# Patient Record
Sex: MALE | Race: WHITE | NOT HISPANIC OR LATINO | Employment: OTHER | URBAN - METROPOLITAN AREA
[De-identification: names, ages, dates, MRNs, and addresses within clinical notes are randomized per-mention and may not be internally consistent; named-entity substitution may affect disease eponyms.]

---

## 2017-02-13 ENCOUNTER — OFFICE VISIT - HEALTHEAST (OUTPATIENT)
Dept: ENDOCRINOLOGY | Facility: CLINIC | Age: 72
End: 2017-02-13

## 2017-02-13 ENCOUNTER — COMMUNICATION - HEALTHEAST (OUTPATIENT)
Dept: TELEHEALTH | Facility: CLINIC | Age: 72
End: 2017-02-13

## 2017-02-13 DIAGNOSIS — E29.1 HYPOGONADISM IN MALE: ICD-10-CM

## 2017-02-13 DIAGNOSIS — E66.9 OBESITY: ICD-10-CM

## 2017-02-13 DIAGNOSIS — E29.1 TESTICULAR HYPOFUNCTION: ICD-10-CM

## 2017-02-13 DIAGNOSIS — I10 ESSENTIAL HYPERTENSION WITH GOAL BLOOD PRESSURE LESS THAN 140/90: ICD-10-CM

## 2017-02-13 DIAGNOSIS — F52.8 PSYCHOSEXUAL DYSFUNCTION WITH INHIBITED SEXUAL EXCITEMENT: ICD-10-CM

## 2017-02-13 LAB
ALT SERPL W P-5'-P-CCNC: 64 U/L (ref 0–45)
CHOLEST SERPL-MCNC: 171 MG/DL
CREAT SERPL-MCNC: 1.27 MG/DL (ref 0.7–1.3)
FASTING STATUS PATIENT QL REPORTED: ABNORMAL
GFR SERPL CREATININE-BSD FRML MDRD: 56 ML/MIN/1.73M2
HDLC SERPL-MCNC: 28 MG/DL
LDLC SERPL CALC-MCNC: 70 MG/DL
TRIGL SERPL-MCNC: 363 MG/DL

## 2017-02-15 ENCOUNTER — COMMUNICATION - HEALTHEAST (OUTPATIENT)
Dept: ENDOCRINOLOGY | Facility: CLINIC | Age: 72
End: 2017-02-15

## 2017-02-15 DIAGNOSIS — E78.1 HYPERTRIGLYCERIDEMIA: ICD-10-CM

## 2017-02-16 ENCOUNTER — COMMUNICATION - HEALTHEAST (OUTPATIENT)
Dept: ENDOCRINOLOGY | Facility: CLINIC | Age: 72
End: 2017-02-16

## 2017-02-27 ENCOUNTER — COMMUNICATION - HEALTHEAST (OUTPATIENT)
Dept: ENDOCRINOLOGY | Facility: CLINIC | Age: 72
End: 2017-02-27

## 2017-02-27 DIAGNOSIS — E78.00 HYPERCHOLESTEROLEMIA: ICD-10-CM

## 2017-03-14 ENCOUNTER — OFFICE VISIT - HEALTHEAST (OUTPATIENT)
Dept: FAMILY MEDICINE | Facility: CLINIC | Age: 72
End: 2017-03-14

## 2017-03-14 ENCOUNTER — RECORDS - HEALTHEAST (OUTPATIENT)
Dept: GENERAL RADIOLOGY | Facility: CLINIC | Age: 72
End: 2017-03-14

## 2017-03-14 DIAGNOSIS — Z00.01 ENCOUNTER FOR GENERAL ADULT MEDICAL EXAMINATION WITH ABNORMAL FINDINGS: ICD-10-CM

## 2017-03-14 DIAGNOSIS — R14.0 ABDOMINAL DISTENSION (GASEOUS): ICD-10-CM

## 2017-03-14 DIAGNOSIS — J45.20 MILD INTERMITTENT ASTHMA WITHOUT COMPLICATION: ICD-10-CM

## 2017-03-14 DIAGNOSIS — F52.8 PSYCHOSEXUAL DYSFUNCTION WITH INHIBITED SEXUAL EXCITEMENT: ICD-10-CM

## 2017-03-14 DIAGNOSIS — D75.1 ERYTHROCYTOSIS: ICD-10-CM

## 2017-03-14 DIAGNOSIS — G47.00 INSOMNIA: ICD-10-CM

## 2017-03-14 DIAGNOSIS — E29.1 TESTICULAR HYPOFUNCTION: ICD-10-CM

## 2017-03-14 DIAGNOSIS — Z12.11 COLON CANCER SCREENING: ICD-10-CM

## 2017-03-14 DIAGNOSIS — L21.9 SEBORRHEIC DERMATITIS: ICD-10-CM

## 2017-03-14 DIAGNOSIS — R14.0 ABDOMINAL DISTENSION: ICD-10-CM

## 2017-03-14 DIAGNOSIS — G47.33 OBSTRUCTIVE SLEEP APNEA (ADULT) (PEDIATRIC): ICD-10-CM

## 2017-03-14 DIAGNOSIS — I10 ESSENTIAL HYPERTENSION WITH GOAL BLOOD PRESSURE LESS THAN 140/90: ICD-10-CM

## 2017-03-14 ASSESSMENT — MIFFLIN-ST. JEOR: SCORE: 1651.19

## 2017-04-26 ENCOUNTER — COMMUNICATION - HEALTHEAST (OUTPATIENT)
Dept: FAMILY MEDICINE | Facility: CLINIC | Age: 72
End: 2017-04-26

## 2017-04-26 DIAGNOSIS — F43.10 POSTTRAUMATIC STRESS DISORDER: ICD-10-CM

## 2017-04-29 ENCOUNTER — COMMUNICATION - HEALTHEAST (OUTPATIENT)
Dept: FAMILY MEDICINE | Facility: CLINIC | Age: 72
End: 2017-04-29

## 2017-04-29 DIAGNOSIS — I10 HTN (HYPERTENSION): ICD-10-CM

## 2017-05-12 ENCOUNTER — RECORDS - HEALTHEAST (OUTPATIENT)
Dept: ADMINISTRATIVE | Facility: OTHER | Age: 72
End: 2017-05-12

## 2017-06-26 ENCOUNTER — OFFICE VISIT - HEALTHEAST (OUTPATIENT)
Dept: FAMILY MEDICINE | Facility: CLINIC | Age: 72
End: 2017-06-26

## 2017-06-26 ENCOUNTER — AMBULATORY - HEALTHEAST (OUTPATIENT)
Dept: FAMILY MEDICINE | Facility: CLINIC | Age: 72
End: 2017-06-26

## 2017-06-26 DIAGNOSIS — E78.1 HYPERTRIGLYCERIDEMIA: ICD-10-CM

## 2017-06-26 DIAGNOSIS — G47.30 SLEEP APNEA: ICD-10-CM

## 2017-06-26 DIAGNOSIS — R63.5 WEIGHT GAIN: ICD-10-CM

## 2017-06-26 DIAGNOSIS — F43.10 PTSD (POST-TRAUMATIC STRESS DISORDER): ICD-10-CM

## 2017-06-26 DIAGNOSIS — E78.6 LOW HDL (UNDER 40): ICD-10-CM

## 2017-08-07 ENCOUNTER — COMMUNICATION - HEALTHEAST (OUTPATIENT)
Dept: ENDOCRINOLOGY | Facility: CLINIC | Age: 72
End: 2017-08-07

## 2017-08-07 DIAGNOSIS — E29.1 HYPOGONADISM IN MALE: ICD-10-CM

## 2017-08-17 ENCOUNTER — COMMUNICATION - HEALTHEAST (OUTPATIENT)
Dept: ENDOCRINOLOGY | Facility: CLINIC | Age: 72
End: 2017-08-17

## 2017-08-17 DIAGNOSIS — E29.1 HYPOGONADISM IN MALE: ICD-10-CM

## 2017-10-16 ENCOUNTER — RECORDS - HEALTHEAST (OUTPATIENT)
Dept: ADMINISTRATIVE | Facility: OTHER | Age: 72
End: 2017-10-16

## 2017-10-26 ENCOUNTER — RECORDS - HEALTHEAST (OUTPATIENT)
Dept: ADMINISTRATIVE | Facility: OTHER | Age: 72
End: 2017-10-26

## 2017-10-30 ENCOUNTER — RECORDS - HEALTHEAST (OUTPATIENT)
Dept: ADMINISTRATIVE | Facility: OTHER | Age: 72
End: 2017-10-30

## 2017-11-01 ENCOUNTER — COMMUNICATION - HEALTHEAST (OUTPATIENT)
Dept: ONCOLOGY | Facility: HOSPITAL | Age: 72
End: 2017-11-01

## 2017-11-02 ENCOUNTER — RECORDS - HEALTHEAST (OUTPATIENT)
Dept: ADMINISTRATIVE | Facility: OTHER | Age: 72
End: 2017-11-02

## 2017-11-15 ENCOUNTER — COMMUNICATION - HEALTHEAST (OUTPATIENT)
Dept: ONCOLOGY | Facility: HOSPITAL | Age: 72
End: 2017-11-15

## 2017-11-17 ENCOUNTER — RECORDS - HEALTHEAST (OUTPATIENT)
Dept: ADMINISTRATIVE | Facility: OTHER | Age: 72
End: 2017-11-17

## 2017-12-14 ENCOUNTER — RECORDS - HEALTHEAST (OUTPATIENT)
Dept: ADMINISTRATIVE | Facility: OTHER | Age: 72
End: 2017-12-14

## 2018-01-30 ENCOUNTER — COMMUNICATION - HEALTHEAST (OUTPATIENT)
Dept: ENDOCRINOLOGY | Facility: CLINIC | Age: 73
End: 2018-01-30

## 2018-01-30 DIAGNOSIS — F52.8 PSYCHOSEXUAL DYSFUNCTION WITH INHIBITED SEXUAL EXCITEMENT: ICD-10-CM

## 2018-01-30 DIAGNOSIS — N40.0 BPH (BENIGN PROSTATIC HYPERPLASIA): ICD-10-CM

## 2018-02-07 ENCOUNTER — AMBULATORY - HEALTHEAST (OUTPATIENT)
Dept: LAB | Facility: CLINIC | Age: 73
End: 2018-02-07

## 2018-02-07 DIAGNOSIS — F52.8 PSYCHOSEXUAL DYSFUNCTION WITH INHIBITED SEXUAL EXCITEMENT: ICD-10-CM

## 2018-02-07 DIAGNOSIS — N40.0 BPH (BENIGN PROSTATIC HYPERPLASIA): ICD-10-CM

## 2018-02-07 LAB
CREAT SERPL-MCNC: 1.34 MG/DL (ref 0.7–1.3)
GFR SERPL CREATININE-BSD FRML MDRD: 53 ML/MIN/1.73M2
POTASSIUM BLD-SCNC: 4.2 MMOL/L (ref 3.5–5)

## 2018-02-09 ENCOUNTER — OFFICE VISIT - HEALTHEAST (OUTPATIENT)
Dept: FAMILY MEDICINE | Facility: CLINIC | Age: 73
End: 2018-02-09

## 2018-02-09 DIAGNOSIS — R63.5 WEIGHT GAIN: ICD-10-CM

## 2018-02-09 DIAGNOSIS — G47.33 OBSTRUCTIVE SLEEP APNEA: ICD-10-CM

## 2018-02-09 DIAGNOSIS — E29.1 HYPOGONADISM IN MALE: ICD-10-CM

## 2018-02-09 LAB
PSA FREE MFR SERPL: 33 %
PSA FREE SERPL-MCNC: 0.2 NG/ML
PSA SERPL IA-MCNC: 0.6 NG/ML (ref 0–4)
TESTOST SERPL-MCNC: 417 NG/DL (ref 221–716)

## 2018-02-12 ENCOUNTER — OFFICE VISIT - HEALTHEAST (OUTPATIENT)
Dept: ENDOCRINOLOGY | Facility: CLINIC | Age: 73
End: 2018-02-12

## 2018-02-12 DIAGNOSIS — E29.0 TESTICULAR HYPERFUNCTION: ICD-10-CM

## 2018-02-12 ASSESSMENT — MIFFLIN-ST. JEOR: SCORE: 1682.94

## 2018-03-30 ENCOUNTER — COMMUNICATION - HEALTHEAST (OUTPATIENT)
Dept: ENDOCRINOLOGY | Facility: CLINIC | Age: 73
End: 2018-03-30

## 2018-03-30 DIAGNOSIS — E29.1 HYPOGONADISM IN MALE: ICD-10-CM

## 2018-04-27 ENCOUNTER — OFFICE VISIT - HEALTHEAST (OUTPATIENT)
Dept: FAMILY MEDICINE | Facility: CLINIC | Age: 73
End: 2018-04-27

## 2018-04-27 ENCOUNTER — AMBULATORY - HEALTHEAST (OUTPATIENT)
Dept: FAMILY MEDICINE | Facility: CLINIC | Age: 73
End: 2018-04-27

## 2018-04-27 DIAGNOSIS — G47.33 OBSTRUCTIVE SLEEP APNEA: ICD-10-CM

## 2018-04-27 DIAGNOSIS — D75.1 ERYTHROCYTOSIS: ICD-10-CM

## 2018-04-27 DIAGNOSIS — R63.5 WEIGHT GAIN: ICD-10-CM

## 2018-04-27 DIAGNOSIS — I10 HTN (HYPERTENSION): ICD-10-CM

## 2018-04-27 DIAGNOSIS — E78.00 HYPERCHOLESTEROLEMIA: ICD-10-CM

## 2018-04-27 DIAGNOSIS — R53.83 FATIGUE: ICD-10-CM

## 2018-04-27 DIAGNOSIS — Z12.11 SCREEN FOR COLON CANCER: ICD-10-CM

## 2018-04-27 DIAGNOSIS — R14.0 BLOATING: ICD-10-CM

## 2018-04-27 LAB
ALBUMIN SERPL-MCNC: 3.7 G/DL (ref 3.5–5)
ALP SERPL-CCNC: 117 U/L (ref 45–120)
ALT SERPL W P-5'-P-CCNC: 62 U/L (ref 0–45)
ANION GAP SERPL CALCULATED.3IONS-SCNC: 14 MMOL/L (ref 5–18)
AST SERPL W P-5'-P-CCNC: 27 U/L (ref 0–40)
BASOPHILS # BLD AUTO: 0.1 THOU/UL (ref 0–0.2)
BASOPHILS NFR BLD AUTO: 2 % (ref 0–2)
BILIRUB SERPL-MCNC: 1.2 MG/DL (ref 0–1)
BUN SERPL-MCNC: 23 MG/DL (ref 8–28)
CALCIUM SERPL-MCNC: 9.5 MG/DL (ref 8.5–10.5)
CHLORIDE BLD-SCNC: 106 MMOL/L (ref 98–107)
CO2 SERPL-SCNC: 20 MMOL/L (ref 22–31)
CREAT SERPL-MCNC: 1.06 MG/DL (ref 0.7–1.3)
EOSINOPHIL # BLD AUTO: 0.1 THOU/UL (ref 0–0.4)
EOSINOPHIL NFR BLD AUTO: 3 % (ref 0–6)
ERYTHROCYTE [DISTWIDTH] IN BLOOD BY AUTOMATED COUNT: 11.7 % (ref 11–14.5)
GFR SERPL CREATININE-BSD FRML MDRD: >60 ML/MIN/1.73M2
GLUCOSE BLD-MCNC: 109 MG/DL (ref 70–125)
HBA1C MFR BLD: 8.6 % (ref 3.5–6)
HCT VFR BLD AUTO: 58.4 % (ref 40–54)
HGB BLD-MCNC: 19.8 G/DL (ref 14–18)
LYMPHOCYTES # BLD AUTO: 1.7 THOU/UL (ref 0.8–4.4)
LYMPHOCYTES NFR BLD AUTO: 34 % (ref 20–40)
MCH RBC QN AUTO: 34.1 PG (ref 27–34)
MCHC RBC AUTO-ENTMCNC: 33.9 G/DL (ref 32–36)
MCV RBC AUTO: 101 FL (ref 80–100)
MONOCYTES # BLD AUTO: 0.8 THOU/UL (ref 0–0.9)
MONOCYTES NFR BLD AUTO: 15 % (ref 2–10)
NEUTROPHILS # BLD AUTO: 2.4 THOU/UL (ref 2–7.7)
NEUTROPHILS NFR BLD AUTO: 47 % (ref 50–70)
PLATELET # BLD AUTO: 159 THOU/UL (ref 140–440)
PMV BLD AUTO: 8.5 FL (ref 7–10)
POTASSIUM BLD-SCNC: 4.5 MMOL/L (ref 3.5–5)
PROT SERPL-MCNC: 7.2 G/DL (ref 6–8)
RBC # BLD AUTO: 5.8 MILL/UL (ref 4.4–6.2)
SODIUM SERPL-SCNC: 140 MMOL/L (ref 136–145)
TSH SERPL DL<=0.005 MIU/L-ACNC: 1.62 UIU/ML (ref 0.3–5)
VIT B12 SERPL-MCNC: 1441 PG/ML (ref 213–816)
WBC: 5.1 THOU/UL (ref 4–11)

## 2018-04-30 LAB
25(OH)D3 SERPL-MCNC: 20.3 NG/ML (ref 30–80)
CHOLEST SERPL-MCNC: 195 MG/DL
GLIADIN IGA SER-ACNC: 2.2 U/ML
GLIADIN IGG SER-ACNC: 1.2 U/ML
HDLC SERPL-MCNC: 25 MG/DL
IGA SERPL-MCNC: 232 MG/DL (ref 65–400)
LDLC SERPL CALC-MCNC: 100 MG/DL
LDLC SERPL CALC-MCNC: ABNORMAL MG/DL
TRIGL SERPL-MCNC: 593 MG/DL
TTG IGA SER-ACNC: 0.5 U/ML
TTG IGG SER-ACNC: <0.6 U/ML

## 2018-05-01 ENCOUNTER — OFFICE VISIT - HEALTHEAST (OUTPATIENT)
Dept: PODIATRY | Facility: CLINIC | Age: 73
End: 2018-05-01

## 2018-05-01 ENCOUNTER — RECORDS - HEALTHEAST (OUTPATIENT)
Dept: ADMINISTRATIVE | Facility: OTHER | Age: 73
End: 2018-05-01

## 2018-05-01 DIAGNOSIS — M79.672 LEFT FOOT PAIN: ICD-10-CM

## 2018-05-06 ENCOUNTER — COMMUNICATION - HEALTHEAST (OUTPATIENT)
Dept: FAMILY MEDICINE | Facility: CLINIC | Age: 73
End: 2018-05-06

## 2018-05-07 ENCOUNTER — COMMUNICATION - HEALTHEAST (OUTPATIENT)
Dept: ADMINISTRATIVE | Facility: CLINIC | Age: 73
End: 2018-05-07

## 2018-05-15 ENCOUNTER — COMMUNICATION - HEALTHEAST (OUTPATIENT)
Dept: FAMILY MEDICINE | Facility: CLINIC | Age: 73
End: 2018-05-15

## 2018-05-15 DIAGNOSIS — I10 HTN (HYPERTENSION): ICD-10-CM

## 2018-05-21 ENCOUNTER — RECORDS - HEALTHEAST (OUTPATIENT)
Dept: ADMINISTRATIVE | Facility: OTHER | Age: 73
End: 2018-05-21

## 2018-05-22 ENCOUNTER — OFFICE VISIT - HEALTHEAST (OUTPATIENT)
Dept: FAMILY MEDICINE | Facility: CLINIC | Age: 73
End: 2018-05-22

## 2018-05-22 DIAGNOSIS — Z01.818 PREOPERATIVE EXAMINATION: ICD-10-CM

## 2018-05-22 DIAGNOSIS — I10 HTN (HYPERTENSION): ICD-10-CM

## 2018-05-22 DIAGNOSIS — E29.1 HYPOGONADISM IN MALE: ICD-10-CM

## 2018-05-22 DIAGNOSIS — E11.9 TYPE 2 DIABETES MELLITUS (H): ICD-10-CM

## 2018-05-22 DIAGNOSIS — M20.5X9 HALLUX LIMITUS: ICD-10-CM

## 2018-05-22 DIAGNOSIS — J45.20 MILD INTERMITTENT ASTHMA WITHOUT COMPLICATION: ICD-10-CM

## 2018-05-22 DIAGNOSIS — D75.1 POLYCYTHEMIA, SECONDARY: ICD-10-CM

## 2018-05-22 DIAGNOSIS — G47.33 OBSTRUCTIVE SLEEP APNEA: ICD-10-CM

## 2018-05-22 LAB
ANION GAP SERPL CALCULATED.3IONS-SCNC: 12 MMOL/L (ref 5–18)
ATRIAL RATE - MUSE: 90 BPM
BUN SERPL-MCNC: 20 MG/DL (ref 8–28)
CALCIUM SERPL-MCNC: 9.4 MG/DL (ref 8.5–10.5)
CHLORIDE BLD-SCNC: 103 MMOL/L (ref 98–107)
CO2 SERPL-SCNC: 25 MMOL/L (ref 22–31)
CREAT SERPL-MCNC: 1.22 MG/DL (ref 0.7–1.3)
DIASTOLIC BLOOD PRESSURE - MUSE: NORMAL MMHG
GFR SERPL CREATININE-BSD FRML MDRD: 59 ML/MIN/1.73M2
GLUCOSE BLD-MCNC: 343 MG/DL (ref 70–125)
HGB BLD-MCNC: 18.7 G/DL (ref 14–18)
INTERPRETATION ECG - MUSE: NORMAL
P AXIS - MUSE: 57 DEGREES
POTASSIUM BLD-SCNC: 4.1 MMOL/L (ref 3.5–5)
PR INTERVAL - MUSE: 162 MS
QRS DURATION - MUSE: 102 MS
QT - MUSE: 366 MS
QTC - MUSE: 447 MS
R AXIS - MUSE: -20 DEGREES
SODIUM SERPL-SCNC: 140 MMOL/L (ref 136–145)
SYSTOLIC BLOOD PRESSURE - MUSE: NORMAL MMHG
T AXIS - MUSE: 34 DEGREES
VENTRICULAR RATE- MUSE: 90 BPM

## 2018-05-22 ASSESSMENT — MIFFLIN-ST. JEOR: SCORE: 1673.87

## 2018-05-24 ENCOUNTER — COMMUNICATION - HEALTHEAST (OUTPATIENT)
Dept: FAMILY MEDICINE | Facility: CLINIC | Age: 73
End: 2018-05-24

## 2018-05-31 ENCOUNTER — COMMUNICATION - HEALTHEAST (OUTPATIENT)
Dept: ADMINISTRATIVE | Facility: CLINIC | Age: 73
End: 2018-05-31

## 2018-06-19 ENCOUNTER — AMBULATORY - HEALTHEAST (OUTPATIENT)
Dept: EDUCATION SERVICES | Facility: CLINIC | Age: 73
End: 2018-06-19

## 2018-06-19 DIAGNOSIS — E11.65 UNCONTROLLED TYPE 2 DIABETES MELLITUS WITH HYPERGLYCEMIA, WITHOUT LONG-TERM CURRENT USE OF INSULIN (H): ICD-10-CM

## 2018-06-27 ENCOUNTER — COMMUNICATION - HEALTHEAST (OUTPATIENT)
Dept: LAB | Facility: CLINIC | Age: 73
End: 2018-06-27

## 2018-06-28 ENCOUNTER — AMBULATORY - HEALTHEAST (OUTPATIENT)
Dept: ENDOCRINOLOGY | Facility: CLINIC | Age: 73
End: 2018-06-28

## 2018-06-28 DIAGNOSIS — N40.0 BPH (BENIGN PROSTATIC HYPERPLASIA): ICD-10-CM

## 2018-06-28 DIAGNOSIS — E29.1 HYPOGONADISM, MALE: ICD-10-CM

## 2018-06-28 DIAGNOSIS — F52.8 PSYCHOSEXUAL DYSFUNCTION WITH INHIBITED SEXUAL EXCITEMENT: ICD-10-CM

## 2018-06-28 DIAGNOSIS — E29.0 TESTICULAR HYPERFUNCTION: ICD-10-CM

## 2018-07-13 ENCOUNTER — AMBULATORY - HEALTHEAST (OUTPATIENT)
Dept: LAB | Facility: CLINIC | Age: 73
End: 2018-07-13

## 2018-07-13 DIAGNOSIS — E29.1 HYPOGONADISM, MALE: ICD-10-CM

## 2018-07-13 DIAGNOSIS — F52.8 PSYCHOSEXUAL DYSFUNCTION WITH INHIBITED SEXUAL EXCITEMENT: ICD-10-CM

## 2018-07-13 DIAGNOSIS — N40.0 BPH (BENIGN PROSTATIC HYPERPLASIA): ICD-10-CM

## 2018-07-13 DIAGNOSIS — E29.0 TESTICULAR HYPERFUNCTION: ICD-10-CM

## 2018-07-13 LAB
CREAT SERPL-MCNC: 1.25 MG/DL (ref 0.7–1.3)
GFR SERPL CREATININE-BSD FRML MDRD: 57 ML/MIN/1.73M2
POTASSIUM BLD-SCNC: 4.2 MMOL/L (ref 3.5–5)

## 2018-07-15 LAB
PSA FREE MFR SERPL: 17 %
PSA FREE SERPL-MCNC: 0.1 NG/ML
PSA SERPL IA-MCNC: 0.6 NG/ML (ref 0–4)

## 2018-07-16 ENCOUNTER — OFFICE VISIT - HEALTHEAST (OUTPATIENT)
Dept: ENDOCRINOLOGY | Facility: CLINIC | Age: 73
End: 2018-07-16

## 2018-07-16 DIAGNOSIS — E29.1 TESTICULAR HYPOFUNCTION: ICD-10-CM

## 2018-07-16 LAB — TESTOST SERPL-MCNC: 140 NG/DL (ref 221–716)

## 2018-07-16 ASSESSMENT — MIFFLIN-ST. JEOR: SCORE: 1628.51

## 2018-07-20 ENCOUNTER — COMMUNICATION - HEALTHEAST (OUTPATIENT)
Dept: ENDOCRINOLOGY | Facility: CLINIC | Age: 73
End: 2018-07-20

## 2018-07-31 ENCOUNTER — COMMUNICATION - HEALTHEAST (OUTPATIENT)
Dept: ENDOCRINOLOGY | Facility: CLINIC | Age: 73
End: 2018-07-31

## 2018-07-31 DIAGNOSIS — E78.00 HYPERCHOLESTEROLEMIA: ICD-10-CM

## 2018-08-03 ENCOUNTER — RECORDS - HEALTHEAST (OUTPATIENT)
Dept: ADMINISTRATIVE | Facility: OTHER | Age: 73
End: 2018-08-03

## 2018-08-07 ENCOUNTER — COMMUNICATION - HEALTHEAST (OUTPATIENT)
Dept: ENDOCRINOLOGY | Facility: CLINIC | Age: 73
End: 2018-08-07

## 2018-08-07 DIAGNOSIS — E29.1 TESTICULAR HYPOFUNCTION: ICD-10-CM

## 2018-08-07 DIAGNOSIS — E78.00 HYPERCHOLESTEROLEMIA: ICD-10-CM

## 2018-10-04 ENCOUNTER — COMMUNICATION - HEALTHEAST (OUTPATIENT)
Dept: ENDOCRINOLOGY | Facility: CLINIC | Age: 73
End: 2018-10-04

## 2018-10-04 DIAGNOSIS — E78.00 HYPERCHOLESTEROLEMIA: ICD-10-CM

## 2018-10-11 ENCOUNTER — COMMUNICATION - HEALTHEAST (OUTPATIENT)
Dept: PODIATRY | Facility: CLINIC | Age: 73
End: 2018-10-11

## 2018-10-11 ENCOUNTER — COMMUNICATION - HEALTHEAST (OUTPATIENT)
Dept: VASCULAR SURGERY | Facility: CLINIC | Age: 73
End: 2018-10-11

## 2018-10-11 ENCOUNTER — COMMUNICATION - HEALTHEAST (OUTPATIENT)
Dept: ADMINISTRATIVE | Facility: CLINIC | Age: 73
End: 2018-10-11

## 2018-10-13 ENCOUNTER — COMMUNICATION - HEALTHEAST (OUTPATIENT)
Dept: ENDOCRINOLOGY | Facility: CLINIC | Age: 73
End: 2018-10-13

## 2018-10-13 DIAGNOSIS — E29.1 TESTICULAR HYPOFUNCTION: ICD-10-CM

## 2018-12-27 ENCOUNTER — AMBULATORY - HEALTHEAST (OUTPATIENT)
Dept: ENDOCRINOLOGY | Facility: CLINIC | Age: 73
End: 2018-12-27

## 2018-12-27 ENCOUNTER — COMMUNICATION - HEALTHEAST (OUTPATIENT)
Dept: ENDOCRINOLOGY | Facility: CLINIC | Age: 73
End: 2018-12-27

## 2018-12-27 DIAGNOSIS — E29.1 HYPOGONADISM, MALE: ICD-10-CM

## 2018-12-27 DIAGNOSIS — N40.0 BPH (BENIGN PROSTATIC HYPERPLASIA): ICD-10-CM

## 2018-12-27 DIAGNOSIS — R63.5 ABNORMAL WEIGHT GAIN: ICD-10-CM

## 2018-12-27 DIAGNOSIS — R63.5 WEIGHT GAIN: ICD-10-CM

## 2018-12-27 DIAGNOSIS — E66.9 OBESITY: ICD-10-CM

## 2018-12-27 DIAGNOSIS — E29.1 TESTICULAR HYPOFUNCTION: ICD-10-CM

## 2018-12-27 DIAGNOSIS — M54.2 CERVICALGIA: ICD-10-CM

## 2018-12-27 DIAGNOSIS — E11.9 DIABETES MELLITUS, TYPE 2 (H): ICD-10-CM

## 2019-01-03 ENCOUNTER — COMMUNICATION - HEALTHEAST (OUTPATIENT)
Dept: ENDOCRINOLOGY | Facility: CLINIC | Age: 74
End: 2019-01-03

## 2019-01-03 DIAGNOSIS — E29.1 TESTICULAR HYPOFUNCTION: ICD-10-CM

## 2019-01-04 ENCOUNTER — COMMUNICATION - HEALTHEAST (OUTPATIENT)
Dept: ENDOCRINOLOGY | Facility: CLINIC | Age: 74
End: 2019-01-04

## 2019-01-04 DIAGNOSIS — E29.1 TESTICULAR HYPOFUNCTION: ICD-10-CM

## 2019-01-10 ENCOUNTER — AMBULATORY - HEALTHEAST (OUTPATIENT)
Dept: LAB | Facility: CLINIC | Age: 74
End: 2019-01-10

## 2019-01-10 DIAGNOSIS — N40.0 BPH (BENIGN PROSTATIC HYPERPLASIA): ICD-10-CM

## 2019-01-10 DIAGNOSIS — E11.9 DIABETES MELLITUS, TYPE 2 (H): ICD-10-CM

## 2019-01-10 DIAGNOSIS — E29.1 HYPOGONADISM, MALE: ICD-10-CM

## 2019-01-10 DIAGNOSIS — E29.1 TESTICULAR HYPOFUNCTION: ICD-10-CM

## 2019-01-10 LAB
CREAT SERPL-MCNC: 1.63 MG/DL (ref 0.7–1.3)
GFR SERPL CREATININE-BSD FRML MDRD: 42 ML/MIN/1.73M2
HBA1C MFR BLD: 6.1 % (ref 3.5–6)
POTASSIUM BLD-SCNC: 5 MMOL/L (ref 3.5–5)

## 2019-01-11 LAB — TESTOST SERPL-MCNC: 149 NG/DL (ref 221–716)

## 2019-01-12 LAB
PSA FREE MFR SERPL: 17 %
PSA FREE SERPL-MCNC: 0.1 NG/ML
PSA SERPL IA-MCNC: 0.6 NG/ML (ref 0–4)

## 2019-01-18 ENCOUNTER — COMMUNICATION - HEALTHEAST (OUTPATIENT)
Dept: ADMINISTRATIVE | Facility: CLINIC | Age: 74
End: 2019-01-18

## 2019-02-04 ENCOUNTER — COMMUNICATION - HEALTHEAST (OUTPATIENT)
Dept: ENDOCRINOLOGY | Facility: CLINIC | Age: 74
End: 2019-02-04

## 2019-02-04 DIAGNOSIS — E78.00 HYPERCHOLESTEROLEMIA: ICD-10-CM

## 2019-02-06 ENCOUNTER — COMMUNICATION - HEALTHEAST (OUTPATIENT)
Dept: FAMILY MEDICINE | Facility: CLINIC | Age: 74
End: 2019-02-06

## 2019-02-06 DIAGNOSIS — I10 HTN (HYPERTENSION): ICD-10-CM

## 2019-02-10 ENCOUNTER — COMMUNICATION - HEALTHEAST (OUTPATIENT)
Dept: ENDOCRINOLOGY | Facility: CLINIC | Age: 74
End: 2019-02-10

## 2019-02-10 DIAGNOSIS — E29.1 TESTICULAR HYPOFUNCTION: ICD-10-CM

## 2019-04-10 ENCOUNTER — COMMUNICATION - HEALTHEAST (OUTPATIENT)
Dept: ENDOCRINOLOGY | Facility: CLINIC | Age: 74
End: 2019-04-10

## 2019-04-10 DIAGNOSIS — E29.1 TESTICULAR HYPOFUNCTION: ICD-10-CM

## 2019-05-01 ENCOUNTER — COMMUNICATION - HEALTHEAST (OUTPATIENT)
Dept: ENDOCRINOLOGY | Facility: CLINIC | Age: 74
End: 2019-05-01

## 2019-05-01 DIAGNOSIS — E78.00 HYPERCHOLESTEROLEMIA: ICD-10-CM

## 2019-06-10 ENCOUNTER — AMBULATORY - HEALTHEAST (OUTPATIENT)
Dept: FAMILY MEDICINE | Facility: CLINIC | Age: 74
End: 2019-06-10

## 2019-06-11 ENCOUNTER — OFFICE VISIT - HEALTHEAST (OUTPATIENT)
Dept: FAMILY MEDICINE | Facility: CLINIC | Age: 74
End: 2019-06-11

## 2019-06-11 DIAGNOSIS — M25.512 ACUTE PAIN OF LEFT SHOULDER: ICD-10-CM

## 2019-06-11 DIAGNOSIS — N52.9 ERECTILE DYSFUNCTION, UNSPECIFIED ERECTILE DYSFUNCTION TYPE: ICD-10-CM

## 2019-07-11 ENCOUNTER — RECORDS - HEALTHEAST (OUTPATIENT)
Dept: ADMINISTRATIVE | Facility: OTHER | Age: 74
End: 2019-07-11

## 2019-09-04 ENCOUNTER — COMMUNICATION - HEALTHEAST (OUTPATIENT)
Dept: FAMILY MEDICINE | Facility: CLINIC | Age: 74
End: 2019-09-04

## 2019-09-04 DIAGNOSIS — N52.9 ERECTILE DYSFUNCTION, UNSPECIFIED ERECTILE DYSFUNCTION TYPE: ICD-10-CM

## 2019-10-23 ENCOUNTER — AMBULATORY - HEALTHEAST (OUTPATIENT)
Dept: LAB | Facility: CLINIC | Age: 74
End: 2019-10-23

## 2019-10-23 ENCOUNTER — COMMUNICATION - HEALTHEAST (OUTPATIENT)
Dept: LAB | Facility: CLINIC | Age: 74
End: 2019-10-23

## 2019-10-23 DIAGNOSIS — R79.89 LOW TESTOSTERONE: ICD-10-CM

## 2019-10-23 DIAGNOSIS — E11.9 DIABETES MELLITUS (H): ICD-10-CM

## 2019-10-23 DIAGNOSIS — D75.1 POLYCYTHEMIA: ICD-10-CM

## 2019-10-23 LAB
ALT SERPL W P-5'-P-CCNC: 30 U/L (ref 0–45)
ANION GAP SERPL CALCULATED.3IONS-SCNC: 11 MMOL/L (ref 5–18)
BUN SERPL-MCNC: 24 MG/DL (ref 8–28)
CALCIUM SERPL-MCNC: 9.6 MG/DL (ref 8.5–10.5)
CHLORIDE BLD-SCNC: 105 MMOL/L (ref 98–107)
CHOLEST SERPL-MCNC: 170 MG/DL
CO2 SERPL-SCNC: 25 MMOL/L (ref 22–31)
CREAT SERPL-MCNC: 1.23 MG/DL (ref 0.7–1.3)
CREAT UR-MCNC: 99.3 MG/DL
ERYTHROCYTE [DISTWIDTH] IN BLOOD BY AUTOMATED COUNT: 13.8 % (ref 11–14.5)
FASTING STATUS PATIENT QL REPORTED: YES
GFR SERPL CREATININE-BSD FRML MDRD: 58 ML/MIN/1.73M2
GLUCOSE BLD-MCNC: 118 MG/DL (ref 70–125)
HBA1C MFR BLD: 6.7 % (ref 3.5–6)
HCT VFR BLD AUTO: 62 % (ref 40–54)
HDLC SERPL-MCNC: 30 MG/DL
HGB BLD-MCNC: 20.7 G/DL (ref 14–18)
LDLC SERPL CALC-MCNC: 85 MG/DL
MCH RBC QN AUTO: 32 PG (ref 27–34)
MCHC RBC AUTO-ENTMCNC: 33.4 G/DL (ref 32–36)
MCV RBC AUTO: 96 FL (ref 80–100)
MICROALBUMIN UR-MCNC: 3.55 MG/DL (ref 0–1.99)
MICROALBUMIN/CREAT UR: 35.8 MG/G
PLATELET # BLD AUTO: 165 THOU/UL (ref 140–440)
PMV BLD AUTO: 11.9 FL (ref 8.5–12.5)
POTASSIUM BLD-SCNC: 4.9 MMOL/L (ref 3.5–5)
RBC # BLD AUTO: 6.47 MILL/UL (ref 4.4–6.2)
SODIUM SERPL-SCNC: 141 MMOL/L (ref 136–145)
TRIGL SERPL-MCNC: 275 MG/DL
WBC: 4 THOU/UL (ref 4–11)

## 2019-10-28 ENCOUNTER — COMMUNICATION - HEALTHEAST (OUTPATIENT)
Dept: ENDOCRINOLOGY | Facility: CLINIC | Age: 74
End: 2019-10-28

## 2019-10-28 ENCOUNTER — RECORDS - HEALTHEAST (OUTPATIENT)
Dept: ADMINISTRATIVE | Facility: OTHER | Age: 74
End: 2019-10-28

## 2019-10-28 DIAGNOSIS — E78.00 HYPERCHOLESTEROLEMIA: ICD-10-CM

## 2019-10-28 LAB
SHBG SERPL-SCNC: 25 NMOL/L (ref 11–80)
TESTOST FREE SERPL-MCNC: 4.31 NG/DL (ref 4.7–24.4)
TESTOST SERPL-MCNC: 195 NG/DL (ref 240–950)

## 2019-11-04 ENCOUNTER — RECORDS - HEALTHEAST (OUTPATIENT)
Dept: ADMINISTRATIVE | Facility: OTHER | Age: 74
End: 2019-11-04

## 2019-11-25 ENCOUNTER — COMMUNICATION - HEALTHEAST (OUTPATIENT)
Dept: ENDOCRINOLOGY | Facility: CLINIC | Age: 74
End: 2019-11-25

## 2019-11-25 DIAGNOSIS — E78.00 HYPERCHOLESTEROLEMIA: ICD-10-CM

## 2019-12-10 ENCOUNTER — AMBULATORY - HEALTHEAST (OUTPATIENT)
Dept: LAB | Facility: CLINIC | Age: 74
End: 2019-12-10

## 2019-12-11 ENCOUNTER — COMMUNICATION - HEALTHEAST (OUTPATIENT)
Dept: FAMILY MEDICINE | Facility: CLINIC | Age: 74
End: 2019-12-11

## 2019-12-11 ENCOUNTER — AMBULATORY - HEALTHEAST (OUTPATIENT)
Dept: LAB | Facility: CLINIC | Age: 74
End: 2019-12-11

## 2019-12-11 DIAGNOSIS — N52.9 ERECTILE DYSFUNCTION, UNSPECIFIED ERECTILE DYSFUNCTION TYPE: ICD-10-CM

## 2019-12-11 DIAGNOSIS — E29.1 HYPOGONADISM, MALE: ICD-10-CM

## 2020-07-05 ENCOUNTER — COMMUNICATION - HEALTHEAST (OUTPATIENT)
Dept: LAB | Facility: CLINIC | Age: 75
End: 2020-07-05

## 2020-07-05 DIAGNOSIS — N52.9 ERECTILE DYSFUNCTION, UNSPECIFIED ERECTILE DYSFUNCTION TYPE: ICD-10-CM

## 2020-08-06 ENCOUNTER — OFFICE VISIT - HEALTHEAST (OUTPATIENT)
Dept: FAMILY MEDICINE | Facility: CLINIC | Age: 75
End: 2020-08-06

## 2020-08-06 DIAGNOSIS — Z12.11 COLON CANCER SCREENING: ICD-10-CM

## 2020-08-06 DIAGNOSIS — R53.83 FATIGUE, UNSPECIFIED TYPE: ICD-10-CM

## 2020-08-06 DIAGNOSIS — G47.33 OBSTRUCTIVE SLEEP APNEA: ICD-10-CM

## 2020-08-06 DIAGNOSIS — I10 ESSENTIAL HYPERTENSION: ICD-10-CM

## 2020-08-06 DIAGNOSIS — E11.9 TYPE 2 DIABETES MELLITUS WITHOUT COMPLICATION, WITHOUT LONG-TERM CURRENT USE OF INSULIN (H): ICD-10-CM

## 2020-08-06 DIAGNOSIS — D75.1 POLYCYTHEMIA, SECONDARY: ICD-10-CM

## 2020-08-06 LAB
ALBUMIN SERPL-MCNC: 3.9 G/DL (ref 3.5–5)
ALP SERPL-CCNC: 91 U/L (ref 45–120)
ALT SERPL W P-5'-P-CCNC: 37 U/L (ref 0–45)
ANION GAP SERPL CALCULATED.3IONS-SCNC: 16 MMOL/L (ref 5–18)
AST SERPL W P-5'-P-CCNC: 25 U/L (ref 0–40)
BILIRUB SERPL-MCNC: 0.7 MG/DL (ref 0–1)
BUN SERPL-MCNC: 23 MG/DL (ref 8–28)
CALCIUM SERPL-MCNC: 9.1 MG/DL (ref 8.5–10.5)
CHLORIDE BLD-SCNC: 106 MMOL/L (ref 98–107)
CO2 SERPL-SCNC: 21 MMOL/L (ref 22–31)
CREAT SERPL-MCNC: 1.4 MG/DL (ref 0.7–1.3)
ERYTHROCYTE [DISTWIDTH] IN BLOOD BY AUTOMATED COUNT: 12.3 % (ref 11–14.5)
ERYTHROCYTE [SEDIMENTATION RATE] IN BLOOD BY WESTERGREN METHOD: 1 MM/HR (ref 0–15)
GFR SERPL CREATININE-BSD FRML MDRD: 50 ML/MIN/1.73M2
GLUCOSE BLD-MCNC: 225 MG/DL (ref 70–125)
HCT VFR BLD AUTO: 61.2 % (ref 40–54)
HGB BLD-MCNC: 20.2 G/DL (ref 14–18)
MCH RBC QN AUTO: 32.9 PG (ref 27–34)
MCHC RBC AUTO-ENTMCNC: 33.1 G/DL (ref 32–36)
MCV RBC AUTO: 99 FL (ref 80–100)
PLATELET # BLD AUTO: 145 THOU/UL (ref 140–440)
PMV BLD AUTO: 8.5 FL (ref 7–10)
POTASSIUM BLD-SCNC: 4.2 MMOL/L (ref 3.5–5)
PROT SERPL-MCNC: 6.8 G/DL (ref 6–8)
RBC # BLD AUTO: 6.15 MILL/UL (ref 4.4–6.2)
RHEUMATOID FACT SERPL-ACNC: <15 IU/ML (ref 0–30)
SODIUM SERPL-SCNC: 143 MMOL/L (ref 136–145)
TSH SERPL DL<=0.005 MIU/L-ACNC: 1.52 UIU/ML (ref 0.3–5)
VIT B12 SERPL-MCNC: 572 PG/ML (ref 213–816)
WBC: 4.6 THOU/UL (ref 4–11)

## 2020-08-07 ENCOUNTER — COMMUNICATION - HEALTHEAST (OUTPATIENT)
Dept: ADMINISTRATIVE | Facility: HOSPITAL | Age: 75
End: 2020-08-07

## 2020-08-07 LAB
ANA SER QL: 0.2 U
B BURGDOR IGG+IGM SER QL: 0.08 INDEX VALUE
HBA1C MFR BLD: 6.8 %

## 2020-08-09 ENCOUNTER — COMMUNICATION - HEALTHEAST (OUTPATIENT)
Dept: FAMILY MEDICINE | Facility: CLINIC | Age: 75
End: 2020-08-09

## 2020-08-13 ENCOUNTER — COMMUNICATION - HEALTHEAST (OUTPATIENT)
Dept: LAB | Facility: CLINIC | Age: 75
End: 2020-08-13

## 2020-08-13 ENCOUNTER — COMMUNICATION - HEALTHEAST (OUTPATIENT)
Dept: ADMINISTRATIVE | Facility: HOSPITAL | Age: 75
End: 2020-08-13

## 2020-08-13 DIAGNOSIS — N52.9 ERECTILE DYSFUNCTION, UNSPECIFIED ERECTILE DYSFUNCTION TYPE: ICD-10-CM

## 2020-08-26 ENCOUNTER — COMMUNICATION - HEALTHEAST (OUTPATIENT)
Dept: ADMINISTRATIVE | Facility: HOSPITAL | Age: 75
End: 2020-08-26

## 2020-09-30 ENCOUNTER — RECORDS - HEALTHEAST (OUTPATIENT)
Dept: ADMINISTRATIVE | Facility: OTHER | Age: 75
End: 2020-09-30

## 2020-10-02 ENCOUNTER — COMMUNICATION - HEALTHEAST (OUTPATIENT)
Dept: ADMINISTRATIVE | Facility: CLINIC | Age: 75
End: 2020-10-02

## 2020-10-30 ENCOUNTER — COMMUNICATION - HEALTHEAST (OUTPATIENT)
Dept: LAB | Facility: CLINIC | Age: 75
End: 2020-10-30

## 2020-10-30 DIAGNOSIS — N52.9 ERECTILE DYSFUNCTION, UNSPECIFIED ERECTILE DYSFUNCTION TYPE: ICD-10-CM

## 2020-12-03 ENCOUNTER — COMMUNICATION - HEALTHEAST (OUTPATIENT)
Dept: LAB | Facility: CLINIC | Age: 75
End: 2020-12-03

## 2020-12-03 DIAGNOSIS — N52.9 ERECTILE DYSFUNCTION, UNSPECIFIED ERECTILE DYSFUNCTION TYPE: ICD-10-CM

## 2021-01-16 ENCOUNTER — COMMUNICATION - HEALTHEAST (OUTPATIENT)
Dept: LAB | Facility: CLINIC | Age: 76
End: 2021-01-16

## 2021-01-16 DIAGNOSIS — N52.9 ERECTILE DYSFUNCTION, UNSPECIFIED ERECTILE DYSFUNCTION TYPE: ICD-10-CM

## 2021-03-03 ENCOUNTER — AMBULATORY - HEALTHEAST (OUTPATIENT)
Dept: NURSING | Facility: CLINIC | Age: 76
End: 2021-03-03

## 2021-03-13 ENCOUNTER — COMMUNICATION - HEALTHEAST (OUTPATIENT)
Dept: LAB | Facility: CLINIC | Age: 76
End: 2021-03-13

## 2021-03-13 DIAGNOSIS — N52.9 ERECTILE DYSFUNCTION, UNSPECIFIED ERECTILE DYSFUNCTION TYPE: ICD-10-CM

## 2021-03-24 ENCOUNTER — AMBULATORY - HEALTHEAST (OUTPATIENT)
Dept: NURSING | Facility: CLINIC | Age: 76
End: 2021-03-24

## 2021-04-16 ENCOUNTER — COMMUNICATION - HEALTHEAST (OUTPATIENT)
Dept: FAMILY MEDICINE | Facility: CLINIC | Age: 76
End: 2021-04-16

## 2021-04-16 ENCOUNTER — OFFICE VISIT - HEALTHEAST (OUTPATIENT)
Dept: FAMILY MEDICINE | Facility: CLINIC | Age: 76
End: 2021-04-16

## 2021-04-16 DIAGNOSIS — D75.1 POLYCYTHEMIA: ICD-10-CM

## 2021-04-16 DIAGNOSIS — M53.3 SI (SACROILIAC) JOINT DYSFUNCTION: ICD-10-CM

## 2021-04-16 DIAGNOSIS — E11.9 TYPE 2 DIABETES MELLITUS WITHOUT COMPLICATION, WITHOUT LONG-TERM CURRENT USE OF INSULIN (H): ICD-10-CM

## 2021-04-16 LAB
ANION GAP SERPL CALCULATED.3IONS-SCNC: 13 MMOL/L (ref 5–18)
BASOPHILS # BLD AUTO: 0 THOU/UL (ref 0–0.2)
BASOPHILS NFR BLD AUTO: 1 % (ref 0–2)
BUN SERPL-MCNC: 23 MG/DL (ref 8–28)
CALCIUM SERPL-MCNC: 9.2 MG/DL (ref 8.5–10.5)
CHLORIDE BLD-SCNC: 107 MMOL/L (ref 98–107)
CO2 SERPL-SCNC: 21 MMOL/L (ref 22–31)
CREAT SERPL-MCNC: 1.13 MG/DL (ref 0.7–1.3)
EOSINOPHIL # BLD AUTO: 0.1 THOU/UL (ref 0–0.4)
EOSINOPHIL NFR BLD AUTO: 3 % (ref 0–6)
ERYTHROCYTE [DISTWIDTH] IN BLOOD BY AUTOMATED COUNT: 16.7 % (ref 11–14.5)
GFR SERPL CREATININE-BSD FRML MDRD: >60 ML/MIN/1.73M2
GLUCOSE BLD-MCNC: 200 MG/DL (ref 70–125)
HBA1C MFR BLD: 7 %
HCT VFR BLD AUTO: 61.8 % (ref 40–54)
HGB BLD-MCNC: 20.9 G/DL (ref 14–18)
IMM GRANULOCYTES # BLD: 0 THOU/UL
IMM GRANULOCYTES NFR BLD: 1 %
LYMPHOCYTES # BLD AUTO: 1.3 THOU/UL (ref 0.8–4.4)
LYMPHOCYTES NFR BLD AUTO: 28 % (ref 20–40)
MCH RBC QN AUTO: 31.9 PG (ref 27–34)
MCHC RBC AUTO-ENTMCNC: 33.8 G/DL (ref 32–36)
MCV RBC AUTO: 94 FL (ref 80–100)
MONOCYTES # BLD AUTO: 0.7 THOU/UL (ref 0–0.9)
MONOCYTES NFR BLD AUTO: 16 % (ref 2–10)
NEUTROPHILS # BLD AUTO: 2.4 THOU/UL (ref 2–7.7)
NEUTROPHILS NFR BLD AUTO: 52 % (ref 50–70)
PLATELET # BLD AUTO: 152 THOU/UL (ref 140–440)
PMV BLD AUTO: 10.7 FL (ref 7–10)
POTASSIUM BLD-SCNC: 4.8 MMOL/L (ref 3.5–5)
RBC # BLD AUTO: 6.55 MILL/UL (ref 4.4–6.2)
SODIUM SERPL-SCNC: 141 MMOL/L (ref 136–145)
WBC: 4.6 THOU/UL (ref 4–11)

## 2021-04-20 ENCOUNTER — COMMUNICATION - HEALTHEAST (OUTPATIENT)
Dept: FAMILY MEDICINE | Facility: CLINIC | Age: 76
End: 2021-04-20

## 2021-04-20 DIAGNOSIS — M53.3 SI (SACROILIAC) JOINT DYSFUNCTION: ICD-10-CM

## 2021-04-23 ENCOUNTER — HOSPITAL ENCOUNTER (OUTPATIENT)
Dept: PHYSICAL MEDICINE AND REHAB | Facility: CLINIC | Age: 76
Discharge: HOME | End: 2021-04-23
Attending: FAMILY MEDICINE
Payer: COMMERCIAL

## 2021-04-23 DIAGNOSIS — M79.18 MYOFASCIAL PAIN: ICD-10-CM

## 2021-04-23 DIAGNOSIS — M53.3 SACROILIAC JOINT PAIN: ICD-10-CM

## 2021-04-23 ASSESSMENT — MIFFLIN-ST. JEOR: SCORE: 1642.11

## 2021-04-26 ENCOUNTER — HOSPITAL ENCOUNTER (OUTPATIENT)
Dept: RADIOLOGY | Facility: HOSPITAL | Age: 76
Discharge: HOME | End: 2021-04-26
Attending: PHYSICIAN ASSISTANT
Payer: COMMERCIAL

## 2021-04-26 ENCOUNTER — COMMUNICATION - HEALTHEAST (OUTPATIENT)
Dept: PHYSICAL MEDICINE AND REHAB | Facility: CLINIC | Age: 76
End: 2021-04-26

## 2021-04-26 DIAGNOSIS — M79.18 MYOFASCIAL PAIN: ICD-10-CM

## 2021-04-26 DIAGNOSIS — M53.3 SACROILIAC JOINT PAIN: ICD-10-CM

## 2021-04-27 ENCOUNTER — COMMUNICATION - HEALTHEAST (OUTPATIENT)
Dept: PHYSICAL MEDICINE AND REHAB | Facility: CLINIC | Age: 76
End: 2021-04-27

## 2021-04-28 ENCOUNTER — HOSPITAL ENCOUNTER (OUTPATIENT)
Dept: PHYSICAL MEDICINE AND REHAB | Facility: CLINIC | Age: 76
Discharge: HOME | End: 2021-04-28
Attending: PHYSICIAN ASSISTANT

## 2021-04-28 DIAGNOSIS — M54.16 LUMBAR RADICULITIS: ICD-10-CM

## 2021-04-28 DIAGNOSIS — M53.3 SACROILIAC JOINT PAIN: ICD-10-CM

## 2021-04-28 DIAGNOSIS — M79.18 MYOFASCIAL PAIN: ICD-10-CM

## 2021-05-13 ENCOUNTER — RECORDS - HEALTHEAST (OUTPATIENT)
Dept: ADMINISTRATIVE | Facility: OTHER | Age: 76
End: 2021-05-13

## 2021-05-14 ENCOUNTER — COMMUNICATION - HEALTHEAST (OUTPATIENT)
Dept: PHYSICAL MEDICINE AND REHAB | Facility: CLINIC | Age: 76
End: 2021-05-14

## 2021-05-17 ENCOUNTER — COMMUNICATION - HEALTHEAST (OUTPATIENT)
Dept: PHYSICAL MEDICINE AND REHAB | Facility: CLINIC | Age: 76
End: 2021-05-17

## 2021-05-17 DIAGNOSIS — M79.18 MYOFASCIAL PAIN: ICD-10-CM

## 2021-05-17 DIAGNOSIS — M54.16 LUMBAR RADICULITIS: ICD-10-CM

## 2021-05-21 ENCOUNTER — COMMUNICATION - HEALTHEAST (OUTPATIENT)
Dept: PHYSICAL MEDICINE AND REHAB | Facility: CLINIC | Age: 76
End: 2021-05-21

## 2021-05-21 DIAGNOSIS — M79.18 MYOFASCIAL PAIN: ICD-10-CM

## 2021-05-28 ENCOUNTER — RECORDS - HEALTHEAST (OUTPATIENT)
Dept: ADMINISTRATIVE | Facility: CLINIC | Age: 76
End: 2021-05-28

## 2021-05-30 VITALS — WEIGHT: 217 LBS | BODY MASS INDEX: 36.15 KG/M2 | HEIGHT: 65 IN

## 2021-05-30 VITALS — WEIGHT: 221 LBS | BODY MASS INDEX: 36.78 KG/M2

## 2021-05-31 VITALS — BODY MASS INDEX: 36.94 KG/M2 | WEIGHT: 222 LBS

## 2021-06-01 ENCOUNTER — RECORDS - HEALTHEAST (OUTPATIENT)
Dept: ADMINISTRATIVE | Facility: CLINIC | Age: 76
End: 2021-06-01

## 2021-06-01 VITALS — WEIGHT: 224 LBS | BODY MASS INDEX: 37.32 KG/M2 | HEIGHT: 65 IN

## 2021-06-01 VITALS — HEIGHT: 65 IN | WEIGHT: 212 LBS | BODY MASS INDEX: 35.32 KG/M2

## 2021-06-01 VITALS — HEIGHT: 65 IN | WEIGHT: 222 LBS | BODY MASS INDEX: 36.99 KG/M2

## 2021-06-01 VITALS — WEIGHT: 224.5 LBS | BODY MASS INDEX: 37.36 KG/M2

## 2021-06-01 VITALS — BODY MASS INDEX: 36.94 KG/M2 | WEIGHT: 222 LBS

## 2021-06-01 VITALS — WEIGHT: 226 LBS | BODY MASS INDEX: 37.61 KG/M2

## 2021-06-01 NOTE — TELEPHONE ENCOUNTER
Refill Approved    Rx renewed per Medication Renewal Policy. Medication was last renewed on 6/11/19.    Sherri Harry, [facility] Triage/Med Refill 9/4/2019     Requested Prescriptions   Pending Prescriptions Disp Refills     tadalafil (CIALIS) 20 MG tablet [Pharmacy Med Name: TADALAFIL 20 MG TABLET] 5 tablet 5     Sig: TAKE 1 TABLET BY MOUTH EVERY DAY AS NEEDED       Medications for Impotence Refill Protocol Passed - 9/4/2019  2:48 PM        Passed - PCP or prescribing provider visit in last year     Last office visit with prescriber/PCP: 6/11/2019 Kashmir Jackson MD OR same dept: 6/11/2019 Kashmir Jackson MD OR same specialty: 6/11/2019 Kashmir Jackson MD  Last physical: 5/22/2018 Last MTM visit: Visit date not found   Next visit within 3 mo: Visit date not found  Next physical within 3 mo: Visit date not found  Prescriber OR PCP: Kashmir Jackson MD  Last diagnosis associated with med order: 1. Erectile dysfunction, unspecified erectile dysfunction type  - tadalafil (CIALIS) 20 MG tablet [Pharmacy Med Name: TADALAFIL 20 MG TABLET]; TAKE 1 TABLET BY MOUTH EVERY DAY AS NEEDED  Dispense: 5 tablet; Refill: 5    If protocol passes may refill for 12 months if within 3 months of last provider visit (or a total of 15 months).

## 2021-06-02 NOTE — TELEPHONE ENCOUNTER
Pt came in for some lab ordered by his endo doctor. His hgb came back at 20.7 and Amanda was informed. She wanted me to send out a message to you as well to see if you would like anything else done for him. Results were faxed over to his endo Dr. Adonis Lopez at [facility].  Blood was sent over to [facility] [address] [facility] to verify.

## 2021-06-03 VITALS — WEIGHT: 215.9 LBS | BODY MASS INDEX: 35.93 KG/M2

## 2021-06-04 VITALS
DIASTOLIC BLOOD PRESSURE: 86 MMHG | HEART RATE: 86 BPM | BODY MASS INDEX: 37.29 KG/M2 | SYSTOLIC BLOOD PRESSURE: 138 MMHG | WEIGHT: 224.1 LBS | OXYGEN SATURATION: 97 %

## 2021-06-04 NOTE — TELEPHONE ENCOUNTER
RN cannot approve Refill Request    RN can NOT refill this medication PCP to review. Last office visit: 6/11/2019 Kashmir Jackson MD Last Physical: 5/22/2018 Last MTM visit: Visit date not found Last visit same specialty: 6/11/2019 Kashmir Jackson MD.  Next visit within 3 mo: Visit date not found  Next physical within 3 mo: Visit date not found      Aisha Ramos, [facility] Connection Triage/Med Refill 12/13/2019    Requested Prescriptions   Pending Prescriptions Disp Refills     tadalafil (CIALIS) 20 MG tablet [Pharmacy Med Name: TADALAFIL 20 MG TABLET] 8 tablet 7     Sig: TAKE 1 TABLET BY MOUTH EVERY DAY AS NEEDED       Medications for Impotence Refill Protocol Passed - 12/11/2019  2:19 PM        Passed - PCP or prescribing provider visit in last year     Last office visit with prescriber/PCP: 6/11/2019 Kashmir Jackson MD OR same dept: 6/11/2019 Kashmir Jackson MD OR same specialty: 6/11/2019 Kashmir Jackson MD  Last physical: 5/22/2018 Last MTM visit: Visit date not found   Next visit within 3 mo: Visit date not found  Next physical within 3 mo: Visit date not found  Prescriber OR PCP: Kashmir Jackson MD  Last diagnosis associated with med order: 1. Erectile dysfunction, unspecified erectile dysfunction type  - tadalafil (CIALIS) 20 MG tablet [Pharmacy Med Name: TADALAFIL 20 MG TABLET]; TAKE 1 TABLET BY MOUTH EVERY DAY AS NEEDED  Dispense: 8 tablet; Refill: 7    If protocol passes may refill for 12 months if within 3 months of last provider visit (or a total of 15 months).

## 2021-06-05 VITALS — BODY MASS INDEX: 35.82 KG/M2 | HEIGHT: 65 IN | WEIGHT: 215 LBS

## 2021-06-05 VITALS
DIASTOLIC BLOOD PRESSURE: 84 MMHG | SYSTOLIC BLOOD PRESSURE: 144 MMHG | HEART RATE: 77 BPM | OXYGEN SATURATION: 98 % | BODY MASS INDEX: 35.78 KG/M2 | WEIGHT: 215 LBS

## 2021-06-08 NOTE — PROGRESS NOTES
Progress Note    Reason for Visit:  Chief Complaint     Hypogonadism          Progress Note:    HPI:     This pleasant 71-year-old male patient is here for follow-up because of hypogonadism is currently on testosterone injection 50 mg IM every 2 weeks.  Lost testosterone was 682 we will check the blood that test today PSA was normal.    The patient is sad because he has lost his partner due to lung cancer.    His taken Wellbutrin 300 mg Cozaar 100 mg.    Pressure 150/80 pulse is 76 I did advise him to check blood pressure regularly at home I will put him on hydrochlorothiazide 12.5 mg daily.    We discussed diet and weight loss.    Patient will return to clinic in 6 months I did spent 40 minutes with the patient more than 50% was spent on counseling and managing his care.    Component      Latest Ref Rng 10/18/2016   Triglycerides      <=149 mg/dL 394 (H)   Cholesterol      <=199 mg/dL 176   LDL Calculated      <=129 mg/dL 70   HDL Cholesterol      >=40 mg/dL 27 (L)   Patient Fasting > 8hrs?       No   Creatinine      0.70 - 1.30 mg/dL 1.10   GFR MDRD Af Amer      >60 mL/min/1.73m2 >60   GFR MDRD Non Af Amer      >60 mL/min/1.73m2 >60   PSA, Total      <=4.5 ng/mL 0.84   PSA, Free      ng/mL 0.2   Free PSA/PSA Ratio      ratio SEE BELOW   Potassium      3.5 - 5.0 mmol/L 4.4   ALT      0 - 45 U/L 57 (H)   Testosterone      221 - 716 ng/dL 682         Review of Systems:    Nervous System: No headache, dizziness, fainting or memory loss. No tingling sensation of hand or feet.  Ears: No hearing loss or ringing in the ears  Eyes: No blurring of vision, redness, itching or dryness.  Nose: No nosebleed or loss of smell  Mouth: No mouth sores or loss of taste  Throat: No hoarseness or difficulty swallowing  Neck: No enlarged thyroid or lymph nodes.  Heart: No chest pain, palpitation or irregular heartbeat. No swelling of hands or feet  Lungs: No shortness of breath, cough, night sweats, wheezing or  hemoptysis.  Gastrointestinal: No nausea or vomiting, constipation or diarrhea.  No acid reflux, abdominal pain or blood in stools.  Kidney/Bladdr: No polyuria, polydipsia, nocturia or hematuria.  Genital/Sexual: No loss of libido  Skin: No rash, hair loss or hirsutism.  No abnormal striae  Muscles/Joints/Bones: No morning stiffness, muscle aches and pain or loss of height.    Current Medications:  Current Outpatient Prescriptions   Medication Sig     albuterol (PROVENTIL) 2.5 mg /3 mL (0.083 %) nebulizer solution      avanafil (STENDRA) 200 mg Tab Take 200 mg by mouth as needed.     buPROPion (WELLBUTRIN XL) 300 MG 24 hr tablet Take 1 tablet (300 mg total) by mouth every morning.     CIALIS 20 mg tablet TAKE ONE-HALF TABLET BY MOUTH AS NEEDED     fluticasone (FLONASE) 50 mcg/actuation nasal spray 1 spray into each nostril daily.     losartan (COZAAR) 100 MG tablet Take 1 tablet (100 mg total) by mouth daily.     mometasone-formoterol (DULERA) 100-5 mcg/actuation HFAA inhaler Inhale 2 puffs 2 (two) times a day.     oxyCODONE-acetaminophen (PERCOCET) 5-325 mg per tablet Take 1 tablet by mouth every 6 (six) hours as needed for pain.     syringe, disposable, 3 mL Syrg Use As Directed. Use for testosterone injections     testosterone cypionate (DEPOTESTOTERONE CYPIONATE) 200 mg/mL injection INJECT 0.5MLS INTRAMUSCULARLY EVERY 2 WEEKS       Patients Active Problems:  Patient Active Problem List   Diagnosis     Hallux Valgus     Obstructive Sleep Apnea     Testicular hypofunction     Acquired Polycythemia     Male Erectile Disorder     Hypertension     Mild intermittent asthma     Dermatitis     Allergic Rhinitis     Cervicalgia     Rotator Cuff Tendonitis     Post-traumatic Stress Disorder     Erythrocytosis     Obesity     Testicular hyperfunction       History:   reports that he has quit smoking. He has never used smokeless tobacco.   reports that he has quit smoking. He has never used smokeless tobacco. His alcohol  and drug histories are not on file.  History   Smoking Status     Former Smoker   Smokeless Tobacco     Never Used      reports that he has quit smoking. He has never used smokeless tobacco. His alcohol and drug histories are not on file.  History   Sexual Activity     Sexual activity: Not on file     No past medical history on file.  No family history on file.  No past medical history on file.  Past Surgical History:   Procedure Laterality Date     OR APPENDECTOMY      Description: Appendectomy;  Recorded: 09/11/2008;     OR REMOVAL OF TONSILS,<13 Y/O      Description: Tonsillectomy;  Recorded: 07/21/2011;       Vitals   weight is 221 lb (100.2 kg). His blood pressure is 148/80 and his pulse is 76.         Exam  General appearance: The patient looked well, not in acute distress.  Eyes: no evidence of thyroid eye disease.   Retinal exam: No evidence of diabetic retinopathy.  Mouth and Throat: Normal  Neck: No evidence of thyromegaly, enlarged lymph node or tenderness  Chest: Trachea is central. Chest is clear to auscultation and percussion. Breat sounds are normal.  Cardiovascular exam: JVP is not raised. Heart sounds are normal, no murmurs or rub  Peripheral pulses are palpable.   Abdomen: No masses or tenderness.    Back: No vertebral tenderness or kyphosis.  Extremities: No evidence of leg edema.   Skin: Normal to touch.  No abnormal striae  Neurologic exam:  Visual fields are intact by confrontation, grossly intact. No evidence of peripheral neuropathy.  Detailed foot exam normal.        Diagnosis:  Encounter Diagnoses   Name Primary?     Hypogonadism in male        Orders:   No orders of the defined types were placed in this encounter.        Assessment and Plan:    This pleasant 71-year-old male patient is here for follow-up because of hypogonadism is currently on testosterone injection 50 mg IM every 2 weeks.  Lost testosterone was 682 we will check the blood that test today PSA was normal.    The patient is  sad because he has lost his partner due to lung cancer.    His taken Wellbutrin 300 mg Cozaar 100 mg.    Pressure 150/80 pulse is 76 I did advise him to check blood pressure regularly at home I will put him on hydrochlorothiazide 12.5 mg daily.    We discussed diet and weight loss.    Patient will return to clinic in 6 months I did spent 40 minutes with the patient more than 50% was spent on counseling and managing his care.

## 2021-06-09 NOTE — PROGRESS NOTES
Assessment and Plan:       Diagnoses and all orders for this visit:    Encounter for general adult medical examination with abnormal findings  -     Glucose; Future    Abdominal distension  -     XR Abdomen Flat and Upright; Future; Expected date: 3/14/17    Obstructive Sleep Apnea    Mild intermittent asthma without complication    Essential hypertension with goal blood pressure less than 140/90    Erythrocytosis    Testicular hypofunction    Male Erectile Disorder    Insomnia    Colon cancer screening  -     Ambulatory referral for Colonoscopy    Seborrheic dermatitis    Other orders  -     traZODone (DESYREL) 50 MG tablet; Take 1 tablet (50 mg total) by mouth bedtime.  Dispense: 30 tablet; Refill: 6  -     triamcinolone (KENALOG) 0.1 % cream; Apply topically 2 (two) times a day.  Dispense: 45 g; Refill: 0  -     Influenza High Dose, Seasonal 65+ yrs      The patient's current medical problems were reviewed.    I have had an Advance Directives discussion with the patient.  The following high BMI interventions were performed this visit: encouragement to exercise  The following health maintenance schedule was reviewed with the patient and provided in printed form in the after visit summary:   Health Maintenance   Topic Date Due     ASTHMA CONTROL TEST  1945     COLONOSCOPY  05/22/1965     HYPERTENSION FOLLOW-UP  05/22/1965     FALL RISK ASSESSMENT  05/22/2012     ADVANCE DIRECTIVES DISCUSSED WITH PATIENT  12/08/2015     INFLUENZA VACCINE RULE BASED (1) 08/01/2016     TD 18+ HE  10/15/2022     PNEUMOCOCCAL POLYSACCHARIDE VACCINE AGE 65 AND OVER  Completed     PNEUMOCOCCAL CONJUGATE VACCINE FOR ADULTS (PCV13 OR PREVNAR)  Completed     ZOSTER VACCINE  Completed        Subjective:   Chief Complaint: Steven Joyner is an 71 y.o. male here for an Annual Wellness visit.   HPI: Medical history includes hypogonadism is on testosterone replacement under the direction of endocrinology.  He has had an acquired  erythrocytosis which is likely secondary to untreated obstructive sleep apnea but not his testosterone replacement.  He has been seen by hematology in the past.  He has hypertriglyceridemia on gemfibrozil.  He has hypertension takes hydrochlorthiazide and losartan.  Blood pressure is controlled.  Cholesterol numbers are reviewed from February and are ideal.  He has mild intermittent asthma.  Reports no significant symptoms.  Does bring up some concern regarding abdominal distention.  He does have a skin rash noted on the facial area that is consistent with seborrheic dermatitis.  He has experienced the loss of somebody close to him from lung cancer.  Is having some increased difficulty with sleep and this is discussed.    Review of Systems: Please see above.  The rest of the review of systems are negative for all systems.    Patient Care Team:  Kashmir Jackson MD as PCP - General (Family Medicine)     Patient Active Problem List   Diagnosis     Hallux Valgus     Obstructive Sleep Apnea     Testicular hypofunction     Acquired Polycythemia     Male Erectile Disorder     Hypertension     Mild intermittent asthma     Dermatitis     Allergic Rhinitis     Cervicalgia     Rotator Cuff Tendonitis     Post-traumatic Stress Disorder     Erythrocytosis     Obesity     Testicular hyperfunction     Hypercholesterolemia     No past medical history on file.   Past Surgical History:   Procedure Laterality Date     KS APPENDECTOMY      Description: Appendectomy;  Recorded: 09/11/2008;     [place] REMOVAL OF TONSILS,<13 Y/O      Description: Tonsillectomy;  Recorded: 07/21/2011;      No family history on file.   Social History     Social History     Marital status:      Spouse name: N/A     Number of children: N/A     Years of education: N/A     Occupational History     Not on file.     Social History Main Topics     Smoking status: Former Smoker     Smokeless tobacco: Never Used     Alcohol use Not on file     Drug use: Not on  "file     Sexual activity: Not on file     Other Topics Concern     Not on file     Social History Narrative      Current Outpatient Prescriptions   Medication Sig Dispense Refill     albuterol (PROVENTIL) 2.5 mg /3 mL (0.083 %) nebulizer solution        avanafil (STENDRA) 200 mg Tab Take 200 mg by mouth as needed. 5 tablet 6     buPROPion (WELLBUTRIN XL) 300 MG 24 hr tablet Take 1 tablet (300 mg total) by mouth every morning. 90 tablet 0     CIALIS 20 mg tablet TAKE ONE-HALF TABLET BY MOUTH AS NEEDED 10 tablet 6     fluticasone (FLONASE) 50 mcg/actuation nasal spray 1 spray into each nostril daily. 16 g 2     fluticasone-vilanterol (BREO ELLIPTA) 200-25 mcg/dose DsDv inhaler Inhale 1 puff daily.       gemfibrozil (LOPID) 600 MG tablet Take 1 tablet (600 mg total) by mouth 2 (two) times a day before meals. 180 tablet 0     hydroCHLOROthiazide (MICROZIDE) 12.5 mg capsule Take 1 capsule (12.5 mg total) by mouth daily. 30 capsule 6     losartan (COZAAR) 100 MG tablet Take 1 tablet (100 mg total) by mouth daily. 90 tablet 0     syringe, disposable, 3 mL Syrg Use As Directed. Use for testosterone injections       testosterone cypionate (DEPOTESTOTERONE CYPIONATE) 200 mg/mL injection INJECT 0.5MLS INTRAMUSCULARLY EVERY 2 WEEKS 10 mL 1     fenofibrate (TRICOR) 145 MG tablet Take 1 tablet (145 mg total) by mouth daily. 90 tablet 1     mometasone-formoterol (DULERA) 100-5 mcg/actuation HFAA inhaler Inhale 2 puffs 2 (two) times a day. 1 Inhaler 3     oxyCODONE-acetaminophen (PERCOCET) 5-325 mg per tablet Take 1 tablet by mouth every 6 (six) hours as needed for pain. 6 tablet 0     traZODone (DESYREL) 50 MG tablet Take 1 tablet (50 mg total) by mouth bedtime. 30 tablet 6     triamcinolone (KENALOG) 0.1 % cream Apply topically 2 (two) times a day. 45 g 0     No current facility-administered medications for this visit.       Objective:   Vital Signs:   Visit Vitals     /76     Pulse 73     Ht 5' 5\" (1.651 m)     Wt 217 lb " (98.4 kg)     SpO2 95%     BMI 36.11 kg/m2        VisionScreening:  No exam data present     PHYSICAL EXAM  General Appearance:    Alert, cooperative, no distress, appears stated age   Head:    Normocephalic, without obvious abnormality, atraumatic   Eyes:    PERRL, conjunctiva/corneas clear, EOM's intact       Ears:    Normal TM's and external ear canals, both ears   Nose:   Nares normal, septum midline, mucosa normal, no drainage    or sinus tenderness   Throat:   Lips, mucosa, and tongue normal; teeth and gums normal   Neck:   Supple, symmetrical, trachea midline, no adenopathy;        thyroid:  No enlargement/tenderness/nodules   Back:     Symmetric, no curvature, ROM normal, no CVA tenderness   Lungs:     Clear to auscultation bilaterally, respirations unlabored   Chest wall:    No tenderness or deformity   Heart:    Regular rate and rhythm, S1 and S2 normal, no murmur, rub   or gallop   Abdomen:     Soft, non-tender, bowel sounds active all four quadrants,     no masses, no organomegaly   Genitalia:    Normal male without lesion, discharge or tenderness   Rectal:    Normal tone, symmetric enlargement of prostate without nodules, no masses or tenderness;     Extremities:   Extremities normal, atraumatic, no cyanosis or edema   Pulses:   2+ and symmetric all extremities   Skin:   Skin color, texture, turgor normal, no rashes or lesions   Lymph nodes:   Cervical, supraclavicular, and axillary nodes normal   Neurologic:   CNII-XII intact. Normal strength, sensation and reflexes       throughout       Assessment Results 3/14/2017   Activities of Daily Living No help needed   Instrumental Activities of Daily Living No help needed   Mini Cog Total Score 5     A Mini-Cog score of 0-2 suggests the possibility of dementia, score of 3-5 suggests no dementia    Identified Health Risks:     The patient was provided with written information regarding signs of hearing loss.  Information regarding advance directives (living  faria), including where he can download the appropriate form, was provided to the patient via the AVS.

## 2021-06-09 NOTE — TELEPHONE ENCOUNTER
RN cannot approve Refill Request    RN can NOT refill this medication med is not covered by policy/route to provider. Last office visit: 6/11/2019 Kashmir Jackson MD Last Physical: 5/22/2018 Last MTM visit: Visit date not found Last visit same specialty: Visit date not found.  Next visit within 3 mo: Visit date not found  Next physical within 3 mo: Visit date not found      Summer Kate, [facility] Triage/Med Refill 7/5/2020    Requested Prescriptions   Pending Prescriptions Disp Refills     tadalafiL (CIALIS) 20 MG tablet [Pharmacy Med Name: TADALAFIL 20 MG TABLET] 3 tablet 7     Sig: TAKE 1 TABLET BY MOUTH EVERY DAY AS NEEDED       There is no refill protocol information for this order

## 2021-06-10 NOTE — TELEPHONE ENCOUNTER
RN cannot approve Refill Request    RN can NOT refill this medication med is not covered by policy/route to provider. Last office visit: 6/11/2019 Kashmir Jackson MD Last Physical: 5/22/2018 Last MTM visit: Visit date not found Last visit same specialty: Visit date not found.  Next visit within 3 mo: Visit date not found  Next physical within 3 mo: Visit date not found      Nikole Edmond, [facility] Triage/Med Refill 8/13/2020    Requested Prescriptions   Pending Prescriptions Disp Refills     tadalafiL (CIALIS) 20 MG tablet [Pharmacy Med Name: TADALAFIL 20 MG TABLET] 4 tablet 5     Sig: TAKE 1 TABLET BY MOUTH EVERY DAY AS NEEDED       There is no refill protocol information for this order

## 2021-06-10 NOTE — PROGRESS NOTES
Assessment and Plan:     1. Fatigue, unspecified type  Ambulatory referral to Sleep Medicine    2(CBC w/o Differential)    Comprehensive Metabolic Panel    Vitamin B12    Thyroid Cascade    Erythrocyte Sedimentation Rate    Lyme Antibody Cascade    Antinuclear Antibody (RACHEL) Cascade    Rheumatoid Factor Quant   2. Acquired Polycythemia  2(CBC w/o Differential)    Ambulatory referral to Oncology/Hematology Adult   3. Hypertension     4. Obstructive sleep apnea     5. Type 2 diabetes mellitus without complication, without long-term current use of insulin (H)  Glycosylated Hemoglobin A1c   6. Colon cancer screening  Cologuard     Will rule out anemia, infection, vitamin deficiencies, autoimmune disease, Lyme's.  Fatigue is likely due to uncontrolled sleep apnea.  Will refer patient to sleep center for further evaluation.  Patient is concerned of possible narcolepsy.  He also has polycythemia which has not been evaluated for multiple years.  Will refer to hematology.  Blood pressure and diabetes are controlled.  He is content with the plan.    Subjective:     Steven is a 75 y.o. male presenting to the clinic for concerns of ongoing fatigue.  Patient has a history of MAUDE and has been unable to tolerate CPAP.  He has gained 20 pounds in the past 6 months.  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  Patient has been experiencing fatigue over the past year.  He has been falling asleep in the car.  He is sleeping approximately 4 to 5 hours at night and then will nap for 2 to 3 hours during the day.  He denies recent tick bites.  He has not had any rashes or worsening joint pain.  He denies any muscular weakness or paresthesias.  Patient does have type 2 diabetes which has been controlled.  He has been diagnosed with polycythemia and has not seen a hematologist for multiple years.  Blood pressure is been well controlled.    Review of Systems: A complete 14 point review of systems was obtained  and is negative or as stated in the history of present illness.    Social History     Socioeconomic History     Marital status:      Spouse name: Not on file     Number of children: Not on file     Years of education: Not on file     Highest education level: Not on file   Occupational History     Not on file   Social Needs     Financial resource strain: Not on file     Food insecurity     Worry: Not on file     Inability: Not on file     Transportation needs     Medical: Not on file     Non-medical: Not on file   Tobacco Use     Smoking status: Former Smoker     Smokeless tobacco: Never Used   Substance and Sexual Activity     Alcohol use: Not on file     Drug use: Not on file     Sexual activity: Not on file   Lifestyle     Physical activity     Days per week: Not on file     Minutes per session: Not on file     Stress: Not on file   Relationships     Social connections     Talks on phone: Not on file     Gets together: Not on file     Attends [employer] service: Not on file     Active member of club or organization: Not on file     Attends meetings of clubs or organizations: Not on file     Relationship status: Not on file     Intimate partner violence     Fear of current or ex partner: Not on file     Emotionally abused: Not on file     Physically abused: Not on file     Forced sexual activity: Not on file   Other Topics Concern     Not on file   Social History Narrative     Not on file       Active Ambulatory Problems     Diagnosis Date Noted     Hallux Valgus      Obstructive sleep apnea      Testicular hypofunction      Acquired Polycythemia      Male Erectile Disorder      Hypertension      Mild intermittent asthma      Dermatitis      Allergic Rhinitis      Cervicalgia      Rotator Cuff Tendonitis      Post-traumatic Stress Disorder      Erythrocytosis 05/08/2015     Obesity 08/05/2015     Testicular hyperfunction 11/02/2016     Hypercholesterolemia 02/28/2017     BPH (benign prostatic hyperplasia)  01/30/2018     Weight gain 10/26/2017     Dietary counseling and surveillance 10/26/2017     Bloating 02/09/2018     Abnormal weight gain 10/30/2017     Abdominal pain 02/20/2009     MAUDE (obstructive sleep apnea) 11/11/2011     Hypogonadism, male 11/08/2011     Hallux limitus of left foot 10/11/2018     Resolved Ambulatory Problems     Diagnosis Date Noted     Lymphadenopathy      Otitis Media      Acute bronchitis      Finger Injury      Acute conjunctivitis, unspecified 02/26/2015     No Additional Past Medical History       No family history on file.    Objective:     /86   Pulse 86   Wt (!) 224 lb 1.6 oz (101.7 kg)   SpO2 97%   BMI 37.29 kg/m      Patient is alert, in no obvious distress. He is hard of hearing   Skin: Warm, dry.  No lesions or rashes.  Skin turgor rapid return.   HEENT:  Head normocephalic, atraumatic.  Eyes normal.  Ears normal.  Nose patent, mucosa pink.  Oropharynx mucosa pink.  No lesions or tonsillar enlargement.   Neck: Supple, no lymphadenopathy.  No thyromegaly.  Lungs:  Clear to auscultation. Respirations even and unlabored.  No wheezing or rales noted.   Heart:  Regular rate and rhythm.  No murmurs, S3, S4, gallops, or rubs.    Abdomen: Soft, nontender.  No organomegaly. Bowel sounds normoactive. No guarding or masses noted.   Musculoskeletal:  No edema is present in bilateral lower extremities.

## 2021-06-11 NOTE — PROGRESS NOTES
Patient ID: Steven Joyner is a 72 y.o. male.  /86  Pulse 74  Wt 222 lb (100.7 kg)  SpO2 95%  BMI 36.94 kg/m2    Assessment/Plan:                Diagnoses and all orders for this visit:    Weight gain    Sleep apnea    PTSD (post-traumatic stress disorder)    Hypertriglyceridemia    Low HDL (under 40)      DISCUSSION  Discussed the issues as outlined in more detail below.    For the weight gain recommend continued efforts to track calories.  Stop medications including the gemfibrozil and Wellbutrin which she has done and monitor weight off of the medications.  The impact these medications have had on his weight gain is unclear.    I would recommend he continue to investigate potential tolerable treatment options for his sleep apnea.    No current medication treatment for PTSD continue to monitor over time.    Do not feel it is necessary to restart medication specifically for triglycerides or HDL cholesterol at this point in time.    Duration of today's visit exceeded 25 minutes more than 50% of that time is spent in counseling and coordination of care.  Subjective:     HPI    Steven Joyner is a 72-year-old man with a complex medical history.  He has hypogonadism on testosterone replacement, he has sleep apnea and has not been treated because of intolerance to treatment.  He has investigated multiple different avenues for treatment and is either not a candidate or cannot tolerate treatment.  He remains tired often.  He is physically active and an avid weight .  He had been started on initially TriCor which was changed to gemfibrozil because of cost issues related to hypertriglyceridemia and low HDL cholesterol.  Patient states that after starting the gemfibrozil he felt that his weight increased dramatically.  He feels there are no other factors that would lead to such weight gain.  He stopped the medication.  He was interested in an alternative.  We discussed that beyond TriCor and gemfibrozil  there is not any alternative.  I discussed with him in my experience I do not associate significant weight gain with gemfibrozil.  We discussed that it is reasonable to stop the medication and see if his weight changes.  Based on his triglyceride elevation and HDL level taking in conjunction overall vascular risk it would be reasonable to go without such medication for the time being.  He inquires about other weight loss treatment medications including GoLo.  We discussed that this is an over-the-counter supplement medication.  We discussed how his untreated sleep apnea likely impacts his metabolism and definitely impacts how he feels in terms of fatigue.  He had elected to stop his Wellbutrin.  He was placed on this for PTSD symptoms in the past.  Initially felt that this had done well but felt he no longer needed the medication.  Denies any significant concerns along this line.    Review of Systems  Complete review of systems is obtained.  Other than the specific considerations noted above complete review of systems is negative.      Objective:   Medications:  Current Outpatient Prescriptions   Medication Sig Note     albuterol (PROVENTIL) 2.5 mg /3 mL (0.083 %) nebulizer solution  4/3/2015: Received from: External Pharmacy     avanafil (STENDRA) 200 mg Tab Take 200 mg by mouth as needed.      BREO ELLIPTA 100-25 mcg/dose DsDv inhaler INHALE 1 PUFF DAILY 6/26/2017: Received from: External Pharmacy     CIALIS 20 mg tablet TAKE ONE-HALF TABLET BY MOUTH AS NEEDED      fluticasone-vilanterol (BREO ELLIPTA) 200-25 mcg/dose DsDv inhaler Inhale 1 puff daily.      hydroCHLOROthiazide (MICROZIDE) 12.5 mg capsule Take 1 capsule (12.5 mg total) by mouth daily.      losartan (COZAAR) 100 MG tablet TAKE 1 TABLET BY MOUTH EVERY DAY      mometasone-formoterol (DULERA) 100-5 mcg/actuation HFAA inhaler Inhale 2 puffs 2 (two) times a day.      syringe, disposable, 3 mL Syrg Use As Directed. Use for testosterone injections       testosterone cypionate (DEPOTESTOTERONE CYPIONATE) 200 mg/mL injection INJECT 0.5MLS INTRAMUSCULARLY EVERY 2 WEEKS      triamcinolone (KENALOG) 0.1 % cream Apply topically 2 (two) times a day.      Allergies:  No Known Allergies    Tobacco:   reports that he has quit smoking. He has never used smokeless tobacco.     Physical Exam      /86  Pulse 74  Wt 222 lb (100.7 kg)  SpO2 95%  BMI 36.94 kg/m2    General Appearance:    Alert, cooperative, no distress

## 2021-06-12 NOTE — TELEPHONE ENCOUNTER
RN cannot approve Refill Request    RN can NOT refill this medication med is not covered by policy/route to provider. Last office visit: 6/11/2019 Kashmir Jackson MD Last Physical: 5/22/2018 Last MTM visit: Visit date not found Last visit same specialty: Visit date not found.  Next visit within 3 mo: Visit date not found  Next physical within 3 mo: Visit date not found      Sherri Harry, [facility] Triage/Med Refill 11/2/2020    Requested Prescriptions   Pending Prescriptions Disp Refills     tadalafiL (CIALIS) 20 MG tablet [Pharmacy Med Name: TADALAFIL 20 MG TABLET] 4 tablet 5     Sig: TAKE 1 TABLET BY MOUTH EVERY DAY AS NEEDED       There is no refill protocol information for this order

## 2021-06-13 NOTE — TELEPHONE ENCOUNTER
RN cannot approve Refill Request    RN can NOT refill this medication med is not covered by policy/route to provider. Last office visit: 6/11/2019 Kashmir Jackson MD Last Physical: 5/22/2018 Last MTM visit: Visit date not found Last visit same specialty: Visit date not found.  Next visit within 3 mo: Visit date not found  Next physical within 3 mo: Visit date not found      Dia Nieves, [facility] Triage/Med Refill 12/4/2020    Requested Prescriptions   Pending Prescriptions Disp Refills     tadalafiL (CIALIS) 20 MG tablet [Pharmacy Med Name: TADALAFIL 20 MG TABLET] 4 tablet 5     Sig: TAKE 1 TABLET BY MOUTH EVERY DAY AS NEEDED       There is no refill protocol information for this order

## 2021-06-14 NOTE — TELEPHONE ENCOUNTER
RN cannot approve Refill Request    RN can NOT refill this medication Protocol failed and NO refill given. Last office visit: 6/11/2019 Kashmir Jackson MD Last Physical: 5/22/2018 Last MTM visit: Visit date not found Last visit same specialty: Visit date not found.  Next visit within 3 mo: Visit date not found  Next physical within 3 mo: Visit date not found      Patricia Bang, [facility] Triage/Med Refill 1/16/2021    Requested Prescriptions   Pending Prescriptions Disp Refills     tadalafiL (CIALIS) 20 MG tablet [Pharmacy Med Name: TADALAFIL 20 MG TABLET] 4 tablet 5     Sig: TAKE 1 TABLET BY MOUTH EVERY DAY AS NEEDED       There is no refill protocol information for this order

## 2021-06-15 NOTE — TELEPHONE ENCOUNTER
RN cannot approve Refill Request    RN can NOT refill this medication med is not covered by policy/route to provider. Last office visit: 6/11/2019 Kashmir Jackson MD Last Physical: 5/22/2018 Last MTM visit: Visit date not found Last visit same specialty: Visit date not found.  Next visit within 3 mo: Visit date not found  Next physical within 3 mo: Visit date not found      Trinidad Gomez, [facility] Triage/Med Refill 3/13/2021    Requested Prescriptions   Pending Prescriptions Disp Refills     tadalafiL (CIALIS) 20 MG tablet [Pharmacy Med Name: TADALAFIL 20 MG TABLET] 4 tablet 5     Sig: TAKE 1 TABLET BY MOUTH EVERY DAY AS NEEDED       There is no refill protocol information for this order

## 2021-06-15 NOTE — PROGRESS NOTES
Patient ID: Steven Joyner is a 72 y.o. male.  /86  Pulse 83  Wt (!) 226 lb (102.5 kg)  SpO2 96%  BMI 37.61 kg/m2    Assessment/Plan:                Diagnoses and all orders for this visit:    Weight gain    Obstructive sleep apnea    Hypogonadism in male    Other orders  -     Influenza High Dose, Seasonal 65+ yrs      DISCUSSION  Duration of visit exceeded 25 minutes discussing Tello as outlined below.  Flu shot today  Subjective:     HPI    Steven Joyner is a 72 y.o. male with a complex medical history that includes hypogonadism on testosterone replacement and obstructive sleep apnea.  He is here today to discuss his concerns regarding continued weight gain.  He is an avid weight .  He states that he is closing in on being able to dead left 500 pounds and is quite proud of this.  He is otherwise active and count his calories very closely.  He really feels like there was a point in time where he all of a sudden gained a lot of weight and then has not been able to lose it.  We have discussed this previously and he has discussed this with other specialty providers.  With his sleep apnea he is not on treatment because he cannot tolerate the mask.  He has been evaluated for the implantable device but it was deemed that he did not qualify for this.  He has hypogonadism and is on testosterone replacement under the direction of endocrinology.  Review of his testosterone levels revealed that there seems to be generally a good range.  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  He is previously seen hematology there is been no significant changes with his lab tests in this regard.    In regard to weight gain it is difficult to determine an exact cause.  I discussed with him my opinion as to what in his medical situation and nutritional situation may be  contributing.    Does have a relatively high proportion of carbohydrates in his diet through rice potatoes and juice.  Discussed trying to lower the proportion of carbohydrates in favor of more protein and fiber especially from vegetables.    His sleep apnea probably contributes to changes in his overall metabolism.  Discussed that this is difficult to prove or really know for certain how much it contributes but it is likely to be a factor.  In addition his age is probably a factor with changing metabolism in general.    His testosterone replacement probably does not mimic exactly what his testosterone level would otherwise be and based on his desire to lift weights and his supplementation of testosterone to replace his low testosterone probably contributes to a higher proportion of muscle mass which in turn contributes to weight gain.  Again it is difficult to prove or know for certain how much this is a contributing factor.    Plans to discuss his considerations further with his specialty providers.  He will try to adjust his proportion of carbohydrates and stay active.  He will inquire with his dentist about obtaining a mouthguard for a mode of treatment for his obstructive sleep apnea.    Review of Systems  Complete review of systems is obtained.  Other than the specific considerations noted above complete review of systems is negative.        Objective:   Medications:  Current Outpatient Prescriptions   Medication Sig Note     albuterol (PROVENTIL) 2.5 mg /3 mL (0.083 %) nebulizer solution  4/3/2015: Received from: External Pharmacy     avanafil (STENDRA) 200 mg Tab Take 200 mg by mouth as needed.      BREO ELLIPTA 100-25 mcg/dose DsDv inhaler INHALE 1 PUFF DAILY 6/26/2017: Received from: External Pharmacy     CIALIS 20 mg tablet TAKE ONE-HALF TABLET BY MOUTH AS NEEDED      fluticasone-vilanterol (BREO ELLIPTA) 200-25 mcg/dose DsDv inhaler Inhale 1 puff daily.      hydroCHLOROthiazide (MICROZIDE) 12.5 mg  capsule Take 1 capsule (12.5 mg total) by mouth daily.      losartan (COZAAR) 100 MG tablet TAKE 1 TABLET BY MOUTH EVERY DAY      mometasone-formoterol (DULERA) 100-5 mcg/actuation HFAA inhaler Inhale 2 puffs 2 (two) times a day.      syringe, disposable, 3 mL Syrg Use As Directed. Use for testosterone injections      testosterone cypionate (DEPOTESTOTERONE CYPIONATE) 200 mg/mL injection INJECT 0.5MLS INTRAMUSCULARLY EVERY 2 WEEKS      triamcinolone (KENALOG) 0.1 % cream Apply topically 2 (two) times a day.      Allergies:  No Known Allergies    Tobacco:   reports that he has quit smoking. He has never used smokeless tobacco.     Physical Exam      /86  Pulse 83  Wt (!) 226 lb (102.5 kg)  SpO2 96%  BMI 37.61 kg/m2        General Appearance:    Alert, cooperative, no distress

## 2021-06-16 NOTE — PROGRESS NOTES
Progress Note    Reason for Visit:  Chief Complaint     Hypogonadism          Progress Note:    HPI:   Hypogonadism this pleasant 72-year-old male patient is here for preoperative hypogonadism.    Review of Systems:    Nervous System: No headache, dizziness, fainting or memory loss. No tingling sensation of hand or feet.  Ears: No hearing loss or ringing in the ears  Eyes: No blurring of vision, redness, itching or dryness.  Nose: No nosebleed or loss of smell  Mouth: No mouth sores or loss of taste  Throat: No hoarseness or difficulty swallowing  Neck: No enlarged thyroid or lymph nodes.  Heart: No chest pain, palpitation or irregular heartbeat. No swelling of hands or feet  Lungs: No shortness of breath, cough, night sweats, wheezing or hemoptysis.  Gastrointestinal: No nausea or vomiting, constipation or diarrhea.  No acid reflux, abdominal pain or blood in stools.  Kidney/Bladdr: No polyuria, polydipsia, nocturia or hematuria.  Genital/Sexual: No loss of libido  Skin: No rash, hair loss or hirsutism.  No abnormal striae  Muscles/Joints/Bones: No morning stiffness, muscle aches and pain or loss of height.    Current Medications:  Current Outpatient Prescriptions   Medication Sig     BREO ELLIPTA 100-25 mcg/dose DsDv inhaler INHALE 1 PUFF DAILY     CIALIS 20 mg tablet TAKE ONE-HALF TABLET BY MOUTH AS NEEDED     losartan (COZAAR) 100 MG tablet TAKE 1 TABLET BY MOUTH EVERY DAY     mometasone-formoterol (DULERA) 100-5 mcg/actuation HFAA inhaler Inhale 2 puffs 2 (two) times a day.     syringe, disposable, 3 mL Syrg Use As Directed. Use for testosterone injections     testosterone cypionate (DEPOTESTOTERONE CYPIONATE) 200 mg/mL injection INJECT 0.5MLS INTRAMUSCULARLY EVERY 2 WEEKS     triamcinolone (KENALOG) 0.1 % cream Apply topically 2 (two) times a day.     hydroCHLOROthiazide (MICROZIDE) 12.5 mg capsule Take 1 capsule (12.5 mg total) by mouth daily.       Patients Active Problems:  Patient Active Problem List  "  Diagnosis     Hallux Valgus     Obstructive sleep apnea     Testicular hypofunction     Acquired Polycythemia     Male Erectile Disorder     Hypertension     Mild intermittent asthma     Dermatitis     Allergic Rhinitis     Cervicalgia     Rotator Cuff Tendonitis     Post-traumatic Stress Disorder     Erythrocytosis     Obesity     Testicular hyperfunction     Hypercholesterolemia     BPH (benign prostatic hyperplasia)     Weight gain     Dietary counseling and surveillance     Bloating     Abnormal weight gain     Abdominal pain       History:   reports that he has quit smoking. He has never used smokeless tobacco.   reports that he has quit smoking. He has never used smokeless tobacco. His alcohol and drug histories are not on file.  History   Smoking Status     Former Smoker   Smokeless Tobacco     Never Used      reports that he has quit smoking. He has never used smokeless tobacco. His alcohol and drug histories are not on file.  History   Sexual Activity     Sexual activity: Not on file     No past medical history on file.  No family history on file.  No past medical history on file.  Past Surgical History:   Procedure Laterality Date     CO APPENDECTOMY      Description: Appendectomy;  Recorded: 09/11/2008;     CO REMOVAL OF TONSILS,<11 Y/O      Description: Tonsillectomy;  Recorded: 07/21/2011;       Vitals   height is 5' 5\" (1.651 m) and weight is 224 lb (101.6 kg) (abnormal). His blood pressure is 150/84.         Exam  General appearance: The patient looked well, not in acute distress.  Eyes: no evidence of thyroid eye disease.   Retinal exam: No evidence of diabetic retinopathy.  Mouth and Throat: Normal  Neck: No evidence of thyromegaly, enlarged lymph node or tenderness  Chest: Trachea is central. Chest is clear to auscultation and percussion. Breat sounds are normal.  Cardiovascular exam: JVP is not raised. Heart sounds are normal, no murmurs or rub  Peripheral pulses are palpable.   Abdomen: No " masses or tenderness.    Back: No vertebral tenderness or kyphosis.  Extremities: No evidence of leg edema.   Skin: Normal to touch.  No abnormal striae  Neurologic exam:  Visual fields are intact by confrontation, grossly intact. No evidence of peripheral neuropathy.  Detailed foot exam normal.        Diagnosis:  No diagnosis found.    Orders:   No orders of the defined types were placed in this encounter.        Assessment and Plan:  Hypogonadism the patient is on testosterone injection 80 mg every 2 weeks.  He is absolutely adamant to continue on testosterone despite me advising him against that.  He uses a testosterone to help him with exercise.    I put the patient has hypertriglyceridemia I put him on gemfibrozil and he stayed on it less than 1 month and he claims he gained 25 pounds after that I explained to him that gemfibrozil has not been to do is gaining weight if anything it can cause GI upset and weight loss.    After very lengthy discussion he wanted to continue on testosterone and try diet and exercise.    Estimated losartan 100 mg once a day hydrochlorothiazide 12.5 mg daily I did put him on the hydrochlorothiazide last time but he did not start.    His PSA 0.6.  Total testosterone is 417  Alt 64.    I advised the patient to use a low-dose testosterone dose possible and symmetric in 6 months I did spend 40 minutes with the patient was in 50% was spent on counseling and managing his care.

## 2021-06-16 NOTE — TELEPHONE ENCOUNTER
Reason contacted:  referral  Information relayed:    Patient is calling as he has yet to hear from the spine care clinic to arrange an appointment.    Per review of chart it doesn't appear an order for this was placed.  Per 4/16/2021 OV note:   DISCUSSION  Exam findings and clinical history are significant for SI joint dysfunction, there is likely accompanying muscle spasm leading to nerve impingement leading to a strain sensation in the right lower leg.  Recommend application of topical pain reliever, continue with ice and heat which has been beneficial.  Will refer to the spine clinic for help in formulating a further treatment plan.     Obtain labs as noted above for chronic medical concerns.  Blood pressure noted to be elevated blood pressure readings are reported to be better at home, continue to monitor for the time being and adjust treatment in the future if indicated.       Please place an order to the spine clinic.     Additional questions:  No  Further follow-up needed:  Yes  Okay to leave a detailed message:  Yes

## 2021-06-16 NOTE — PATIENT INSTRUCTIONS - HE
An order for physical therapy has been provided today.  Someone will call you to schedule physical therapy or you can call [phone] to schedule physical therapy.  It will be very important for you to do your physical therapy exercises on a regular basis to decrease your pain and prevent future flares of pain.     [facility] Scheduling    Please call [phone] to schedule your image(s) (select option #1). There are 2 different locations, see below. You can do walk-in visits for xray only images if you want.     [facility]  [address] [address]      [place] [id number] [facility] [address]

## 2021-06-16 NOTE — PROGRESS NOTES
Patient ID: Steven Joyner is a 75 y.o. male.  /84   Pulse 77   Wt 215 lb (97.5 kg)   SpO2 98%   BMI 35.78 kg/m      Assessment/Plan:                   Diagnoses and all orders for this visit:    SI (sacroiliac) joint dysfunction    Type 2 diabetes mellitus without complication, without long-term current use of insulin (H)  -     Basic Metabolic Panel  -     Glycosylated Hemoglobin A1c    Polycythemia  -     HM1(CBC and Differential)          DISCUSSION  Exam findings and clinical history are significant for SI joint dysfunction, there is likely accompanying muscle spasm leading to nerve impingement leading to a strain sensation in the right lower leg.  Recommend application of topical pain reliever, continue with ice and heat which has been beneficial.  Will refer to the spine clinic for help in formulating a further treatment plan.    Obtain labs as noted above for chronic medical concerns.  Blood pressure noted to be elevated blood pressure readings are reported to be better at home, continue to monitor for the time being and adjust treatment in the future if indicated.  Subjective:     HPI    Steven Joyner is a 75 y.o. male is here today to discuss concerns regarding a focal area of pain in the right lower back with an unusual sensation in the right leg.  He states over the course of approximately 2 weeks just after he lifted a heavy garage door and turned in a funny way that he began experiencing significant spasm-like pain in the region consistent with the right SI joint.  He states he gets a spasming severe pain at times.  Accompanying this he will have a strange throbbing sensation in his right lower leg on the outer anterior portion.  The pain is at times severe.  He has seen his chiropractor who described SI joint dysfunction with nerve impingement.  He also describes talking to a physician friend who had a similar take on the situation.  He states he is still been able to do competitive  "lifting and is squatting 315 pounds multiple times, working toward a 500 pound dead lift and a 300 pound bench press.  He states that he wants stop lifting and that actually lifting does not bother the pain or exacerbate it, the pain typically comes on when he is simply sitting still.    Other medical history includes hypertension, sleep apnea, type 2 diabetes diet-controlled, polycythemia, hypogonadism on testosterone replacement.  He is overdue for laboratory work and willing to have labs done today.  Reports no other symptom concerns today.    Review of Systems  Complete review of systems is obtained.  Other than the specific considerations noted above complete review of systems is negative.      Objective:   Medications:  Current Outpatient Medications   Medication Sig Note     amLODIPine (NORVASC) 5 MG tablet TAKE 1 TABLET BY MOUTH EVERY DAY      aspirin 81 MG EC tablet TAKE 1 TABLET BY MOUTH EVERY DAY      blood glucose meter (GLUCOMETER) Use 1 each As Directed daily. Dispense glucometer brand per patient's insurance at pharmacy discretion.      BREO ELLIPTA 100-25 mcg/dose DsDv inhaler INHALE 1 PUFF DAILY 6/26/2017: Received from: External Pharmacy     celecoxib (CELEBREX) 200 MG capsule TAKE 1 CAPSULE BY MOUTH DAILY WITH MEAL AS NEEDED      gemfibrozil (LOPID) 600 MG tablet TAKE 1 TABLET (600 MG TOTAL) BY MOUTH 2 (TWO) TIMES A DAY BEFORE MEALS.      generic lancets (FINGERSTIX LANCETS) Check blood sugar once or twice a week. Dispense brand per patient's insurance at pharmacy discretion.      glimepiride (AMARYL) 1 MG tablet Take 1 tablet (1 mg total) by mouth daily.      HYPODERMIC NEEDLES 18 gauge x 1 1/2\" Ndle USE EVERY 2 WEEKS TO WITHDRAW TESTOSTERONE FROM VIAL      losartan (COZAAR) 25 MG tablet TAKE 1 TABLET BY MOUTH EVERY DAY      ONETOUCH ULTRA BLUE TEST STRIP strips CHECK BLOOD SUGARS ONCE OR TWICE A WEEK      syringe with cannula,disposabl ([employer] BLUNT CANNULA) 3 mL 18 x 1\" Syrg As directed. " use every 2 weeks for testosterone      syringe, disposable, 3 mL Syrg Use As Directed. Use for testosterone injections      tadalafiL (CIALIS) 20 MG tablet TAKE 1 TABLET BY MOUTH EVERY DAY AS NEEDED      testosterone cypionate (DEPOTESTOTERONE CYPIONATE) 200 mg/mL injection INJECT 0.5MLS INTRAMUSCULARLY EVERY 2 WEEKS      Allergies:  Allergies   Allergen Reactions     Cat's Claw (Uncaria Tomentosa) Unknown     Dog Dander      Tobacco:   reports that he has quit smoking. He has never used smokeless tobacco.     Physical Exam      /84   Pulse 77   Wt 215 lb (97.5 kg)   SpO2 98%   BMI 35.78 kg/m        General: Patient alert no signs of distress    Examination of his low back reveals no bruising redness swelling or deformities.  There is tenderness to palpation although relatively mild in the upper portion of the right SI joint.  This is reported as the origin of pain.  No pain on palpation in other regions.  Good range of motion and good strength in lower extremities is noted.  No limitation of the hip joint knee joint ankle joint.  Can squat remain squatting and then return to a standing position without difficulty or pain.

## 2021-06-16 NOTE — PROGRESS NOTES
"ASSESSMENT: Steven Joyner is a 75 y.o. male with past medical history significant for obstructive sleep apnea, polycythemia, hypertension, asthma, PTSD, obesity, hypercholesterolemia, BPH, hypogonadism who presents today for new patient evaluation of acute right buttock pain with occasional right groin and right anterior shin pain which began after lifting a garage door.  Pain is most consistent with sacroiliac joint dysfunction with secondary myofascial pain/muscle spasm.  He had a CT abdomen and pelvis from 2015 which showed degenerative change in the SI joints.  However, SI joints provocative testing today was mostly negative.  Within the differential is gluteal muscle strain.  I think less likely is lumbar radiculitis from disc herniation since patient does not have any significant back pain and pain down the leg does not follow a typical radicular pattern.    PETRA:26  Who 5:20    PLAN:  A shared decision making model was used.  The patient's values and choices were respected.  The following represents what was discussed and decided upon by the physician assistant and the patient.      1.  DIAGNOSTIC TESTS: I reviewed the report of the CT abdomen and pelvis from 2015.  I ordered an x-ray of the pelvis and right hip.  -If symptoms persist he may need an MRI lumbar spine and/or MRI pelvis.    2.  PHYSICAL THERAPY:  Discussed the importance of core strengthening, ROM, stretching exercises with the patient and how each of these entities is important in decreasing pain.  Explained to the patient that the purpose of physical therapy is to teach the patient a home exercise program.  These exercises need to be performed every day in order to decrease pain and prevent future occurrences of pain.  I entered a referral for physical therapy.    3.  MEDICATIONS:    -Patient will continue take Celebrex 200 mg daily.  -Patient can also use Tylenol as needed.  -Patient states he has some leftover \"pain\" medications from prior " surgeries that he has been taking.  He does not know the name of what he is taking.  I did not provide any additional opiates today.  He asked if he might benefit from Dilaudid.  I would recommend we avoid opiates.  -A Toradol injection was given in the clinic.    4.  INTERVENTIONS: No interventions were ordered.  Patient to potentially benefit from interventional pain management if he fails to improve with conservative treatment, depending on the results of imaging studies.  Based on his exam today would likely begin with a right sacroiliac joint injection.    5.  PATIENT EDUCATION: Patient is in agreement the above plan.  All questions were answered.    6.  FOLLOW-UP:   Patient will follow up with me in 2 weeks.  If he has questions or concerns in the meantime, he should not hesitate to call.      SUBJECTIVE:  Steven Joyner  Is a 75 y.o. male who presents today in consultation request of Dr. Jackson for new patient evaluation of acute right buttock pain and right lower extremity pain.  Patient reports that 2 weeks ago he lifted a broken garage door.  He felt okay at the time but the next day he woke up with severe pain.  Patient reports he has had this same type of pain once every 5 to 7 years in the past.  It typically resolves within about 1 week after he self manipulates his lower back/pelvis by twisting.  He reports that with this episode of pain it did not help.  He has been going to the chiropractor but he is not making progress.  He was referred to our clinic for further evaluation and treatment.  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    Patient complains of pain localized to the right buttock.  Patient points to the right sacroiliac joint when asked to point to the pain.  He states that he is experiencing muscle spasm in that area.  He reports he has some pain that extends into the right medial groin.  He also has intermittent pain on the right  "anteromedial shin, however, he denies any pain radiating from the buttock down the leg to the shin.  He denies any pain in the back itself.  Denies any numbness, tingling, or weakness down the leg.  He denies any aggravating factors to the pain.  Pain is alleviated temporarily with applying heat and ice.  Denies any left-sided symptoms.  Denies loss of bowel or bladder control.  Denies any recent fevers, chills, or sweats.    Patient has not had any physical therapy.  He has been going to the chiropractor but is not making progress.  He has never had any spine surgeries or spine injections.  Patient reports that he has many leftover pain medications from prior surgeries at home.  He has been taking some pain pills but he does not know what they are called.  He is also using Celebrex 200 mg daily.  Medications are helpful.    Current Outpatient Medications on File Prior to Visit   Medication Sig Dispense Refill     amLODIPine (NORVASC) 5 MG tablet TAKE 1 TABLET BY MOUTH EVERY DAY 90 tablet 0     aspirin 81 MG EC tablet TAKE 1 TABLET BY MOUTH EVERY DAY 30 tablet 0     blood glucose meter (GLUCOMETER) Use 1 each As Directed daily. Dispense glucometer brand per patient's insurance at pharmacy discretion. 1 each 0     BREO ELLIPTA 100-25 mcg/dose DsDv inhaler INHALE 1 PUFF DAILY  6     celecoxib (CELEBREX) 200 MG capsule TAKE 1 CAPSULE BY MOUTH DAILY WITH MEAL AS NEEDED       gemfibrozil (LOPID) 600 MG tablet TAKE 1 TABLET (600 MG TOTAL) BY MOUTH 2 (TWO) TIMES A DAY BEFORE MEALS. 180 tablet 0     generic lancets (FINGERSTIX LANCETS) Check blood sugar once or twice a week. Dispense brand per patient's insurance at pharmacy discretion. 100 each 0     glimepiride (AMARYL) 1 MG tablet Take 1 tablet (1 mg total) by mouth daily. 90 tablet 0     HYPODERMIC NEEDLES 18 gauge x 1 1/2\" Ndle USE EVERY 2 WEEKS TO WITHDRAW TESTOSTERONE FROM VIAL  3     losartan (COZAAR) 25 MG tablet TAKE 1 TABLET BY MOUTH EVERY DAY 90 tablet 0     " "ONETOUCH ULTRA BLUE TEST STRIP strips CHECK BLOOD SUGARS ONCE OR TWICE A WEEK 100 strip 1     syringe with cannula,disposabl ([employer] BLUNT CANNULA) 3 mL 18 x 1\" Syrg As directed. use every 2 weeks for testosterone       syringe, disposable, 3 mL Syrg Use As Directed. Use for testosterone injections       tadalafiL (CIALIS) 20 MG tablet TAKE 1 TABLET BY MOUTH EVERY DAY AS NEEDED 4 tablet 5     testosterone cypionate (DEPOTESTOTERONE CYPIONATE) 200 mg/mL injection INJECT 0.5MLS INTRAMUSCULARLY EVERY 2 WEEKS 10 mL 1         Allergies   Allergen Reactions     Cat's Claw (Uncaria Tomentosa) Unknown     Dog Dander        Patient Active Problem List   Diagnosis     Hallux Valgus     Obstructive sleep apnea     Testicular hypofunction     Acquired Polycythemia     Male Erectile Disorder     Hypertension     Mild intermittent asthma     Dermatitis     Allergic Rhinitis     Cervicalgia     Rotator Cuff Tendonitis     Post-traumatic Stress Disorder     Erythrocytosis     Obesity     Testicular hyperfunction     Hypercholesterolemia     BPH (benign prostatic hyperplasia)     Weight gain     Dietary counseling and surveillance     Bloating     Abnormal weight gain     Abdominal pain     MAUDE (obstructive sleep apnea)     Hypogonadism, male     Hallux limitus of left foot         Past Surgical History:   Procedure Laterality Date     DE APPENDECTOMY      Description: Appendectomy;  Recorded: 09/11/2008;     DE REMOVAL OF TONSILS,<15 Y/O      Description: Tonsillectomy;  Recorded: 07/21/2011;   Bilateral knee surgery  Wrist surgery  Bilateral foot surgery  Nose surgery  Jaw surgery    Family history: None.  Patient reports family is healthy.    Social history: Patient is retired.  He is a competitive weightlifter.  He denies tobacco, alcohol, or illicit drug use.      ROS: Positive for joint pain, sciatica.  Specifically negative for bowel/bladder dysfunction, fevers,chills, appetite changes, unexplained weight loss.   " Otherwise 13 systems reviewed are negative.  Please see the patient's intake questionnaire from today for details.      OBJECTIVE:  PHYSICAL EXAMINATION:    CONSTITUTIONAL:  Vital signs as above.  No acute distress.  The patient is well nourished and well groomed.  PSYCHIATRIC:  The patient is awake, alert, oriented to person, place, time and answering questions appropriately with clear speech.    HEENT: Normocephalic, atraumatic.  Sclera clear.  Neck is supple.  SKIN:  Skin over the face, bilateral lower extremities, and posterior torso is clean, dry, intact without rashes.    GAIT:  Gait is non-antalgic.  The patient is able to heel and toe walk without significant difficulty.    STANDING EXAMINATION: Mild tenderness palpation right sacroiliac joint.  No tenderness palpation bilateral lower lumbar paraspinous muscles.  Lumbar range of motion is full.  MUSCLE STRENGTH:  The patient has 5/5 strength for the bilateral hip flexors, knee flexors/extensors, ankle dorsiflexors/plantar flexors, great toe extensors, ankle evertors/invertors.  NEUROLOGICAL: 1+ left and trace right patellar, and trace achilles reflexes bilaterally.  Negative Babinski's bilaterally.  No ankle clonus bilaterally. Sensation to light touch is intact in the bilateral L4, L5, and S1 dermatomes.  VASCULAR:  2/4 dorsalis pedis pulses bilaterally.  Bilateral lower extremities are warm.  There is no pitting edema of the bilateral lower extremities.  ABDOMINAL:  Soft, non-distended, non-tender throughout all quadrants.  No pulsatile mass palpated in the left lower quadrant.  LYMPH NODES:  No palpable or tender inguinal lymph nodes.  MUSCULOSKELETAL: Straight leg raise negative bilaterally.  Negative pelvic distraction test.  Negative thigh thrust bilaterally.  Negative Rocky's test bilaterally.  Negative Gaenslen's test bilaterally.  Positive Yeoman's test right, negative Yeoman's test left.    RESULTS: I reviewed the report of a CT abdomen and  pelvis from [facility] dated January 27, 2015.  This shows degenerative disc disease and degenerative facet arthritis lumbar spine.  Also shows degenerative changes both sacroiliac joints.

## 2021-06-17 NOTE — TELEPHONE ENCOUNTER
"PSP:  Shaye MIRZA  Last clinic visit:  4/28/2021 Telephone visit  Reason for call: Update  Clinical information:  Pt calling to report that the medications Shaye prescribed for him \"are working miracles. It's wonderful. Prior to these medications I was on the floor, now I'm back to jogging\". Pt reports he plans to continue to be active and take the medications as prescribed.   Pt inquires if he should cancel his MRI appointment for 5/17. Explained to pt that his appt on Monday is for physical therapy, not an MRI. Pt stated understanding. He reports he does not plan to do physical therapy at this time as he is feeling so much better and he is already so active.   Advice given to patient: Advised pt to follow-up with the [facility] as needed.   Provider to address: PARKER    "

## 2021-06-17 NOTE — TELEPHONE ENCOUNTER
[facility] sent fax for 90 day supply request: Tizanidine 2 mg    Pharmacy sent refill request for tizanidine   --Med last Rx 5/17/21 #30, 1 refill   --Last OV 4/28/21   --Future appt: none  --Please advise

## 2021-06-17 NOTE — TELEPHONE ENCOUNTER
"PSP:  Shaye Tyler PA-C  Last clinic visit: 4/28/21 telephone visit and 4/23 new patient appt  Reason for call: Medication refill request; states has 1 day left of Tizanidine and will need Gabapentin shortly.   Clinical information: Reports taking 1/2-1 Tizanidine 2 mg tablet twice a day. He is taking Gabapentin 300 mg three times a day. \"I have no pain now. I'm walking and back to weight lifting again. I am dead lifting 350 pounds right now. It's like a switch was flipped. I was on the floor before I saw her.\"   Advice given to patient: Discussed the medications as how to use them. Explained if he is not having pain and/or spasm he does not need to take the Tizanidine., The Gabapentin should be taken regularly  three times a day. Stated understanding.   Provider to address: Refills as request; Rx have been prepped for PSP review.     "

## 2021-06-17 NOTE — PROGRESS NOTES
Admission History & Physical  Steven Joyner, 1945, [id number]    [facility] [address]  Kashmir Jackson MD, [phone]    Extended Emergency Contact Information  Primary Emergency Contact: Lesley Joyner   [facility]  Home Phone: [phone]  Mobile Phone: [phone]  Relation: Spouse     Assessment and Plan:   Assessment: Hallux limitus left foot  Plan: I have recommended a first metatarsal phalangeal joint implant arthroplasty    Chief Complaint:  Left foot pain     HPI:    Steven Joyner is a 72 y.o. old male who presented to the clinic today complaining of pain involving his left foot.  The patient stated that the pain is an aching type pain located between the first and second toes of his left foot.  The patient stated that several years ago he had bunion surgery performed on both feet.  He stated that he does not know if this new pain is attributed to the surgery that he had previously.  The pain is mild to moderate.  It is aggravated with prolonged weightbearing.  The patient stated he can only walk 1 block without severe pain.  He denies any particular trauma to the left foot.  He has not had any associated redness or swelling.  History is provided by patient    Medical History  Active Ambulatory (Non-Hospital) Problems    Diagnosis     Bloating     BPH (benign prostatic hyperplasia)     Abnormal weight gain     Weight gain     Dietary counseling and surveillance     Hypercholesterolemia     Testicular hyperfunction     Obesity     Erythrocytosis     Rotator Cuff Tendonitis     Post-traumatic Stress Disorder     Hallux Valgus     Obstructive sleep apnea     Testicular hypofunction     Acquired Polycythemia     Male Erectile Disorder     Hypertension     Mild intermittent asthma     Dermatitis     Allergic Rhinitis     Cervicalgia     MAUDE (obstructive sleep apnea)     Hypogonadism, male     Abdominal pain     History reviewed. No pertinent past medical history.  Patient Active  Problem List    Diagnosis Date Noted     Bloating 02/09/2018     BPH (benign prostatic hyperplasia) 01/30/2018     Abnormal weight gain 10/30/2017     Weight gain 10/26/2017     Dietary counseling and surveillance 10/26/2017     Hypercholesterolemia 02/28/2017     Testicular hyperfunction 11/02/2016     Obesity 08/05/2015     Erythrocytosis 05/08/2015     Rotator Cuff Tendonitis      Post-traumatic Stress Disorder      Hallux Valgus      Obstructive sleep apnea      Testicular hypofunction      Acquired Polycythemia      Male Erectile Disorder      Hypertension      Mild intermittent asthma      Dermatitis      Allergic Rhinitis      Cervicalgia      MAUDE (obstructive sleep apnea) 11/11/2011     Hypogonadism, male 11/08/2011     Abdominal pain 02/20/2009     Surgical History  He  has a past surgical history that includes pr appendectomy and pr removal of tonsils,<13 y/o.   Past Surgical History:   Procedure Laterality Date     WA APPENDECTOMY      Description: Appendectomy;  Recorded: 09/11/2008;     WA REMOVAL OF TONSILS,<15 Y/O      Description: Tonsillectomy;  Recorded: 07/21/2011;    Social History  Reviewed, and he  reports that he has quit smoking. He has never used smokeless tobacco.  Social History   Substance Use Topics     Smoking status: Former Smoker     Smokeless tobacco: Never Used     Alcohol use Not on file      Allergies  Allergies   Allergen Reactions     Cat's Claw (Uncaria Tomentosa) Unknown    Family History  Reviewed, and family history is not on file.   Psychosocial Needs  Social History     Social History Narrative     Additional psychosocial needs reviewed per nursing assessment.       Prior to Admission Medications     (Not in a hospital admission)        Review of Systems - Negative     Pulse 78  Temp 98.7  F (37.1  C) (Temporal)   SpO2 98%    Objective findings: General: The patient is alert and in no acute distress      Integument: Nails bilateral feet normal length and color. Skin  bilateral feet warm supple and intact.  There are well-healed surgical incisions on the dorsal aspect of the first MPJ bilaterally.      Vascular: DP and PT pulses +2/4 bilateral feet.  Capillary refill less than 2 seconds bilateral feet.      Neurologic: Negative clonus, negative Babinski bilateral feet.       Musculoskeletal: Range of motion within normal limits bilateral feet.  Muscle power +5/5 bilaterally in all compartments.   There is mild pain on palpation of the first intermetatarsal space left foot.  There is no crepitus on range of motion of the first metatarsal phalangeal joint left foot.  There is no pain on palpation of the second metatarsal phalangeal joint left foot.  No palpable masses are noted.  There is no Tinel sign when percussing the deep peroneal nerve left foot.        Assessment: Hallux limitus left foot       Plan: An x-ray of the left foot was taken today.  I informed the patient that he has a severely arthritic first metatarsophalangeal joint of the left foot.  I am recommending a first metatarsal phalangeal joint implant arthroplasty of the left foot.  The patient was informed that this procedure would be done on an outpatient basis under local anesthesia with IV sedation.  He was told the procedure takes approximately 30 minutes to perform.  He would then be discharged and able to weight-bear in a postop shoe for 2 weeks.  The patient was asked to go n.p.o. at midnight prior to the procedure and he was asked to see his primary care physician for a preoperative consultation.

## 2021-06-17 NOTE — PROGRESS NOTES
"Steven Joyner is a 75 y.o. male who is being evaluated via a billable telephone visit during the COVID-19 crisis.      The patient has been notified of following:     \"This telephone visit will be conducted via a call between you and your physician/provider. We have found that certain health care needs can be provided without the need for a physical exam.  This service lets us provide the care you need with a short phone conversation.  If a prescription is necessary we can send it directly to your pharmacy.  If lab work is needed we can place an order for that and you can then stop by our lab to have the test done at a later time.    If during the course of the call the physician/provider feels a telephone visit is not appropriate, you will not be charged for this service.\"     Patient has given verbal consent to a Telephone visit? YES    Patient would like to receive their AVS by AVS Preference: [place].        Assessment:   Steven Joyner is a 75 y.o. y.o. male with past medical history significant for obstructive sleep apnea, polycythemia, hypertension, asthma, PTSD, obesity, hypercholesterolemia, BPH, hypogonadism who has a telephone visit today (video visits are not available) regarding acute right buttock pain and right anterior shin pain which began after lifting a garage door approximately April 1, 2021.  My review of an x-ray and right hip showed degenerative change bilateral sacroiliac joints, mild degenerative change bilateral hip joints, and degenerative changes lower lumbar spine.  When I evaluated the patient in April 23, 2021 I felt he was primarily symptomatic from sacroiliac joint dysfunction with secondary myofascial pain.  Within the differential is also lumbar radiculitis.  I am actually more concerned about that at this point since he continues to have the pain in the shin associated with the buttock pain.       Plan:     A shared decision making plan was used.  The patient's values and choices " were respected.  The following represents what was discussed and decided upon by the physician assistant and the patient.      1.  DIAGNOSTIC TESTS: I reviewed the x-ray pelvis and hip.  If symptoms persist, I would recommend an MRI lumbar spine and/or MRI pelvis.    2.  PHYSICAL THERAPY: I had entered a referral to physical therapy April 23, 2021.  Patient has not yet scheduled.  He is not sure what they will do to help him since he is already very active.  I stressed the importance of physical therapy and encouraged him to schedule soon as possible.  He was given the phone number.    3.  MEDICATIONS:    -Gabapentin 300 g was prescribed.  He is given the dosage titration chart.  He will increase his dose up to 300 mg 3 times daily to start.  We could potentially titrate his dose higher, depending on his response.  -Patient will continue tizanidine 2 mg 3 times daily as needed.  This has been helpful.  He did not tolerate 4 mg.  -Patient takes Celebrex 200 mg twice daily.  -Patient also has been applying Voltaren gel as needed which has been helpful.  -I recommended we avoid opiates.  Patient is in agreement.    4.  INTERVENTIONS: No interventions were ordered.  Patient is going to trial conservative treatments first.  Patient to potentially benefit from interventional pain management if symptoms persist.    5.  REFERRALS: Patient asked if he would be a candidate for medical marijuana.  I told the patient that he would need an evaluation with a chronic pain specialist to determine if he is a candidate and get certified.  He would like a referral to the pain clinic so this was entered for him today.    6.  PATIENT EDUCATION: Patient is a competitive weightlifter.  He states that the pain does not worsen while he is lifting.  I told the patient that even though it is not painful while he is doing it I recommended that he take a week off to rest and let his body heal.  He was in agreement with this.  -Patient has been  experiencing some intermittent pain bilateral elbows and left third finger.  He wonders if this is related to his back.  I told the patient I would not be related to his back.  We will monitor for now.    7.  FOLLOW-UP: Patient should follow-up with me in 1 week.  If there are any questions/concerns or any significant worsening of pain prior to that time, the patient is asked to call the clinic via the nurse navigation line or via [facility] [place].       Phone call duration: 18 minutes   (Start time: 1:03 PM, End time: 1:21 PM)    Subjective:     Steven Joyner is a 75 y.o. male who presents today for follow-up regarding acute right buttock pain and right lower extremity pain pain began after lifting a broken garage door.  He states he felt he was shocked in the buttock.  I saw the patient in consultation April 23, 2021.  At that time I ordered an x-ray pelvis and hips.  He returns today to review the results and discuss treatment options.    Patient reports that since he was last seen his pain has improved.  He is no longer having the severe pain that caused him to arrive around on the ground.  He continues have pain localizing to the right buttock.  He denies any pain in the thigh, but he experiences pain in the right anterior shin.  He describes the pain as an electric type pain.  He has some tingling in the same area as his pain.  He rates his pain today as a 3 out of 10.  At its worst it is an 8-10.  At its best it is a 0-10.  Pain is aggravated with getting up in the morning.  Pain is alleviated with lying down and applying cold and heat.  Patient notes that he has continued weight lifting including dead lifting 400 pounds, squatting 300 pounds, walking downstairs with 200 pounds.  He states that there is no increased pain while he is lifting.    Patient states he has noticed some pain in both elbows and left third finger.  This began after attempting heavier bench presses.  He wonders if this is related to his  back.    Patient has not had physical therapy.  I had referred him April 23.  He states he did not schedule because he is not sure what they can do for him since he already is so active.  He has been going to the chiropractor but does not feel he is making progress.  He takes Celebrex 200 mg daily.  He is taking tizanidine 2 mg as needed.  He did not tolerate 4 mg due to dizziness.  Tylenol has not been helpful.  He did start applying Voltaren gel which has been helpful.    Past medical history is reviewed.    Review of Systems:  Positive for tingling, headache, difficulty with hand skills.  Negative for weakness, loss of bowel/bladder control, inability to urinate, pain much worse at night, trip/stumble/falls, difficulty swallowing, fevers, unintentional weight loss.     Objective:     PSYCHIATRIC:  The patient is awake, alert, oriented to person, place and time.  The patient is answering questions appropriately with clear speech.  Normal affect.    The rest of the exam is deferred as this is a telephone visit.       RESULTS:  EXAM: XR PELVIS W 2 VW HIP RIGHT  LOCATION: [facility]  DATE/TIME: 4/26/2021 1:31 PM     INDICATION: Pain. Right SI joint pain  COMPARISON: None.     IMPRESSION:   Degenerative change of the bilateral SI joints. Mild degenerative change of the bilateral hips and mild joint space narrowing. Degenerative changes partially visualized lower lumbar spine. No fracture or dislocation.

## 2021-06-17 NOTE — TELEPHONE ENCOUNTER
He can try tizanidine 2-4 mg three times a day prn.  He can also use over the counter pain relieving patches with lidocaine. Recommend avoiding opiates. He needs to start PT.  Will await xray results.

## 2021-06-17 NOTE — PROGRESS NOTES
Patient ID: Steven Joyner is a 72 y.o. male.  /82  Pulse 86  Wt 222 lb (100.7 kg)  SpO2 96%  BMI 36.94 kg/m2    Assessment/Plan:                   Diagnoses and all orders for this visit:    Screen for colon cancer    HTN (hypertension)  -     Comprehensive Metabolic Panel    Weight gain  -     Celiac(Gluten)Antibody Panel  -     Glycosylated Hemoglobin A1c  -     Thyroid Cascade    Obstructive sleep apnea    Bloating  -     Comprehensive Metabolic Panel  -     Celiac(Gluten)Antibody Panel  -     Vitamin D, Total (25-Hydroxy)    Hypercholesterolemia  -     Comprehensive Metabolic Panel  -     Thyroid Cascade  -     Lipid Cascade RANDOM    Erythrocytosis  -     HM1(CBC and Differential)  -     HM1 (CBC with Diff)    Fatigue  -     Vitamin D, Total (25-Hydroxy)  -     Vitamin B12  -     Thyroid Cascade    Other orders  -     Cancel: Ambulatory referral for Colonoscopy  -     Cancel: losartan (COZAAR) 100 MG tablet; Take 1 tablet (100 mg total) by mouth daily.  Dispense: 90 tablet; Refill: 3  -     amLODIPine (NORVASC) 5 MG tablet; Take 1 tablet (5 mg total) by mouth daily.  Dispense: 90 tablet; Refill: 3      DISCUSSION  Obtain labs as above.  Change losartan to amlodipine.  Subjective:     HPI    Steven Joyner is a 72 y.o. male who is here today to discuss ongoing concerns with his weight and sleep apnea.  Patient feels that losartan likely contributes to fatigue and probable of erectile dysfunction.  He wishes to change to a different antihypertensive.  Reviewed history.  Had previously been on amlodipine.  Was thought to have contributed to erectile dysfunction in the past but willing to try again in place of losartan to see if this confers any benefit.  We reiterated considerations we have discussed at his last visit regarding weight gain.  His weight has not continue to go up however he feels he should have lost more weight than the 2 pounds we recorded currently.  We did extensive discussion  building off of our last visit.  Reiterated my points regarding what I feel contributes to weight gain and discuss this with him.  He has not visited with his dentist to discuss a mouthguard and we set this is a course of action for him to pursue.  We discussed reevaluation of labs within reason to investigate further.      Review of Systems  Complete review of systems is obtained.  Other than the specific considerations noted above complete review of systems is negative.          Objective:   Medications:  Current Outpatient Prescriptions   Medication Sig Note     BREO ELLIPTA 100-25 mcg/dose DsDv inhaler INHALE 1 PUFF DAILY 6/26/2017: Received from: External Pharmacy     CIALIS 20 mg tablet TAKE ONE-HALF TABLET BY MOUTH AS NEEDED      fluorouracil (EFUDEX) 5 % cream  4/27/2018: Received from: External Pharmacy     mometasone-formoterol (DULERA) 100-5 mcg/actuation HFAA inhaler Inhale 2 puffs 2 (two) times a day.      syringe, disposable, 3 mL Syrg Use As Directed. Use for testosterone injections      testosterone cypionate (DEPOTESTOTERONE CYPIONATE) 200 mg/mL injection INJECT 0.5MLS INTRAMUSCULARLY EVERY 2 WEEKS      triamcinolone (KENALOG) 0.1 % cream Apply topically 2 (two) times a day.      amLODIPine (NORVASC) 5 MG tablet Take 1 tablet (5 mg total) by mouth daily.      GEMFIBROZIL ORAL Take by mouth.        Allergies:  Allergies   Allergen Reactions     Cat's Claw (Uncaria Tomentosa) Unknown       Tobacco:   reports that he has quit smoking. He has never used smokeless tobacco.     Physical Exam          /82  Pulse 86  Wt 222 lb (100.7 kg)  SpO2 96%  BMI 36.94 kg/m2        General Appearance:    Alert, cooperative, no distress   Eyes:    No conjunctival irritation, no scleral icterus       Extremities:   Extremities normal, atraumatic, no cyanosis or edema   Skin:   Skin color, texture, turgor normal, no rashes or lesions   Neurologic:   Normal strength and sensation        Duration of visit  exceeded 25 minutes more than 50% of time is spent in counseling and coronation of care regarding the issues above.

## 2021-06-17 NOTE — TELEPHONE ENCOUNTER
----- Message from Shaye Tyler PA-C sent at 4/26/2021  4:11 PM CDT -----  Please call the patient let him know that I reviewed his x-ray.  This does show degenerative change in both sacroiliac joints.  There is also mild degenerative change in the hips and lower lumbar spine.

## 2021-06-17 NOTE — TELEPHONE ENCOUNTER
"PSP:  Shaye MIRZA  Last clinic visit:  4/23/2021 Consult  Reason for call: Medication recommendations  Clinical information:  Pt calling to give status update. Pt reports the injection that was given to him at his last appointment \"helped some. Maybe 50-75%. I'm not hobbling around but when I get up in the morning the pain is there. I have had a very athletic life and this is very painful. The pain has disabled me\".   Pt states he is using Celebrex 200 mg daily and Tylenol PRN. He has tried ibuprofen without relief. Pt inquires if there is something else from the pharmacy he could try or something that Shaye could prescribe for him. \"I've gotta try something else\".   Advice given to patient: Advised pt that provider would be updated. Pt has not yet completed his ordered x-rays. Pt will get these done as soon as he is able too. Pharmacy verified should prescription be provided.   Provider to address: Medication recommendations          "

## 2021-06-17 NOTE — TELEPHONE ENCOUNTER
"Pt returned call. Results and recommendations given. Pt stated understanding. Pt had several questions in regards to his treatment plan and medications. Pt is using Tylenol PRN, Celebrex 200 mg daily and Tizanidine 2mg 1 tablet PRN (he notes 2 tablets is \"too much\"). Offered appt with provider to discuss all of his questions. Pt agreeable. Transferred pt to scheduling to make appt.     "

## 2021-06-17 NOTE — TELEPHONE ENCOUNTER
Phone call to patient to inform that prescriptions were authorized and have been sent to their pharmacy. Stated understanding and appreciation for call back.

## 2021-06-17 NOTE — TELEPHONE ENCOUNTER
Pt left voicemail on nurse navigation line returning call. Call placed to pt. Message left on mobile # per his request and then also tried home # per his request. Left message to return call.

## 2021-06-17 NOTE — TELEPHONE ENCOUNTER
Call placed to pt with provider's response. Pt stated understanding. He would like to try the tizanidine. He will  OTC lidocaine patches as well.     Pt will schedule for PT. He is scheduled for his x-rays this afternoon.

## 2021-06-17 NOTE — PATIENT INSTRUCTIONS - HE
I recommend that you begin with PT.  You can call [phone] to schedule physical therapy.  It will be very important for you to do your physical therapy exercises on a regular basis to decrease your pain and prevent future flares of pain.

## 2021-06-18 ENCOUNTER — COMMUNICATION - HEALTHEAST (OUTPATIENT)
Dept: LAB | Facility: CLINIC | Age: 76
End: 2021-06-18

## 2021-06-18 DIAGNOSIS — N52.9 ERECTILE DYSFUNCTION, UNSPECIFIED ERECTILE DYSFUNCTION TYPE: ICD-10-CM

## 2021-06-18 NOTE — PROGRESS NOTES
"Assessment: Steven is here alone for his initial education for Diabetes.  Steven has been a weight  most of his life and wishes to continue this.  He has an injury to his left foot that requires arthroplasty.  His surgery was previously cancelled due to his elevated glucose.    Steven is not taking any medications for Diabetes at this time.  He is not checking blood sugars at home and does not feel this is needed as he stated his blood sugar will be \"back to normal in no time.    Stated since he has had trouble with his foot, he has had to cut his activity.  Currently he is active 5 days a week for about 1-2 hours doing only weight training.  We discussed the benefit cardio can have on glucose, getting the heart rate up to help burn glucose.  He will think about this.    Over 50% of our time was spent discussing meal planning.  Stated he is currently eating 2200 calories per day, would like to get back up to [id number] per day.  His portions are large, for breakfast today he had a bowl of grapenuts, 7 eggs whites and a bowl of oatmeal.  Stated he eats every 2-3 hours.  He uses a supplement that is high in maltodextrin.  Discussed how this could be adding to post work-out hyperglycemia and making blood sugars worse.    Plan: Sarkis will work on getting in more complex carbohydrates and portion sizes.  Discussed meal plan is for an average person, not someone like himself who requires more energy.  Asked him to follow-up in a month.  He did not schedule an appointment before leaving.    Subjective and Objective:      Steven Joyner is referred by Dr. Kashmir Jackson for Diabetes Education.     Lab Results   Component Value Date    HGBA1C 8.6 (H) 04/27/2018         Goals       HEMOGLOBIN A1C < 7.0            Goals for 6.19.18:  Nutrition-  Watch portion size of foods high in carbohydrates  Consider decreasing use of supplements high in simple carb              Follow up:   CDE (certified diabetic " educator)      Education:     Monitoring   Meter (per above goals): Assessed and Discussed  Monitoring: Not addressed  BG goals: Not addressed    Nutrition Management  Nutrition Management: Assessed and Discussed  Weight: Assessed and Discussed  Portions/Balance: Assessed, Discussed and Literature provided  Carb ID/Count: Assessed, Discussed and Literature provided  Label Reading: Assessed and Discussed  Heart Healthy Fats: Assessed, Discussed and Literature provided  Menu Planning: Assessed and Discussed  Dining Out: Assessed and Discussed  Physical Activity: Assessed and Discussed  Medications: Assessed and Discussed  Orals: Assessed  Injected Medications: Not addressed   Storage/Exp:Not addressed   Site Rotation: Not addressed   Sites Assessed: no    Diabetes Disease Process: Assessed and Discussed    Acute Complications: Prevent, Detect, Treat:  Hypoglycemia: Assessed and Discussed  Hyperglycemia: Assessed and Discussed  Sick Days: Not addressed  Driving: Not addressed    Chronic Complications  Foot Care:Not addressed  Skin Care: Not addressed  Eye: Not addressed  ABC: Not addressed  Teeth:Not addressed  Goal Setting and Problem Solving: Assessed and Discussed  Barriers: Assessed and Discussed  Psychosocial Adjustments: Assessed and Discussed      Time spent with the patient: 60 minutes for diabetes education and counseling.   Previous Education: no  Visit Type:DSMT  Hours Remaining: DSMT 9 and MNT 3      Mile Rodriguez  6/21/2018

## 2021-06-18 NOTE — PROGRESS NOTES
Preoperative Exam    Scheduled Procedure: FIRST METATARSAL PHALANGEAL JOINT IMPLANT ARTHROPLASTY LEFT FOOT  Surgery Date:  6/8/18  Surgery Location: [facility], fax [phone]    Surgeon:  Dr Sanders    Assessment/Plan:     Steven was seen today for pre-op exam.    Preoperative examination  -     Electrocardiogram Perform and Read  -     Basic Metabolic Panel  -     Hemoglobin    Hallux limitus    Type 2 diabetes mellitus  -     Ambulatory referral to Diabetes Education Program (New Diagnosis)    HTN (hypertension)    Obstructive sleep apnea    Hypogonadism in male    Mild intermittent asthma without complication    Acquired Polycythemia    Surgical Procedure Risk: Low (reported cardiac risk generally < 1%)  Have you had prior anesthesia?: Yes  Have you or any family members had a previous anesthesia reaction:  No  Do you or any family members have a history of a clotting or bleeding disorder?: No  Cardiac Risk Assessment: no increased risk for major cardiac complications    Patient approved for surgery with general or local anesthesia.    Please Note: Has sleep apnea and does not tolerate CPAP    Functional Status: Independent  Patient plans to recover at home with family.     Subjective:      Steven Joyner is a 73 y.o. male who presents for a preoperative consultation.      He has had chronic issues with pain in his left foot secondary to hallux limitus.  He needs surgical intervention.  He is a new diagnosis of diabetes with an A1c of 8.6.  He is made dietary changes which should help tremendously with management of blood sugars.  At this point we do not need to start medication and he will work diligently at diet improvement.  I feel based on his progress he will be suitable to proceed with surgery scheduled on June 8.  Other medical history includes obstructive sleep apnea, he does have chronic fatigue which is likely secondary to his untreated sleep apnea.  He is unable to tolerate any  treatment.  He has a history of asthma but no concerning symptoms at this time.  He is treated with testosterone for hypogonadism.  He has polycythemia which has been stable and evaluated extensively.  No significant concerns related to polycythemia.  Blood pressure controlled.  No concerns for coronary vascular disease.  No symptoms such as chest pain shortness of breath dizziness lightheadedness or syncope.    No acute illness.  Denies any signs or symptoms of infection.  Reports no problems with previous surgery or anesthesia.  Has had numerous procedures.    All other systems reviewed and are negative, other than those listed in the HPI.    Pertinent History  Do you have difficulty breathing or chest pain after walking up a flight of stairs: No  History of obstructive sleep apnea: Yes: DOES NOT TOLERATE CPAP  Steroid use in the last 6 months: No  Frequent Aspirin/NSAID use: No  Prior Blood Transfusion: No  Prior Blood Transfusion Reaction: No  If for some reason prior to, during or after the procedure, if it is medically indicated, would you be willing to have a blood transfusion?:  There is no transfusion refusal.    Current Outpatient Prescriptions   Medication Sig Dispense Refill     amLODIPine (NORVASC) 5 MG tablet Take 1 tablet (5 mg total) by mouth daily. 90 tablet 3     BREO ELLIPTA 100-25 mcg/dose DsDv inhaler INHALE 1 PUFF DAILY  6     CIALIS 20 mg tablet TAKE ONE-HALF TABLET BY MOUTH AS NEEDED 10 tablet 6     fluorouracil (EFUDEX) 5 % cream        GEMFIBROZIL ORAL Take by mouth.       losartan (COZAAR) 100 MG tablet TAKE 1 TABLET BY MOUTH EVERY DAY 90 tablet 3     mometasone-formoterol (DULERA) 100-5 mcg/actuation HFAA inhaler Inhale 2 puffs 2 (two) times a day. 1 Inhaler 3     syringe, disposable, 3 mL Syrg Use As Directed. Use for testosterone injections       testosterone cypionate (DEPOTESTOTERONE CYPIONATE) 200 mg/mL injection INJECT 0.5MLS INTRAMUSCULARLY EVERY 2 WEEKS 10 mL 1     triamcinolone  "(KENALOG) 0.1 % cream Apply topically 2 (two) times a day. 45 g 0     No current facility-administered medications for this visit.       Allergies   Allergen Reactions     Cat's Claw (Uncaria Tomentosa) Unknown       Patient Active Problem List   Diagnosis     Hallux Valgus     Obstructive sleep apnea     Testicular hypofunction     Acquired Polycythemia     Male Erectile Disorder     Hypertension     Mild intermittent asthma     Dermatitis     Allergic Rhinitis     Cervicalgia     Rotator Cuff Tendonitis     Post-traumatic Stress Disorder     Erythrocytosis     Obesity     Testicular hyperfunction     Hypercholesterolemia     BPH (benign prostatic hyperplasia)     Weight gain     Dietary counseling and surveillance     Bloating     Abnormal weight gain     Abdominal pain     MAUDE (obstructive sleep apnea)     Hypogonadism, male     No past medical history on file.    Past Surgical History:   Procedure Laterality Date     OR APPENDECTOMY      Description: Appendectomy;  Recorded: 09/11/2008;     OR REMOVAL OF TONSILS,<13 Y/O      Description: Tonsillectomy;  Recorded: 07/21/2011;     Social History     Social History     Marital status:      Spouse name: N/A     Number of children: N/A     Years of education: N/A     Occupational History     Not on file.     Social History Main Topics     Smoking status: Former Smoker     Smokeless tobacco: Never Used     Alcohol use Not on file     Drug use: Not on file     Sexual activity: Not on file     Other Topics Concern     Not on file     Social History Narrative     Patient Care Team:  Kashmir Jackson MD as PCP - General (Family Medicine)      Objective:     Vitals:    05/22/18 1436   BP: 138/80   Pulse: 87   SpO2: 96%   Weight: 222 lb (100.7 kg)   Height: 5' 5\" (1.651 m)       Physical Exam:  General Appearance:    Alert, cooperative, no distress, appears stated age   Eyes:   No scleral icterus or conjunctival irritation       Ears:    Normal TM's and external ear " canals, both ears   Throat:   Lips, mucosa, and tongue normal; teeth and gums normal   Neck:   Supple, symmetrical, trachea midline, no adenopathy;        thyroid:  No enlargement/tenderness/nodules   Lungs:     Clear to auscultation bilaterally, respirations unlabored, no wheezes or crackles   Heart:    Regular rate and rhythm,  no murmur, rub   or gallop   Abdomen:     Soft, non-tender, bowel sounds active,     no masses, no organomegaly   Extremities:   Extremities normal, atraumatic, no cyanosis or edema   Skin:   Skin color, texture, turgor normal, no rashes or lesions   Neurologic:   Normal strength and sensation        throughout on gross examination.     Recent Results (from the past 240 hour(s))   Hemoglobin   Result Value Ref Range    Hemoglobin 18.7 (H) 14.0 - 18.0 g/dL   Electrocardiogram Perform and Read   Result Value Ref Range    SYSTOLIC BLOOD PRESSURE  mmHg    DIASTOLIC BLOOD PRESSURE  mmHg    VENTRICULAR RATE 90 BPM    ATRIAL RATE 90 BPM    P-R INTERVAL 162 ms    QRS DURATION 102 ms    Q-T INTERVAL 366 ms    QTC CALCULATION (BEZET) 447 ms    P Axis 57 degrees    R AXIS -20 degrees    T AXIS 34 degrees    MUSE DIAGNOSIS       Normal sinus rhythm  Normal ECG  When compared with ECG of 25-OCT-2016 11:32,  Left anterior fascicular block is no longer Present  Nonspecific T wave abnormality no longer evident in Inferior leads       Immunization History   Administered Date(s) Administered     DT (pediatric) 05/15/2002     Influenza high dose, seasonal 11/17/2015, 03/14/2017, 02/09/2018     Influenza, inj, historic,unspecified 10/22/2014     Influenza, seasonal,quad inj 6-35 mos 12/08/2010, 10/15/2012, 09/12/2013     Influenza,seasonal, Inj IIV3 12/08/2010, 10/15/2012, 09/12/2013, 10/22/2014     Pneumo Conj 13-V (2010&after) 10/22/2014     Pneumo Polysac 23-V 12/08/2010, 07/23/2012     Td, Adult, Absorbed 05/15/2002, 07/26/2005     Td, adult adsorbed, PF 05/15/2002     Td,adult,historic,unspecified  05/15/2002     Tdap 08/31/2007, 10/15/2012     ZOSTER, LIVE 07/03/2012     Electronically signed by Kashmir Jackson MD 05/22/18 1:01 PM

## 2021-06-19 NOTE — PROGRESS NOTES
Progress Note    Reason for Visit:  Chief Complaint     testosterone          Progress Note:    HPI: This patient is here for follow-up because of hypogonadism.      Component      Latest Ref Rng & Units 5/22/2018 7/13/2018   Glucose      70 - 125 mg/dL 343 (H)    Calcium      8.5 - 10.5 mg/dL 9.4    BUN      8 - 28 mg/dL 20    Creatinine      0.70 - 1.30 mg/dL 1.22 1.25   GFR MDRD Af Amer      >60 mL/min/1.73m2 >60 >60   GFR MDRD Non Af Amer      >60 mL/min/1.73m2 59 (L) 57 (L)   PSA, Total      <=4.5 ng/mL     PSA, Free      ng/mL     Free PSA/PSA Ratio      ratio     Prostate Specific Antigen, Total      0.0 - 4.0 ng/mL  0.6   Prostate Specific Antigen, Free      ng/mL  0.1   Prostate Specific Antigen, Percent Free      %  17   Hemoglobin      14.0 - 18.0 g/dL 18.7 (H)    Testosterone      221 - 716 ng/dL  140 (L)   Hemoglobin A1c      3.5 - 6.0 %     Vitamin D, Total (25-Hydroxy)      30.0 - 80.0 ng/mL     TSH      0.30 - 5.00 uIU/mL         Review of Systems:    Nervous System: No headache, dizziness, fainting or memory loss. No tingling sensation of hand or feet.  Ears: No hearing loss or ringing in the ears  Eyes: No blurring of vision, redness, itching or dryness.  Nose: No nosebleed or loss of smell  Mouth: No mouth sores or loss of taste  Throat: No hoarseness or difficulty swallowing  Neck: No enlarged thyroid or lymph nodes.  Heart: No chest pain, palpitation or irregular heartbeat. No swelling of hands or feet  Lungs: No shortness of breath, cough, night sweats, wheezing or hemoptysis.  Gastrointestinal: No nausea or vomiting, constipation or diarrhea.  No acid reflux, abdominal pain or blood in stools.  Kidney/Bladdr: No polyuria, polydipsia, nocturia or hematuria.  Genital/Sexual: No loss of libido  Skin: No rash, hair loss or hirsutism.  No abnormal striae  Muscles/Joints/Bones: No morning stiffness, muscle aches and pain or loss of height.    Current Medications:  Current Outpatient Prescriptions  "  Medication Sig     amLODIPine (NORVASC) 5 MG tablet Take 1 tablet (5 mg total) by mouth daily.     BREO ELLIPTA 100-25 mcg/dose DsDv inhaler INHALE 1 PUFF DAILY     syringe, disposable, 3 mL Syrg Use As Directed. Use for testosterone injections     testosterone cypionate (DEPOTESTOTERONE CYPIONATE) 200 mg/mL injection INJECT 0.5MLS INTRAMUSCULARLY EVERY 2 WEEKS       Patients Active Problems:  Patient Active Problem List   Diagnosis     Hallux Valgus     Obstructive sleep apnea     Testicular hypofunction     Acquired Polycythemia     Male Erectile Disorder     Hypertension     Mild intermittent asthma     Dermatitis     Allergic Rhinitis     Cervicalgia     Rotator Cuff Tendonitis     Post-traumatic Stress Disorder     Erythrocytosis     Obesity     Testicular hyperfunction     Hypercholesterolemia     BPH (benign prostatic hyperplasia)     Weight gain     Dietary counseling and surveillance     Bloating     Abnormal weight gain     Abdominal pain     MAUDE (obstructive sleep apnea)     Hypogonadism, male       History:   reports that he has quit smoking. He has never used smokeless tobacco.   reports that he has quit smoking. He has never used smokeless tobacco. His alcohol and drug histories are not on file.  History   Smoking Status     Former Smoker   Smokeless Tobacco     Never Used      reports that he has quit smoking. He has never used smokeless tobacco. His alcohol and drug histories are not on file.  History   Sexual Activity     Sexual activity: Not on file     No past medical history on file.  No family history on file.  No past medical history on file.  Past Surgical History:   Procedure Laterality Date     IN APPENDECTOMY      Description: Appendectomy;  Recorded: 09/11/2008;     IN REMOVAL OF TONSILS,<13 Y/O      Description: Tonsillectomy;  Recorded: 07/21/2011;       Vitals   height is 5' 5\" (1.651 m) and weight is 212 lb (96.2 kg). His blood pressure is 130/72.         Exam  General appearance: " The patient looked well, not in acute distress.  Eyes: no evidence of thyroid eye disease.   Retinal exam: No evidence of diabetic retinopathy.  Mouth and Throat: Normal  Neck: No evidence of thyromegaly, enlarged lymph node or tenderness  Chest: Trachea is central. Chest is clear to auscultation and percussion. Breat sounds are normal.  Cardiovascular exam: JVP is not raised. Heart sounds are normal, no murmurs or rub  Peripheral pulses are palpable.   Abdomen: No masses or tenderness.    Back: No vertebral tenderness or kyphosis.  Extremities: No evidence of leg edema.   Skin: Normal to touch.  No abnormal striae  Neurologic exam:  Visual fields are intact by confrontation, grossly intact. No evidence of peripheral neuropathy.  Detailed foot exam normal.        Diagnosis:  No diagnosis found.    Orders:   No orders of the defined types were placed in this encounter.        Assessment and Plan: Hypogonadism the patient is currently on testosterone 100 mg IM every 2 weeks.    His testosterone is 140.    The patient is absolutely adamant to continue with that testosterone and we will continue at the same dose.    His hemoglobin is 18.7 I did advise him to take aspirin 81 mg enteric-coated.    He has hypertension on Norvasc 5 mg he was on losartan and hydrochlorothiazide and that was stopped his blood pressure is 130/72.  I will add losartan 25 mg creatinine is 1.25.    He has recently been diagnosed with type 2 diabetes his blood sugar 343 A1c is 8.6.    I would give him a glucometer and I advised him to check his blood sugar once or twice a week.  I will also start the patient on Amaryl 1 mg daily.    Patient return to clinic in 6 month.  Detailed foot exam is fine.    I have reviewed and ordered clinical lab test    I have reviewed and ordered radiology tests.    I have reviewed and ordered her medication as required.    I have reviewed her test results and advised with the performing physician.    I have reviewed  the patient's old records.    I have reviewed and summarized the patient old records.    I did spend 40 minutes with the patient more than 50% was spent on counseling and managing her care.

## 2021-06-20 NOTE — LETTER
Letter by Nae Huang CNP at      Author: Nae Huang CNP Service: -- Author Type: --    Filed:  Encounter Date: 8/26/2020 Status: (Other)                   Office Hours: Monday - Friday 8:00 - 4:30PM    Steven Joyner  [address] [place] [address]           August 26, 2020      Dear Steevn:    We received a referral from Nae Huang CNP for you to meet with a hematologist. Unfortunately we have been unable to reach you by phone. If you would like to schedule this please call [phone]         Sincerely,      [facility] [place] [facility]

## 2021-06-20 NOTE — LETTER
Letter by Nae Huang CNP at      Author: Nae Huang CNP Service: -- Author Type: --    Filed:  Encounter Date: 8/9/2020 Status: (Other)         Steven Joyner  [address] [place] [address]             August 9, 2020         Dear Mr. Joyner,    Below are the results from your recent visit:    Resulted Orders   HM2(CBC w/o Differential)   Result Value Ref Range    WBC 4.6 4.0 - 11.0 thou/uL    RBC 6.15 4.40 - 6.20 mill/uL    Hemoglobin 20.2 (HH) 14.0 - 18.0 g/dL    Hematocrit 61.2 (H) 40.0 - 54.0 %    MCV 99 80 - 100 fL    MCH 32.9 27.0 - 34.0 pg    MCHC 33.1 32.0 - 36.0 g/dL    RDW 12.3 11.0 - 14.5 %    Platelets 145 140 - 440 thou/uL    MPV 8.5 7.0 - 10.0 fL    Narrative    Verified x 2 Nae Huang CNP notified   Comprehensive Metabolic Panel   Result Value Ref Range    Sodium 143 136 - 145 mmol/L    Potassium 4.2 3.5 - 5.0 mmol/L    Chloride 106 98 - 107 mmol/L    CO2 21 (L) 22 - 31 mmol/L    Anion Gap, Calculation 16 5 - 18 mmol/L    Glucose 225 (H) 70 - 125 mg/dL    BUN 23 8 - 28 mg/dL    Creatinine 1.40 (H) 0.70 - 1.30 mg/dL    GFR MDRD Af Amer 60 (L) >60 mL/min/1.73m2    GFR MDRD Non Af Amer 50 (L) >60 mL/min/1.73m2    Bilirubin, Total 0.7 0.0 - 1.0 mg/dL    Calcium 9.1 8.5 - 10.5 mg/dL    Protein, Total 6.8 6.0 - 8.0 g/dL    Albumin 3.9 3.5 - 5.0 g/dL    Alkaline Phosphatase 91 45 - 120 U/L    AST 25 0 - 40 U/L    ALT 37 0 - 45 U/L    Narrative    Fasting Glucose reference range is 70-99 mg/dL per  American Diabetes Association (ADA) guidelines.   Vitamin B12   Result Value Ref Range    Vitamin B-12 572 213 - 816 pg/mL   Thyroid Cascade   Result Value Ref Range    TSH 1.52 0.30 - 5.00 uIU/mL   Erythrocyte Sedimentation Rate   Result Value Ref Range    Sed Rate 1 0 - 15 mm/hr   Lyme Antibody Cascade   Result Value Ref Range    Lyme Antibody Cascade 0.08 <0.90 Index Value    Narrative    Interpretation of Lyme Disease Total Antibody (IgG/IgM)  <0.90 Test Value=Negative  No detectable  antibodies to B. burgdorferi. Patients  in early stages of infection may not produce  detectable levels of antibody. Antibiotic therapy  in early disease may prevent antibody production  from reaching detectable levels. Patients with  clinical history and/or symptoms suggestive of Lyme  disease but with negative test results should be  retested in 2-4 weeks.  0.90-<1.10 Test Value=Borderline  Suggests the presence of antibodies to B.  burgdorferi. Recommend repeat collection in 2-4  weeks.  >=1.10 Test Value=Positive  Indicates the presence of antibodies to B.  burgdorferi. False positive results can occur with  sera from syphilis patients. Cross-reactivity may  occur with relapsing fever, [place] Spotted  fever, other spirochetal diseases, erythematosus,  EBV infection, or CMV infection. Clinical symptoms,  epidemiology of the case and other laboratory tests  should allow for distinction of these conditions from  Lyme disease.   Antinuclear Antibody (RACHEL) Cascade   Result Value Ref Range    RACHEL Screen Cascade 0.2 <=2.9 U    Narrative    <1.0 negative  1.1-2.9 weakly positive  3.0-5.9 positive ( reflex)  > or=6.0 strongly positive   Rheumatoid Factor Quant   Result Value Ref Range    Rheumatoid Factor Quantitative <15.0 0 - 30 IU/mL   Glycosylated Hemoglobin A1c   Result Value Ref Range    Hemoglobin A1c 6.8 (H) <=5.6 %      Comment:      Prediabetes:   HBA1c       5.7 to 6.4%        Diabetes:        HBA1c        >=6.5%   Patients with Hgb F >5%, total bilirubin >10.0 mg/dL, abnormal red cell turnover, severe renal or hepatic disease or malignancy should not have this A1C method used to diagnose or monitor diabetes.           Your blood counts are showing the elevated hemoglobin.  I recommend seeing hematology as we discussed.      Your creatinine (kidney function) is mildly elevated.  This can be due to dehydration, but it can also be a sign that your kidneys are working harder than they should.  I recommend  increasing your fluid intake.  I also recommend avoiding ibuprofen and naproxen.  Dr. Jackson will continue to monitor this.    Your hemoglobin A1c is controlled and stable.    The rest of your labs are normal.  I recommend seeing the sleep specialist as we discussed.    Please call with questions or contact us using [employer].    Sincerely,        Electronically signed by Nae Huang CNP

## 2021-06-23 NOTE — TELEPHONE ENCOUNTER
Pending appointment schedule for 04.18.2019.  Patient needs additional refills of Testosterone sent to the pharmacy asap     [facility] on file     Sarkis @ [phone]

## 2021-06-23 NOTE — TELEPHONE ENCOUNTER
Date: 4/18/2019 Status: [facility]   Time: 10:00 AM Length: 30   Visit Type: OFFICE VISIT [[id number]] Copay: $0.00   Provider: Dwayne Jauregui MD Department: [facility]   Referring Provider: LULU LEMONS CSN: [id number]   Notes: testosterone

## 2021-06-23 NOTE — TELEPHONE ENCOUNTER
Testosterone cypionate 200 mg/mL inject 100 mg IM every 14 days 1 mL vial with 5 refills called into the pt pharmacy.

## 2021-06-23 NOTE — TELEPHONE ENCOUNTER
Refill Approved    Rx renewed per Medication Renewal Policy. Medication was last renewed on 4/27/18.    Luca Velasco, [facility] Triage/Med Refill 2/9/2019     Requested Prescriptions   Pending Prescriptions Disp Refills     amLODIPine (NORVASC) 5 MG tablet [Pharmacy Med Name: AMLODIPINE BESYLATE 5 MG TAB] 90 tablet 3     Sig: TAKE 1 TABLET BY MOUTH EVERY DAY    Calcium-Channel Blockers Protocol Passed - 2/6/2019  5:02 PM       Passed - PCP or prescribing provider visit in past 12 months or next 3 months    Last office visit with prescriber/PCP: 4/27/2018 Kashmir Jackson MD OR same dept: 4/27/2018 Kashmir Jackson MD OR same specialty: 4/27/2018 Kashmir Jackson MD  Last physical: 5/22/2018 Last MTM visit: Visit date not found   Next visit within 3 mo: Visit date not found  Next physical within 3 mo: Visit date not found  Prescriber OR PCP: Kashmir Jackson MD  Last diagnosis associated with med order: There are no diagnoses linked to this encounter.  If protocol passes may refill for 12 months if within 3 months of last provider visit (or a total of 15 months).            Passed - Blood pressure filed in past 12 months    BP Readings from Last 1 Encounters:   07/16/18 130/72

## 2021-06-23 NOTE — TELEPHONE ENCOUNTER
He had an appointment with Dr. Jauregui yesterday, but it was canceled. Patient is requesting sooner appointment with Dr. Jauregui. He can only come in in the afternoon. Please advise on when to schedule.     Sarkis @ [phone]

## 2021-06-26 NOTE — TELEPHONE ENCOUNTER
RN cannot approve Refill Request    RN can NOT refill this medication med is not covered by policy/route to provider. Last office visit: 4/16/2021 Kashmir Jackson MD Last Physical: 5/22/2018 Last MTM visit: Visit date not found Last visit same specialty: Visit date not found.  Next visit within 3 mo: Visit date not found  Next physical within 3 mo: Visit date not found      Talia Lion, [facility] Connection Triage/Med Refill 6/18/2021    Requested Prescriptions   Pending Prescriptions Disp Refills     tadalafiL (CIALIS) 20 MG tablet [Pharmacy Med Name: TADALAFIL 20 MG TABLET] 4 tablet 5     Sig: TAKE 1 TABLET BY MOUTH EVERY DAY AS NEEDED       There is no refill protocol information for this order

## 2021-07-02 ENCOUNTER — COMMUNICATION - HEALTHEAST (OUTPATIENT)
Dept: FAMILY MEDICINE | Facility: CLINIC | Age: 76
End: 2021-07-02

## 2021-07-04 NOTE — TELEPHONE ENCOUNTER
Telephone Encounter by Julee Dickerson CMA at 7/2/2021  3:36 PM     Author: Julee Dickerson CMA Service: -- Author Type: Certified Medical Assistant    Filed: 7/2/2021  3:40 PM Encounter Date: 7/2/2021 Status: Signed    : Julee Dickerson CMA (Certified Medical Assistant)       Reason for Call:  Call back      Detailed comments: Pt is calling to relay a message back to Dr. Lunsford- pt was seen today by Dr. Lunsford and wanted to know the name of the provider who performed the injections. Pt states the doctor's name is Dr. Arndt from [facility] in [place]. Pt also states that Dr. Arndt will be in contact with Dr. Lunsford.    Phone Number Patient can be reached at:   Cell number on file:    Telephone Information:   Mobile [phone]       Best Time: NA    Can we leave a detailed message on this number?: No call back needed    Call taken on 7/2/2021 at 3:37 PM by Julee Dickerson

## 2021-07-05 NOTE — TELEPHONE ENCOUNTER
Telephone Encounter by Adonis Lunsford MD at 7/5/2021  8:43 AM     Author: Adonis Lunsford MD Service: -- Author Type: Physician    Filed: 7/5/2021  8:43 AM Encounter Date: 7/2/2021 Status: Signed    : Adonis Lunsford MD (Physician)       Noted.    Adonis Lunsford MD

## 2021-07-08 ENCOUNTER — COMMUNICATION - HEALTHEAST (OUTPATIENT)
Dept: LAB | Facility: CLINIC | Age: 76
End: 2021-07-08

## 2021-07-08 NOTE — TELEPHONE ENCOUNTER
Telephone Encounter by Sherri Cooper MLT at 7/8/2021  4:14 PM     Author: Sherri Cooper MLT Service: -- Author Type:     Filed: 7/8/2021  4:15 PM Encounter Date: 7/8/2021 Status: Signed    : Sherri Cooper MLT ()       Patient had hgb value  Of 21.3 which was verbally given to Dr Nae Huang @ [id number] by CARMELA Cooper MLT

## 2021-07-17 DIAGNOSIS — M54.16 RADICULOPATHY, LUMBAR REGION: ICD-10-CM

## 2021-07-19 RX ORDER — GABAPENTIN 300 MG/1
CAPSULE ORAL
Qty: 90 CAPSULE | Refills: 1 | Status: SHIPPED | OUTPATIENT
Start: 2021-07-19 | End: 2021-09-28

## 2021-07-22 ENCOUNTER — TRANSFERRED RECORDS (OUTPATIENT)
Dept: HEALTH INFORMATION MANAGEMENT | Facility: CLINIC | Age: 76
End: 2021-07-22

## 2021-08-29 ENCOUNTER — HEALTH MAINTENANCE LETTER (OUTPATIENT)
Age: 76
End: 2021-08-29

## 2021-09-27 DIAGNOSIS — M54.16 RADICULOPATHY, LUMBAR REGION: ICD-10-CM

## 2021-09-28 RX ORDER — GABAPENTIN 300 MG/1
CAPSULE ORAL
Qty: 90 CAPSULE | Refills: 3 | Status: SHIPPED | OUTPATIENT
Start: 2021-09-28 | End: 2024-09-23

## 2021-09-28 NOTE — TELEPHONE ENCOUNTER
Last appointment: 04/28/2021  Next appointment: None    Notes/Comments:      Rx request(s) has been reviewed.

## 2021-10-24 ENCOUNTER — HEALTH MAINTENANCE LETTER (OUTPATIENT)
Age: 76
End: 2021-10-24

## 2021-11-16 ENCOUNTER — IMMUNIZATION (OUTPATIENT)
Dept: NURSING | Facility: CLINIC | Age: 76
End: 2021-11-16
Payer: COMMERCIAL

## 2021-11-16 PROCEDURE — 91300 PR COVID VAC PFIZER DIL RECON 30 MCG/0.3 ML IM: CPT

## 2021-11-16 PROCEDURE — 0004A PR COVID VAC PFIZER DIL RECON 30 MCG/0.3 ML IM: CPT

## 2021-12-16 ENCOUNTER — NURSE TRIAGE (OUTPATIENT)
Dept: NURSING | Facility: CLINIC | Age: 76
End: 2021-12-16
Payer: COMMERCIAL

## 2021-12-16 NOTE — TELEPHONE ENCOUNTER
"Pt reports he fell on ice three days ago. Pt reports \"broke ribs, hurting more\". Pt sounds slightly wheezy, states \"I need pain medication and to have the ribs wrapped\".     Writer advised pt to be seen today in ER or WIC now, have another adult drive.     Pt declines ER or  visit, wants a clinic appointment for tomorrow.     Reason for Disposition    Sounds like a serious injury to the triager    Protocols used: CHEST INJURY-A-OH      "

## 2021-12-31 DIAGNOSIS — N52.9 MALE ERECTILE DYSFUNCTION, UNSPECIFIED: ICD-10-CM

## 2022-01-03 RX ORDER — TADALAFIL 20 MG/1
TABLET ORAL
Qty: 4 TABLET | Refills: 5 | Status: SHIPPED | OUTPATIENT
Start: 2022-01-03 | End: 2022-07-12

## 2022-01-03 NOTE — TELEPHONE ENCOUNTER
"  Outpatient Medication Detail     Disp Refills Start End PORSCHE   tadalafiL (CIALIS) 20 MG tablet 4 tablet 5 6/21/2021  No   Sig: TAKE 1 TABLET BY MOUTH EVERY DAY AS NEEDED   Sent to pharmacy as: tadalafiL 20 mg tablet (CIALIS)   Notes to Pharmacy: DX Code Needed  PER PATIENT REQUEST, THANKS..   E-Prescribing Status: Receipt confirmed by pharmacy (6/21/2021  7:57 PM CDT)       Last office visit provider:  7/8/21     Requested Prescriptions   Pending Prescriptions Disp Refills     tadalafil (CIALIS) 20 MG tablet [Pharmacy Med Name: TADALAFIL 20 MG TABLET] 4 tablet 5     Sig: TAKE 1 TABLET BY MOUTH EVERY DAY AS NEEDED       Erectile Dysfuction Protocol Failed - 12/31/2021  2:22 PM        Failed - Medication is active on med list        Passed - Absence of nitrates on medication list        Passed - Absence of Alpha Blockers on Med list        Passed - Recent (12 mo) or future (30 days) visit within the authorizing provider's specialty     Patient has had an office visit with the authorizing provider or a provider within the authorizing providers department within the previous 12 mos or has a future within next 30 days. See \"Patient Info\" tab in inbasket, or \"Choose Columns\" in Meds & Orders section of the refill encounter.              Passed - Patient is age 18 or older             Dmitry Carrizales RN 01/03/22 3:16 PM  "

## 2022-03-16 PROBLEM — L25.9 CONTACT DERMATITIS AND OTHER ECZEMA, DUE TO UNSPECIFIED CAUSE: Status: ACTIVE | Noted: 2022-03-16

## 2022-03-16 PROBLEM — E66.01 MORBID OBESITY (H): Status: ACTIVE | Noted: 2021-07-08

## 2022-03-16 PROBLEM — M71.9 DISORDER OF BURSAE AND TENDONS IN SHOULDER REGION: Status: ACTIVE | Noted: 2019-03-18

## 2022-03-16 PROBLEM — D75.1 ACQUIRED POLYCYTHEMIA: Status: ACTIVE | Noted: 2019-03-18

## 2022-03-16 PROBLEM — M67.919 DISORDER OF BURSAE AND TENDONS IN SHOULDER REGION: Status: ACTIVE | Noted: 2019-03-18

## 2022-03-16 PROBLEM — R63.5 ABNORMAL WEIGHT GAIN: Status: ACTIVE | Noted: 2017-10-26

## 2022-03-16 PROBLEM — F43.10 POST-TRAUMATIC STRESS DISORDER: Status: ACTIVE | Noted: 2019-03-18

## 2022-03-16 PROBLEM — J45.20 MILD INTERMITTENT ASTHMA: Status: ACTIVE | Noted: 2019-03-18

## 2022-03-16 PROBLEM — M20.5X2 HALLUX LIMITUS OF LEFT FOOT: Status: ACTIVE | Noted: 2018-10-11

## 2022-03-16 PROBLEM — E11.9 DIABETES MELLITUS, TYPE 2 (H): Status: ACTIVE | Noted: 2021-07-08

## 2022-03-16 PROBLEM — M20.10 HALLUX VALGUS: Status: ACTIVE | Noted: 2022-03-16

## 2022-03-16 PROBLEM — F52.8 PSYCHOSEXUAL DYSFUNCTION WITH INHIBITED SEXUAL EXCITEMENT: Status: ACTIVE | Noted: 2019-03-18

## 2022-03-16 PROBLEM — N40.0 BPH (BENIGN PROSTATIC HYPERPLASIA): Status: ACTIVE | Noted: 2018-01-30

## 2022-03-16 PROBLEM — R14.0 BLOATING: Status: ACTIVE | Noted: 2018-02-09

## 2022-03-16 PROBLEM — J30.9 ALLERGIC RHINITIS: Status: ACTIVE | Noted: 2022-03-16

## 2022-03-16 PROBLEM — Z71.3 DIETARY COUNSELING AND SURVEILLANCE: Status: ACTIVE | Noted: 2017-10-26

## 2022-03-16 PROBLEM — E78.00 HYPERCHOLESTEROLEMIA: Status: ACTIVE | Noted: 2017-02-28

## 2022-03-16 PROBLEM — M54.2 CERVICALGIA: Status: ACTIVE | Noted: 2019-03-18

## 2022-04-14 RX ORDER — AMLODIPINE BESYLATE 5 MG/1
5 TABLET ORAL DAILY
COMMUNITY
Start: 2021-11-11

## 2022-04-14 RX ORDER — GEMFIBROZIL 600 MG/1
600 TABLET, FILM COATED ORAL DAILY
COMMUNITY
Start: 2021-11-11

## 2022-04-14 RX ORDER — GLIMEPIRIDE 1 MG/1
1 TABLET ORAL DAILY
COMMUNITY
Start: 2021-11-11 | End: 2024-09-23

## 2022-04-14 RX ORDER — CELECOXIB 200 MG/1
CAPSULE ORAL
COMMUNITY
Start: 2021-04-11 | End: 2024-09-23

## 2022-04-15 ENCOUNTER — OFFICE VISIT (OUTPATIENT)
Dept: FAMILY MEDICINE | Facility: CLINIC | Age: 77
End: 2022-04-15
Payer: COMMERCIAL

## 2022-04-15 VITALS
HEART RATE: 87 BPM | WEIGHT: 218 LBS | DIASTOLIC BLOOD PRESSURE: 84 MMHG | OXYGEN SATURATION: 97 % | HEIGHT: 65 IN | SYSTOLIC BLOOD PRESSURE: 136 MMHG | BODY MASS INDEX: 36.32 KG/M2

## 2022-04-15 DIAGNOSIS — M25.512 CHRONIC PAIN OF BOTH SHOULDERS: Primary | ICD-10-CM

## 2022-04-15 DIAGNOSIS — M25.511 CHRONIC PAIN OF BOTH SHOULDERS: Primary | ICD-10-CM

## 2022-04-15 DIAGNOSIS — G89.29 CHRONIC PAIN OF BOTH SHOULDERS: Primary | ICD-10-CM

## 2022-04-15 DIAGNOSIS — Z23 HIGH PRIORITY FOR 2019-NCOV VACCINE: ICD-10-CM

## 2022-04-15 PROCEDURE — 99213 OFFICE O/P EST LOW 20 MIN: CPT | Performed by: FAMILY MEDICINE

## 2022-04-15 PROCEDURE — 91305 COVID-19,PF,PFIZER (12+ YRS): CPT | Performed by: FAMILY MEDICINE

## 2022-04-15 PROCEDURE — 0054A COVID-19,PF,PFIZER (12+ YRS): CPT | Performed by: FAMILY MEDICINE

## 2022-04-15 ASSESSMENT — PATIENT HEALTH QUESTIONNAIRE - PHQ9
SUM OF ALL RESPONSES TO PHQ QUESTIONS 1-9: 1
10. IF YOU CHECKED OFF ANY PROBLEMS, HOW DIFFICULT HAVE THESE PROBLEMS MADE IT FOR YOU TO DO YOUR WORK, TAKE CARE OF THINGS AT HOME, OR GET ALONG WITH OTHER PEOPLE: NOT DIFFICULT AT ALL
SUM OF ALL RESPONSES TO PHQ QUESTIONS 1-9: 1

## 2022-04-16 ASSESSMENT — PATIENT HEALTH QUESTIONNAIRE - PHQ9: SUM OF ALL RESPONSES TO PHQ QUESTIONS 1-9: 1

## 2022-04-18 NOTE — PROGRESS NOTES
"Steven Joyner  /84   Pulse 87   Ht 1.651 m (5' 5\")   Wt 98.9 kg (218 lb)   SpO2 97%   BMI 36.28 kg/m       Assessment/Plan:                Steven was seen today for imm/inj and shoulder pain.    Diagnoses and all orders for this visit:    Chronic pain of both shoulders    High priority for 2019-nCoV vaccine  -     COVID-19,PF,PFIZER (12+ Yrs GRAY LABEL)         DISCUSSION  See discussion below.  Subjective:     HPI:    Steven Joyner is a 76 year old male with a complex medical history was to discuss management of shoulder pain only today.  He also wished to receive a COVID-vaccine booster dose.    He has chronic significant shoulder pain that bothers him with power weightlifting.  Has seen orthopedic specialty provider.    Today we discussed pain management from the standpoint of over-the-counter analgesic medication.  Discouraged regular use of NSAIDs.  Discussed potential benefits of acetaminophen.  Discussed topical pain relieving medications.  Reviewed side effects and other considerations.  Discussed follow-up with orthopedic specialty provider.    ROS:  Complete review of systems is obtained.  Other than the specific considerations noted above complete review of systems is negative.          Objective:   Medications:  Current Outpatient Medications   Medication     amLODIPine (NORVASC) 5 MG tablet     buPROPion (WELLBUTRIN XL) 300 MG 24 hr tablet     celecoxib (CELEBREX) 200 MG capsule     gabapentin (NEURONTIN) 300 MG capsule     gemfibrozil (LOPID) 600 MG tablet     glimepiride (AMARYL) 1 MG tablet     losartan (COZAAR) 100 MG tablet     Mometasone Furo-Formoterol Fum (DULERA IN)     Needle, Disp, 20G X 1\" MISC     Needle, Disp, 23G X 1\" MISC     Syringe, Disposable, 1 ML MISC     tadalafil (CIALIS) 20 MG tablet     testosterone enanthate (DELATESTRYL) 200 MG/ML injection     No current facility-administered medications for this visit.        Allergies:  Allergies   Allergen Reactions     Dog " Hair [Dogs]         Social History     Socioeconomic History     Marital status:      Spouse name: Not on file     Number of children: Not on file     Years of education: Not on file     Highest education level: Not on file   Occupational History     Not on file   Tobacco Use     Smoking status: Former Smoker     Years: 5.00     Types: Cigars     Start date: 2000     Quit date: 2003     Years since quittin.6     Smokeless tobacco: Never Used   Substance and Sexual Activity     Alcohol use: Not on file     Drug use: Not on file     Sexual activity: Not on file   Other Topics Concern     Parent/sibling w/ CABG, MI or angioplasty before 65F 55M? Not Asked   Social History Narrative     Not on file     Social Determinants of Health     Financial Resource Strain: Not on file   Food Insecurity: Not on file   Transportation Needs: Not on file   Physical Activity: Not on file   Stress: Not on file   Social Connections: Not on file   Intimate Partner Violence: Not on file   Housing Stability: Not on file       Family History   Problem Relation Age of Onset     Psychotic Disorder Sister         vulnerable adult     Cerebrovascular Disease Mother         Most Recent Immunizations   Administered Date(s) Administered     COVID-19,PF,Pfizer (12+ Yrs) 2021     COVID-19,PF,Pfizer 12+ Yrs ( and After) 04/15/2022     FLUAD(HD)65+ QUAD 2021     Flu, Unspecified 10/22/2014     Influenza (High Dose) 3 valent vaccine 2018     Influenza (IIV3) PF 10/22/2014     Pneumo Conj 13-V (&after) 10/22/2014     Pneumococcal 23 valent 2012     TD (ADULT, 7+) 05/15/2002     Td (Adult), Adsorbed 2005     Tdap (Adacel,Boostrix) 2018     Zoster vaccine, live 2012        Wt Readings from Last 3 Encounters:   04/15/22 98.9 kg (218 lb)   21 100.6 kg (221 lb 12.8 oz)   21 97.5 kg (215 lb)        BP Readings from Last 6 Encounters:   04/15/22 136/84   21 (!) 150/88  "  04/16/21 (!) 144/84   08/06/20 138/86   03/17/16 151/89   04/11/12 128/81        Hemoglobin A1C   Date Value Ref Range Status   04/16/2021 7.0 (H) <=5.6 % Final   08/06/2020 6.8 (H) <=5.6 % Final     Comment:     Prediabetes:   HBA1c       5.7 to 6.4%        Diabetes:        HBA1c        >=6.5%   Patients with Hgb F >5%, total bilirubin >10.0 mg/dL, abnormal red cell turnover, severe renal or hepatic disease or malignancy should not have this A1C method used to diagnose or monitor diabetes.         10/23/2019 6.7 (H) 3.5 - 6.0 % Final              PHYSICAL EXAM:    /84   Pulse 87   Ht 1.651 m (5' 5\")   Wt 98.9 kg (218 lb)   SpO2 97%   BMI 36.28 kg/m           General Appearance:    Alert, cooperative, no distress     "

## 2022-07-11 DIAGNOSIS — N52.9 MALE ERECTILE DYSFUNCTION, UNSPECIFIED: ICD-10-CM

## 2022-07-12 RX ORDER — TADALAFIL 20 MG/1
TABLET ORAL
Qty: 4 TABLET | Refills: 5 | Status: SHIPPED | OUTPATIENT
Start: 2022-07-12 | End: 2022-08-17

## 2022-07-12 NOTE — TELEPHONE ENCOUNTER
"Last Written Prescription Date:  1/3/2022  Last Fill Quantity: 4,  # refills: 5   Last office visit provider:  4/15/2022     Requested Prescriptions   Pending Prescriptions Disp Refills     tadalafil (CIALIS) 20 MG tablet [Pharmacy Med Name: TADALAFIL 20 MG TABLET] 4 tablet 5     Sig: TAKE 1 TABLET BY MOUTH EVERY DAY AS NEEDED       Erectile Dysfuction Protocol Passed - 7/11/2022  4:40 PM        Passed - Absence of nitrates on medication list        Passed - Absence of Alpha Blockers on Med list        Passed - Recent (12 mo) or future (30 days) visit within the authorizing provider's specialty     Patient has had an office visit with the authorizing provider or a provider within the authorizing providers department within the previous 12 mos or has a future within next 30 days. See \"Patient Info\" tab in inbasket, or \"Choose Columns\" in Meds & Orders section of the refill encounter.              Passed - Medication is active on med list        Passed - Patient is age 18 or older             Kandace Hancock RN 07/12/22 3:35 PM  "

## 2022-08-12 DIAGNOSIS — N52.9 MALE ERECTILE DYSFUNCTION, UNSPECIFIED: ICD-10-CM

## 2022-08-13 RX ORDER — TADALAFIL 20 MG/1
TABLET ORAL
Qty: 4 TABLET | Refills: 5 | OUTPATIENT
Start: 2022-08-13

## 2022-08-16 DIAGNOSIS — N52.9 MALE ERECTILE DYSFUNCTION, UNSPECIFIED: ICD-10-CM

## 2022-08-17 RX ORDER — TADALAFIL 20 MG/1
TABLET ORAL
Qty: 4 TABLET | Refills: 5 | OUTPATIENT
Start: 2022-08-17

## 2022-08-17 NOTE — TELEPHONE ENCOUNTER
Pt is calling wanting an update on refill request. Informed him of message below and patient states they do not have refills on file. Please send rx to pharmacy

## 2022-08-18 RX ORDER — TADALAFIL 20 MG/1
TABLET ORAL
Qty: 4 TABLET | Refills: 5 | Status: SHIPPED | OUTPATIENT
Start: 2022-08-18 | End: 2022-08-19

## 2022-08-19 RX ORDER — TADALAFIL 20 MG/1
TABLET ORAL
Qty: 4 TABLET | Refills: 5 | Status: SHIPPED | OUTPATIENT
Start: 2022-08-19 | End: 2022-10-19

## 2022-08-19 NOTE — TELEPHONE ENCOUNTER
Pharmacy calling requesting new rx as pt has used all refills from the 7/22 rx. Please sign rx, thanks!

## 2022-10-16 ENCOUNTER — HEALTH MAINTENANCE LETTER (OUTPATIENT)
Age: 77
End: 2022-10-16

## 2022-10-17 RX ORDER — AMLODIPINE BESYLATE 5 MG/1
1 TABLET ORAL DAILY
COMMUNITY
Start: 2022-06-25 | End: 2022-10-18

## 2022-10-17 RX ORDER — EMPAGLIFLOZIN 25 MG/1
25 TABLET, FILM COATED ORAL DAILY
COMMUNITY
Start: 2022-05-23

## 2022-10-17 RX ORDER — TESTOSTERONE CYPIONATE 200 MG/ML
INJECTION, SOLUTION INTRAMUSCULAR
COMMUNITY
Start: 2022-06-10 | End: 2024-09-23

## 2022-10-17 RX ORDER — LOSARTAN POTASSIUM 25 MG/1
25 TABLET ORAL DAILY
COMMUNITY
Start: 2022-07-25 | End: 2024-09-23

## 2022-10-18 ENCOUNTER — OFFICE VISIT (OUTPATIENT)
Dept: FAMILY MEDICINE | Facility: CLINIC | Age: 77
End: 2022-10-18
Payer: COMMERCIAL

## 2022-10-18 VITALS
BODY MASS INDEX: 35.49 KG/M2 | HEIGHT: 65 IN | HEART RATE: 90 BPM | WEIGHT: 213 LBS | OXYGEN SATURATION: 98 % | SYSTOLIC BLOOD PRESSURE: 144 MMHG | DIASTOLIC BLOOD PRESSURE: 84 MMHG

## 2022-10-18 DIAGNOSIS — E11.42 TYPE 2 DIABETES MELLITUS WITH DIABETIC POLYNEUROPATHY, WITHOUT LONG-TERM CURRENT USE OF INSULIN (H): Primary | ICD-10-CM

## 2022-10-18 DIAGNOSIS — D75.1 ACQUIRED POLYCYTHEMIA: ICD-10-CM

## 2022-10-18 DIAGNOSIS — I10 ESSENTIAL HYPERTENSION: ICD-10-CM

## 2022-10-18 DIAGNOSIS — R53.83 OTHER FATIGUE: ICD-10-CM

## 2022-10-18 DIAGNOSIS — E29.1 HYPOGONADISM, MALE: ICD-10-CM

## 2022-10-18 DIAGNOSIS — G47.33 OSA (OBSTRUCTIVE SLEEP APNEA): ICD-10-CM

## 2022-10-18 DIAGNOSIS — N52.9 ERECTILE DYSFUNCTION, UNSPECIFIED ERECTILE DYSFUNCTION TYPE: ICD-10-CM

## 2022-10-18 LAB
ALBUMIN SERPL BCG-MCNC: 4.1 G/DL (ref 3.5–5.2)
ALP SERPL-CCNC: 115 U/L (ref 40–129)
ALT SERPL W P-5'-P-CCNC: 30 U/L (ref 10–50)
ANION GAP SERPL CALCULATED.3IONS-SCNC: 12 MMOL/L (ref 7–15)
AST SERPL W P-5'-P-CCNC: 27 U/L (ref 10–50)
BILIRUB SERPL-MCNC: 1.1 MG/DL
BUN SERPL-MCNC: 17.4 MG/DL (ref 8–23)
CALCIUM SERPL-MCNC: 9.1 MG/DL (ref 8.8–10.2)
CHLORIDE SERPL-SCNC: 104 MMOL/L (ref 98–107)
CREAT SERPL-MCNC: 1.09 MG/DL (ref 0.67–1.17)
DEPRECATED HCO3 PLAS-SCNC: 24 MMOL/L (ref 22–29)
ERYTHROCYTE [DISTWIDTH] IN BLOOD BY AUTOMATED COUNT: 15.4 % (ref 10–15)
GFR SERPL CREATININE-BSD FRML MDRD: 71 ML/MIN/1.73M2
GLUCOSE SERPL-MCNC: 253 MG/DL (ref 70–99)
HBA1C MFR BLD: 7.6 % (ref 0–5.6)
HCT VFR BLD AUTO: 58.4 % (ref 40–53)
HGB BLD-MCNC: 19.9 G/DL (ref 13.3–17.7)
MCH RBC QN AUTO: 31.8 PG (ref 26.5–33)
MCHC RBC AUTO-ENTMCNC: 34.1 G/DL (ref 31.5–36.5)
MCV RBC AUTO: 93 FL (ref 78–100)
PLATELET # BLD AUTO: 166 10E3/UL (ref 150–450)
POTASSIUM SERPL-SCNC: 4.2 MMOL/L (ref 3.4–5.3)
PROT SERPL-MCNC: 6.9 G/DL (ref 6.4–8.3)
RBC # BLD AUTO: 6.26 10E6/UL (ref 4.4–5.9)
SODIUM SERPL-SCNC: 140 MMOL/L (ref 136–145)
WBC # BLD AUTO: 5.4 10E3/UL (ref 4–11)

## 2022-10-18 PROCEDURE — 83036 HEMOGLOBIN GLYCOSYLATED A1C: CPT | Performed by: FAMILY MEDICINE

## 2022-10-18 PROCEDURE — G0008 ADMIN INFLUENZA VIRUS VAC: HCPCS | Performed by: FAMILY MEDICINE

## 2022-10-18 PROCEDURE — 80053 COMPREHEN METABOLIC PANEL: CPT | Performed by: FAMILY MEDICINE

## 2022-10-18 PROCEDURE — 90662 IIV NO PRSV INCREASED AG IM: CPT | Performed by: FAMILY MEDICINE

## 2022-10-18 PROCEDURE — 85027 COMPLETE CBC AUTOMATED: CPT | Performed by: FAMILY MEDICINE

## 2022-10-18 PROCEDURE — 82043 UR ALBUMIN QUANTITATIVE: CPT | Performed by: FAMILY MEDICINE

## 2022-10-18 PROCEDURE — 99214 OFFICE O/P EST MOD 30 MIN: CPT | Mod: 25 | Performed by: FAMILY MEDICINE

## 2022-10-18 PROCEDURE — 36415 COLL VENOUS BLD VENIPUNCTURE: CPT | Performed by: FAMILY MEDICINE

## 2022-10-18 RX ORDER — TADALAFIL 20 MG/1
TABLET ORAL
Qty: 4 TABLET | Refills: 5 | Status: CANCELLED | OUTPATIENT
Start: 2022-10-18

## 2022-10-18 NOTE — PROGRESS NOTES
"Steven Joyner  BP (!) 144/84   Pulse 90   Ht 1.651 m (5' 5\")   Wt 96.6 kg (213 lb)   SpO2 98%   BMI 35.45 kg/m       Assessment/Plan:                Steven was seen today for musculoskeletal problem, dizziness, insomnia and erectile dysfunction.    Diagnoses and all orders for this visit:    Type 2 diabetes mellitus with diabetic polyneuropathy, without long-term current use of insulin (H)  -     Comprehensive metabolic panel (BMP + Alb, Alk Phos, ALT, AST, Total. Bili, TP)  -     Hemoglobin A1c  -     Albumin Random Urine Quantitative with Creat Ratio; Future  -     Albumin Random Urine Quantitative with Creat Ratio    Male erectile dysfunction, unspecified    Fatigue  -     CBC with platelets    Essential hypertension    MAUDE (obstructive sleep apnea)    Hypogonadism, male    Acquired polycythemia    Other orders  -     INFLUENZA, QUAD, HD, PF, 65+ (FLUZONE HD)         DISCUSSION  He has diabetic neuropathy.  Would recommend titrating dose of gabapentin back upward to achieve better pain relief.  We spent some time discussing this.  Do not feel additional lab testing is warranted at this time if this continues to worsen may need to consider further evaluation and/or other treatment options.    Will check additional labs as noted.  Counseled regarding dietary change for further management of diabetes also encouraged him to follow-up with his endocrinologist as recommended.  Encouraged him to begin checking blood sugars and discussed the benefits of doing so at least on occasion.    Monitor blood pressure.  No change in treatment for now.    Flu shot given today.  Subjective:     HPI:    Steven Joyner is a 77 year old male is here today to discuss several concerns.  He has type 2 diabetes he is on glimepiride and Jardiance.  He sees an endocrinologist who also manages hypogonadism for which he takes testosterone.    He states he is not really checking blood sugars.  He is trying to follow a diet low in sugar " but does have some sources of sugar in his diet that he feels he could eliminate.  Denies hypoglycemia.  Has neuropathy in his feet that he describes as a sensation like there is paper stuck to the bottom of his feet and it is uncomfortable.  He has a prior history of a sciatic nerve pain problem for which she took gabapentin and still takes once daily.  He does not have sciatic nerve pain.  He has had thyroid and vitamin B12 checked within the past couple of years neither have been problems for him.  Discussed with him extensively diabetic neuropathy today.  Also discussed the importance of controlling his diabetes.  His A1c at his last check with his endocrinologist in May 2022 was 9.4.  Fortunately it has improved to 7.6 which represents reasonable control.  Discussed dietary modification as a means to strive for even better control.  Discussed adjustment of his gabapentin dose to treat his diabetic neuropathy and examined his feet today.    He has hypertension his blood pressure today is higher than ideal.  States he monitors outside of the clinic and usually has more favorable readings.  Discussed continued close monitoring to ensure that it has not overall increased warranting a change in medication.    We discussed about of her vertigo that occurred about 3 months ago.  Overall seems to be benign discussed monitoring.  Also had some unusual leg pains develop that again are very short-lived and not persistent.  We discussed monitoring.    He has erectile dysfunction.  He is use medications with some success but would like to visit with a specialist to consider other treatment approaches.  He asked me more specifically about clinics that advertise treatment and if he should pursue those I discouraged that and offered to refer him to urology for further considerations.    He has ongoing difficulties with sleep.  He ultimately reports to me is difficulty getting to sleep but ultimately stays asleep when he does  "sleep.  He feels he is getting good rest ultimately.    ROS:  Complete review of systems is obtained.  Other than the specific considerations noted above complete review of systems is negative.          Objective:   Medications:  Current Outpatient Medications   Medication     amLODIPine (NORVASC) 5 MG tablet     aspirin (ASA) 81 MG EC tablet     buPROPion (WELLBUTRIN XL) 300 MG 24 hr tablet     celecoxib (CELEBREX) 200 MG capsule     gabapentin (NEURONTIN) 300 MG capsule     gemfibrozil (LOPID) 600 MG tablet     glimepiride (AMARYL) 1 MG tablet     JARDIANCE 25 MG TABS tablet     losartan (COZAAR) 25 MG tablet     Mometasone Furo-Formoterol Fum (DULERA IN)     Needle, Disp, 20G X 1\" MISC     Needle, Disp, 23G X 1\" MISC     Syringe, Disposable, 1 ML MISC     tadalafil (CIALIS) 20 MG tablet     testosterone cypionate (DEPOTESTOSTERONE) 200 MG/ML injection     testosterone enanthate (DELATESTRYL) 200 MG/ML injection     No current facility-administered medications for this visit.        Allergies:     Allergies   Allergen Reactions     Dog Hair [Dogs]         Social History     Socioeconomic History     Marital status:      Spouse name: Not on file     Number of children: Not on file     Years of education: Not on file     Highest education level: Not on file   Occupational History     Not on file   Tobacco Use     Smoking status: Former     Types: Cigars     Start date: 2000     Quit date: 2003     Years since quittin.1     Smokeless tobacco: Never   Substance and Sexual Activity     Alcohol use: Not on file     Drug use: Not on file     Sexual activity: Not on file   Other Topics Concern     Parent/sibling w/ CABG, MI or angioplasty before 65F 55M? Not Asked   Social History Narrative     Not on file     Social Determinants of Health     Financial Resource Strain: Not on file   Food Insecurity: Not on file   Transportation Needs: Not on file   Physical Activity: Not on file   Stress: Not on file " "  Social Connections: Not on file   Intimate Partner Violence: Not on file   Housing Stability: Not on file       Family History   Problem Relation Age of Onset     Psychotic Disorder Sister         vulnerable adult     Cerebrovascular Disease Mother         Most Recent Immunizations   Administered Date(s) Administered     COVID-19,PF,Pfizer (12+ Yrs) 11/16/2021     COVID-19,PF,Pfizer 12+ Yrs (2022 and After) 04/15/2022     FLUAD(HD)65+ QUAD 11/22/2021     Flu, Unspecified 10/22/2014     Influenza (High Dose) 3 valent vaccine 02/09/2018     Influenza (IIV3) PF 10/22/2014     Influenza, Quad, High Dose, Pf, 65yr+ (Fluzone HD) 10/18/2022     Pneumo Conj 13-V (2010&after) 10/22/2014     Pneumococcal 23 valent 07/23/2012     TD (ADULT, 7+) 05/15/2002     Td (Adult), Adsorbed 07/26/2005     Tdap (Adacel,Boostrix) 08/03/2018     Zoster vaccine, live 07/03/2012        Wt Readings from Last 3 Encounters:   10/18/22 96.6 kg (213 lb)   04/15/22 98.9 kg (218 lb)   07/02/21 100.6 kg (221 lb 12.8 oz)        BP Readings from Last 6 Encounters:   10/18/22 (!) 144/84   04/15/22 136/84   07/02/21 (!) 150/88   04/16/21 (!) 144/84   08/06/20 138/86   03/17/16 151/89        Hemoglobin A1C   Date Value Ref Range Status   10/18/2022 7.6 (H) 0.0 - 5.6 % Final     Comment:     Normal <5.7%   Prediabetes 5.7-6.4%    Diabetes 6.5% or higher     Note: Adopted from ADA consensus guidelines.   04/16/2021 7.0 (H) <=5.6 % Final   08/06/2020 6.8 (H) <=5.6 % Final     Comment:     Prediabetes:   HBA1c       5.7 to 6.4%        Diabetes:        HBA1c        >=6.5%   Patients with Hgb F >5%, total bilirubin >10.0 mg/dL, abnormal red cell turnover, severe renal or hepatic disease or malignancy should not have this A1C method used to diagnose or monitor diabetes.                    PHYSICAL EXAM:    BP (!) 144/84   Pulse 90   Ht 1.651 m (5' 5\")   Wt 96.6 kg (213 lb)   SpO2 98%   BMI 35.45 kg/m           General Appearance:    Alert, cooperative, no " distress   Eyes:   No scleral icterus or conjunctival irritation       Ears:    Normal TM's and external ear canals, both ears   Throat:   Lips, mucosa, and tongue normal; teeth and gums normal   Neck:   Supple, symmetrical, trachea midline, no adenopathy;        thyroid:  No enlargement/tenderness/nodules   Lungs:     Clear to auscultation bilaterally, respirations unlabored, no wheezesor crackles   Heart:    Regular rate and rhythm,  No murmur   Extremities:  No edema, no jointswelling or redness, no evidence of any injuries, palpable dorsalis pedis pulses, palpable posterior tibialis pulses.  No ulcerations.  No significant callus formation.  No other foot deformities.   Skin:  Noconcerning skin findings, no suspicious moles, no rashes   Neurologic:  On gross examination there is no motor or sensory deficit.  Specific examination of the feet using the monofilament line revealsthat there is no absence of sensation.  Patient walks with a normal gait

## 2022-10-19 ENCOUNTER — TELEPHONE (OUTPATIENT)
Dept: FAMILY MEDICINE | Facility: CLINIC | Age: 77
End: 2022-10-19

## 2022-10-19 DIAGNOSIS — N52.9 ERECTILE DYSFUNCTION, UNSPECIFIED ERECTILE DYSFUNCTION TYPE: ICD-10-CM

## 2022-10-19 LAB
CREAT UR-MCNC: 67 MG/DL
MICROALBUMIN UR-MCNC: 48 MG/L
MICROALBUMIN/CREAT UR: 71.64 MG/G CR (ref 0–17)

## 2022-10-19 RX ORDER — TADALAFIL 20 MG/1
TABLET ORAL
Qty: 90 TABLET | Refills: 11 | Status: SHIPPED | OUTPATIENT
Start: 2022-10-19 | End: 2023-10-26

## 2022-10-19 NOTE — TELEPHONE ENCOUNTER
[facility] pharmacy called stating that pt was upset because he was informed that he could have as much cialis 20 mg whenever he wants.    Pharmacy is wondering if Dr. Jackson can send an rx for a higher quantity

## 2023-07-03 ENCOUNTER — APPOINTMENT (OUTPATIENT)
Dept: LAB | Facility: CLINIC | Age: 78
End: 2023-07-03
Payer: MEDICARE

## 2023-08-26 ENCOUNTER — HEALTH MAINTENANCE LETTER (OUTPATIENT)
Age: 78
End: 2023-08-26

## 2023-10-26 DIAGNOSIS — N52.9 ERECTILE DYSFUNCTION, UNSPECIFIED ERECTILE DYSFUNCTION TYPE: ICD-10-CM

## 2023-10-26 RX ORDER — TADALAFIL 20 MG/1
TABLET ORAL
Qty: 5 TABLET | Status: SHIPPED | OUTPATIENT
Start: 2023-10-26

## 2023-11-02 ENCOUNTER — TELEPHONE (OUTPATIENT)
Dept: PHYSICAL MEDICINE AND REHAB | Facility: CLINIC | Age: 78
End: 2023-11-02

## 2023-11-02 NOTE — TELEPHONE ENCOUNTER
[facility] Call Center    Phone Message    May a detailed message be left on voicemail: no     Reason for Call: Other: patient feeling better and will call another time to schedule      Action Taken: Other: message sent to team     Travel Screening: Not Applicable                                                                    Repeat troponin drawn and sent.

## 2023-11-04 ENCOUNTER — HEALTH MAINTENANCE LETTER (OUTPATIENT)
Age: 78
End: 2023-11-04

## 2023-12-20 ENCOUNTER — ALLIED HEALTH/NURSE VISIT (OUTPATIENT)
Dept: FAMILY MEDICINE | Facility: CLINIC | Age: 78
End: 2023-12-20
Payer: COMMERCIAL

## 2023-12-20 DIAGNOSIS — Z23 ENCOUNTER FOR IMMUNIZATION: Primary | ICD-10-CM

## 2023-12-20 PROCEDURE — 90662 IIV NO PRSV INCREASED AG IM: CPT

## 2023-12-20 PROCEDURE — 91320 SARSCV2 VAC 30MCG TRS-SUC IM: CPT

## 2023-12-20 PROCEDURE — 90480 ADMN SARSCOV2 VAC 1/ONLY CMP: CPT

## 2023-12-20 PROCEDURE — 99207 PR NO CHARGE NURSE ONLY: CPT

## 2023-12-20 PROCEDURE — G0008 ADMIN INFLUENZA VIRUS VAC: HCPCS

## 2024-01-01 ENCOUNTER — TRANSFERRED RECORDS (OUTPATIENT)
Dept: MULTI SPECIALTY CLINIC | Facility: CLINIC | Age: 79
End: 2024-01-01

## 2024-01-01 LAB — RETINOPATHY: NORMAL

## 2024-03-08 RX ORDER — METFORMIN HCL 500 MG
1500 TABLET, EXTENDED RELEASE 24 HR ORAL
COMMUNITY
Start: 2023-11-27

## 2024-03-11 ENCOUNTER — OFFICE VISIT (OUTPATIENT)
Dept: FAMILY MEDICINE | Facility: CLINIC | Age: 79
End: 2024-03-11
Payer: COMMERCIAL

## 2024-03-11 VITALS
SYSTOLIC BLOOD PRESSURE: 128 MMHG | WEIGHT: 205 LBS | OXYGEN SATURATION: 95 % | RESPIRATION RATE: 16 BRPM | TEMPERATURE: 99.1 F | BODY MASS INDEX: 34.11 KG/M2 | HEART RATE: 95 BPM | DIASTOLIC BLOOD PRESSURE: 68 MMHG

## 2024-03-11 DIAGNOSIS — H91.93 BILATERAL HEARING LOSS, UNSPECIFIED HEARING LOSS TYPE: ICD-10-CM

## 2024-03-11 DIAGNOSIS — M25.511 CHRONIC PAIN OF BOTH SHOULDERS: ICD-10-CM

## 2024-03-11 DIAGNOSIS — G89.29 CHRONIC PAIN OF BOTH SHOULDERS: ICD-10-CM

## 2024-03-11 DIAGNOSIS — G47.33 OSA (OBSTRUCTIVE SLEEP APNEA): ICD-10-CM

## 2024-03-11 DIAGNOSIS — D75.1 POLYCYTHEMIA: Primary | ICD-10-CM

## 2024-03-11 DIAGNOSIS — N52.9 ERECTILE DYSFUNCTION, UNSPECIFIED ERECTILE DYSFUNCTION TYPE: ICD-10-CM

## 2024-03-11 DIAGNOSIS — E11.69 TYPE 2 DIABETES MELLITUS WITH OTHER SPECIFIED COMPLICATION, WITHOUT LONG-TERM CURRENT USE OF INSULIN (H): ICD-10-CM

## 2024-03-11 DIAGNOSIS — M25.512 CHRONIC PAIN OF BOTH SHOULDERS: ICD-10-CM

## 2024-03-11 DIAGNOSIS — I10 ESSENTIAL HYPERTENSION: ICD-10-CM

## 2024-03-11 PROCEDURE — 99214 OFFICE O/P EST MOD 30 MIN: CPT | Performed by: FAMILY MEDICINE

## 2024-03-11 RX ORDER — RESPIRATORY SYNCYTIAL VIRUS VACCINE 120MCG/0.5
0.5 KIT INTRAMUSCULAR ONCE
Qty: 1 EACH | Refills: 0 | Status: CANCELLED | OUTPATIENT
Start: 2024-03-11 | End: 2024-03-11

## 2024-03-11 ASSESSMENT — PAIN SCALES - GENERAL: PAINLEVEL: NO PAIN (0)

## 2024-03-11 ASSESSMENT — ASTHMA QUESTIONNAIRES
QUESTION_2 LAST FOUR WEEKS HOW OFTEN HAVE YOU HAD SHORTNESS OF BREATH: NOT AT ALL
QUESTION_5 LAST FOUR WEEKS HOW WOULD YOU RATE YOUR ASTHMA CONTROL: COMPLETELY CONTROLLED
ACT_TOTALSCORE: 25
QUESTION_4 LAST FOUR WEEKS HOW OFTEN HAVE YOU USED YOUR RESCUE INHALER OR NEBULIZER MEDICATION (SUCH AS ALBUTEROL): NOT AT ALL
QUESTION_3 LAST FOUR WEEKS HOW OFTEN DID YOUR ASTHMA SYMPTOMS (WHEEZING, COUGHING, SHORTNESS OF BREATH, CHEST TIGHTNESS OR PAIN) WAKE YOU UP AT NIGHT OR EARLIER THAN USUAL IN THE MORNING: NOT AT ALL
QUESTION_1 LAST FOUR WEEKS HOW MUCH OF THE TIME DID YOUR ASTHMA KEEP YOU FROM GETTING AS MUCH DONE AT WORK, SCHOOL OR AT HOME: NONE OF THE TIME
ACT_TOTALSCORE: 25

## 2024-03-17 RX ORDER — OXYCODONE AND ACETAMINOPHEN 5; 325 MG/1; MG/1
1 TABLET ORAL EVERY 6 HOURS PRN
Qty: 4 TABLET | Refills: 0 | Status: SHIPPED | OUTPATIENT
Start: 2024-03-17 | End: 2024-03-20

## 2024-03-18 ENCOUNTER — PATIENT OUTREACH (OUTPATIENT)
Dept: ONCOLOGY | Facility: CLINIC | Age: 79
End: 2024-03-18
Payer: COMMERCIAL

## 2024-03-18 NOTE — PROGRESS NOTES
Hematology referral reviewed for [facility], see below.    Referral reason: referral from PCP for establishing with hematology for therapeutic phlebotomy for polycythemia thought to be caused by testosterone therapy and MAUDE    Clinical question entered by referring provider or through order transcription: eferral from PCP for establishing with hematology for therapeutic phlebotom    Referral received via: Internal referral by [facility]    Current abnormal labs: Available in Chart Review    Outreach: Generic voicemail left regarding referral.    Plan: Triage instructions updated and sent to [facility]PS for completion.

## 2024-03-18 NOTE — PROGRESS NOTES
Steven Joyner  /68 (BP Location: Left arm, Patient Position: Sitting, Cuff Size: Adult Large)   Pulse 95   Temp 99.1  F (37.3  C) (Temporal)   Resp 16   Wt 93 kg (205 lb)   SpO2 95%   BMI 34.11 kg/m       Assessment/Plan:                Sarkis was seen today for cough, sleep apnea, mood swings and hearing problem.    Diagnoses and all orders for this visit:    Polycythemia  -     Adult Oncology/Hematology  Referral; Future    Type 2 diabetes mellitus with other specified complication, without long-term current use of insulin (H)    Essential hypertension    Erectile dysfunction, unspecified erectile dysfunction type    MAUDE (obstructive sleep apnea)    Chronic pain of both shoulders  -     oxyCODONE-acetaminophen (PERCOCET) 5-325 MG tablet; Take 1 tablet by mouth every 6 hours as needed for pain    Bilateral hearing loss, unspecified hearing loss type         DISCUSSION  See discussion below.  We spent greater than 25 minutes discussing the considerations as noted below.  Subjective:     HPI:    Steven Joyner is a 78 year old male is a 78-year-old man who is here today to discuss several things.  I am his primary care provider but he sees an endocrinologist who manages type 2 diabetes and hypogonadism.    He has polycythemia that is believed to stem out of his testosterone treatment but also is probably related to his untreated sleep apnea.  His endocrinologist has recommended phlebotomy.  He has been unable to arrange this.  We discussed connecting him with a hematologist to help evaluate the situation and to help formulate specific treatment parameters.    He has pursued different avenues for treatment of sleep apnea.  He has not been able to tolerate any type of mask.  He is currently untreated.  He had been referred recently to reconsider the inspire.  He does not recall that he did pursue this in the past but was deemed not to be a candidate at that time but this was a number of years ago  "when it first came out.  He had gone for a consultation but due to a traffic incident he was late and was not able to be seen he became very frustrated about this.  He wanted me to try and arrange for him to be seen at a different [facility] clinic.  We talked about this a little bit and ultimately upon seeing what this entails he does not want to pursue it at all.  I urged him to reconsider.    His diabetes is not well-controlled based on most recent A1c.  He does report taking medications.  He does also report consuming a fair amount of sugar.  We talked about some dietary change considerations today.    He has postnasal drip leading to a raspy cough especially in the morning.  We discussed utilizing fluticasone nasal spray to try and help.    He has significant musculoskeletal pain issues, he on occasion travels to remote areas of the [place] on expeditions in his needed some pain medication.  He request 3 Percocet tablets which I think is reasonable as we have done this before for management of severe pain if needed.    ROS:  Complete review of systems is obtained.  Other than the specific considerations noted above complete review of systems is negative.          Objective:   Medications:  Current Outpatient Medications   Medication    amLODIPine (NORVASC) 5 MG tablet    buPROPion (WELLBUTRIN XL) 300 MG 24 hr tablet    gabapentin (NEURONTIN) 300 MG capsule    gemfibrozil (LOPID) 600 MG tablet    glimepiride (AMARYL) 1 MG tablet    JARDIANCE 25 MG TABS tablet    losartan (COZAAR) 25 MG tablet    metFORMIN (GLUCOPHAGE XR) 500 MG 24 hr tablet    Needle, Disp, 20G X 1\" MISC    Needle, Disp, 23G X 1\" MISC    oxyCODONE-acetaminophen (PERCOCET) 5-325 MG tablet    Syringe, Disposable, 1 ML MISC    tadalafil (CIALIS) 20 MG tablet    testosterone cypionate (DEPOTESTOSTERONE) 200 MG/ML injection    testosterone enanthate (DELATESTRYL) 200 MG/ML injection    aspirin (ASA) 81 MG EC tablet    celecoxib (CELEBREX) 200 MG " capsule    Mometasone Furo-Formoterol Fum (DULERA IN)     No current facility-administered medications for this visit.        Allergies:     Allergies   Allergen Reactions    Dog Hair [Dogs]         Social History     Socioeconomic History    Marital status:      Spouse name: Not on file    Number of children: Not on file    Years of education: Not on file    Highest education level: Not on file   Occupational History    Not on file   Tobacco Use    Smoking status: Former     Types: Cigars     Start date: 2000     Quit date: 2003     Years since quittin.5    Smokeless tobacco: Never   Substance and Sexual Activity    Alcohol use: Not on file    Drug use: Not on file    Sexual activity: Not on file   Other Topics Concern    Parent/sibling w/ CABG, MI or angioplasty before 65F 55M? Not Asked   Social History Narrative    Not on file     Social Determinants of Health     Financial Resource Strain: Not on file   Food Insecurity: Not on file   Transportation Needs: Not on file   Physical Activity: Not on file   Stress: Not on file   Social Connections: Not on file   Interpersonal Safety: Low Risk  (3/11/2024)    Interpersonal Safety     Do you feel physically and emotionally safe where you currently live?: Yes     Within the past 12 months, have you been hit, slapped, kicked or otherwise physically hurt by someone?: No     Within the past 12 months, have you been humiliated or emotionally abused in other ways by your partner or ex-partner?: No   Housing Stability: Not on file       Family History   Problem Relation Age of Onset    Psychotic Disorder Sister         vulnerable adult    Cerebrovascular Disease Mother         Most Recent Immunizations   Administered Date(s) Administered    COVID-19 12+ () (Pfizer) 2023    COVID-19 MONOVALENT 12+ (Pfizer) 2021    COVID-19 Monovalent 12+ (Pfizer ) 04/15/2022    Flu, Unspecified 10/22/2014    Influenza (High Dose) 3 valent vaccine  02/09/2018    Influenza (IIV3) PF 10/22/2014    Influenza Vaccine 65+ (FLUAD) 11/22/2021    Influenza Vaccine 65+ (Fluzone HD) 12/20/2023    Pneumo Conj 13-V (2010&after) 10/22/2014    Pneumococcal 23 valent 07/23/2012    TD,PF 7+ (Tenivac) 05/15/2002    TDAP (Adacel,Boostrix) 08/03/2018    Td (Adult), Adsorbed 07/26/2005    Zoster vaccine, live 07/03/2012        Wt Readings from Last 3 Encounters:   03/11/24 93 kg (205 lb)   10/18/22 96.6 kg (213 lb)   04/15/22 98.9 kg (218 lb)        BP Readings from Last 6 Encounters:   03/11/24 128/68   10/18/22 (!) 144/84   04/15/22 136/84   07/02/21 (!) 150/88   04/16/21 (!) 144/84   08/06/20 138/86        Hemoglobin A1C   Date Value Ref Range Status   10/18/2022 7.6 (H) 0.0 - 5.6 % Final     Comment:     Normal <5.7%   Prediabetes 5.7-6.4%    Diabetes 6.5% or higher     Note: Adopted from ADA consensus guidelines.   04/16/2021 7.0 (H) <=5.6 % Final   08/06/2020 6.8 (H) <=5.6 % Final     Comment:     Prediabetes:   HBA1c       5.7 to 6.4%        Diabetes:        HBA1c        >=6.5%   Patients with Hgb F >5%, total bilirubin >10.0 mg/dL, abnormal red cell turnover, severe renal or hepatic disease or malignancy should not have this A1C method used to diagnose or monitor diabetes.                    PHYSICAL EXAM:    /68 (BP Location: Left arm, Patient Position: Sitting, Cuff Size: Adult Large)   Pulse 95   Temp 99.1  F (37.3  C) (Temporal)   Resp 16   Wt 93 kg (205 lb)   SpO2 95%   BMI 34.11 kg/m           General Appearance:    Alert, cooperative, no distress   Eyes:   No scleral icterus or conjunctival irritation       Abdomen:    Soft, no distention, no tenderness on palpation, no masses, no organomegaly     Extremities:  No edema, no joint swelling or redness, no evidence of any injuries   Skin:  No concerning skin findings, no suspicious moles, no rashes   Neurologic:  On gross examination there is no motor or sensory deficit.  Patient walks with a normal gait                                      Answers submitted by the patient for this visit:  General Questionnaire (Submitted on 3/11/2024)  Chief Complaint: Chronic problems general questions HPI Form  What is the reason for your visit today? : Blood draws, sleep apnea referal  How many servings of fruits and vegetables do you eat daily?: 2-3  On average, how many sweetened beverages do you drink each day (Examples: soda, juice, sweet tea, etc.  Do NOT count diet or artificially sweetened beverages)?: 5  How many minutes a day do you exercise enough to make your heart beat faster?: 20 to 29  How many days a week do you exercise enough to make your heart beat faster?: 5  How many days per week do you miss taking your medication?: 0

## 2024-03-21 NOTE — TELEPHONE ENCOUNTER
RECORDS STATUS - ALL OTHER DIAGNOSIS      RECORDS RECEIVED FROM: [facility]   DATE RECEIVED:    NOTES STATUS DETAILS   OFFICE NOTE from referring provider Epic 3/11/24: Dr. Kashmir Jackson   OFFICE NOTE from medical oncologist Epic 5/8/18: Dr. Jostin Dow  4/11/12: Dr. Barb Ramirez   MEDICATION LIST [facility]    LABS     ANYTHING RELATED TO DIAGNOSIS [facility]- [facility] Most recent 11/27/23

## 2024-03-23 ENCOUNTER — HEALTH MAINTENANCE LETTER (OUTPATIENT)
Age: 79
End: 2024-03-23

## 2024-03-28 ENCOUNTER — TRANSFERRED RECORDS (OUTPATIENT)
Dept: HEALTH INFORMATION MANAGEMENT | Facility: CLINIC | Age: 79
End: 2024-03-28
Payer: COMMERCIAL

## 2024-04-03 ENCOUNTER — PRE VISIT (OUTPATIENT)
Dept: ONCOLOGY | Facility: CLINIC | Age: 79
End: 2024-04-03
Payer: COMMERCIAL

## 2024-06-04 ENCOUNTER — PATIENT OUTREACH (OUTPATIENT)
Dept: ONCOLOGY | Facility: HOSPITAL | Age: 79
End: 2024-06-04

## 2024-06-04 ENCOUNTER — ONCOLOGY VISIT (OUTPATIENT)
Dept: ONCOLOGY | Facility: HOSPITAL | Age: 79
End: 2024-06-04
Attending: INTERNAL MEDICINE
Payer: COMMERCIAL

## 2024-06-04 VITALS
WEIGHT: 189.5 LBS | HEART RATE: 97 BPM | DIASTOLIC BLOOD PRESSURE: 87 MMHG | OXYGEN SATURATION: 93 % | BODY MASS INDEX: 32.35 KG/M2 | HEIGHT: 64 IN | RESPIRATION RATE: 22 BRPM | SYSTOLIC BLOOD PRESSURE: 141 MMHG | TEMPERATURE: 98.4 F

## 2024-06-04 DIAGNOSIS — G47.33 OSA (OBSTRUCTIVE SLEEP APNEA): ICD-10-CM

## 2024-06-04 DIAGNOSIS — D75.1 ERYTHROCYTOSIS: Primary | ICD-10-CM

## 2024-06-04 PROCEDURE — G0463 HOSPITAL OUTPT CLINIC VISIT: HCPCS | Performed by: INTERNAL MEDICINE

## 2024-06-04 PROCEDURE — 99204 OFFICE O/P NEW MOD 45 MIN: CPT | Performed by: INTERNAL MEDICINE

## 2024-06-04 RX ORDER — HEPARIN SODIUM (PORCINE) LOCK FLUSH IV SOLN 100 UNIT/ML 100 UNIT/ML
5 SOLUTION INTRAVENOUS
Status: CANCELLED | OUTPATIENT
Start: 2024-06-11

## 2024-06-04 RX ORDER — HEPARIN SODIUM,PORCINE 10 UNIT/ML
5-20 VIAL (ML) INTRAVENOUS DAILY PRN
Status: CANCELLED | OUTPATIENT
Start: 2024-06-11

## 2024-06-04 ASSESSMENT — PAIN SCALES - GENERAL: PAINLEVEL: NO PAIN (0)

## 2024-06-04 NOTE — PROGRESS NOTES
"Oncology Rooming Note    June 4, 2024 1:41 PM   Steven Joyner is a 79 year old male who presents for:    Chief Complaint   Patient presents with    Oncology Clinic Visit     New consult related to Polycythemia.     Initial Vitals: BP (!) 141/87 (BP Location: Left arm, Patient Position: Sitting, Cuff Size: Adult Regular)   Pulse 97   Temp 98.4  F (36.9  C) (Oral)   Resp 22   Ht 1.632 m (5' 4.25\")   Wt 86 kg (189 lb 8 oz)   SpO2 93%   BMI 32.27 kg/m   Estimated body mass index is 32.27 kg/m  as calculated from the following:    Height as of this encounter: 1.632 m (5' 4.25\").    Weight as of this encounter: 86 kg (189 lb 8 oz). Body surface area is 1.97 meters squared.  No Pain (0) Comment: Data Unavailable   No LMP for male patient.  Allergies reviewed: Yes  Medications reviewed: Yes    Medications: Medication refills not needed today.  Pharmacy name entered into [employer]: [id number] [address] [facility]    Frailty Screening:   Is the patient here for a new oncology consult visit in cancer care? 1. Yes. Over the past month, have you experienced difficulty or required a caregiver to assist with:   1. Balance, walking or general mobility (including any falls)? NO  2. Completion of self-care tasks such as bathing, dressing, toileting, grooming/hygiene?  NO  3. Concentration or memory that affects your daily life?  NO       Clinical concerns: Initial consult.       Irasema Ch CMA              "

## 2024-06-04 NOTE — PROGRESS NOTES
[facility]: Cancer Care                                                                                          Situation: Chart reviewed by RN Care Coordinator.    Background: Sarkis was consulted in clinic today for erythrocytosis.    Assessment: History of obstructive sleep apnea, not compliant with CPAP.  Persistent erythrocytosis with hematocrit over 55% since 2018.    Plan/Recommendations: Patient is to have therapeutic phlebotomies every 2 weeks.  He is to follow-up in clinic in about 1 month.      Signature:  Rosita Arroyo  RN Care Coordinator  [facility]  Cancer [facility]

## 2024-06-04 NOTE — LETTER
"6/4/2024      Steven Joyner  [address] [place] [address]      Dear Colleague,    Thank you for referring your patient, Steven Joyner, to the [facility]. Please see a copy of my visit note below.    Oncology Rooming Note    June 4, 2024 1:41 PM   Steven Joyner is a 79 year old male who presents for:    Chief Complaint   Patient presents with     Oncology Clinic Visit     New consult related to Polycythemia.     Initial Vitals: BP (!) 141/87 (BP Location: Left arm, Patient Position: Sitting, Cuff Size: Adult Regular)   Pulse 97   Temp 98.4  F (36.9  C) (Oral)   Resp 22   Ht 1.632 m (5' 4.25\")   Wt 86 kg (189 lb 8 oz)   SpO2 93%   BMI 32.27 kg/m   Estimated body mass index is 32.27 kg/m  as calculated from the following:    Height as of this encounter: 1.632 m (5' 4.25\").    Weight as of this encounter: 86 kg (189 lb 8 oz). Body surface area is 1.97 meters squared.  No Pain (0) Comment: Data Unavailable   No LMP for male patient.  Allergies reviewed: Yes  Medications reviewed: Yes    Medications: Medication refills not needed today.  Pharmacy name entered into [employer]: [id number] [place] [id number] [facility]    Frailty Screening:   Is the patient here for a new oncology consult visit in cancer care? 1. Yes. Over the past month, have you experienced difficulty or required a caregiver to assist with:   1. Balance, walking or general mobility (including any falls)? NO  2. Completion of self-care tasks such as bathing, dressing, toileting, grooming/hygiene?  NO  3. Concentration or memory that affects your daily life?  NO       Clinical concerns: Initial consult.       Irasema Ch CMA                [facility] Hematology and Oncology Consult Note    Patient: Steven Joyner  MRN: [id number]  Date of Service: Jun 4, 2024       Reason for Visit    I was consulted by Kashmir Jackson MD for erythrocytosis      Encounter Diagnoses Assessment and " "Plan:    Problem List Items Addressed This Visit       MAUDE (obstructive sleep apnea)    Erythrocytosis - Primary     Patient with a history of obstructive sleep apnea not compliant with CPAP.  Patient has had persistent erythrocytosis with hematocrit over 55 % since 2018.  JAK2 study performed in 2015 was negative.  An erythropoietin in 2012 was within normal limits at 14.  Advised patient to have therapeutic phlebotomy to keep hematocrit under 50.  He is presently on low-dose aspirin.      ______________________________________________________________________________      History of Present Illness    Mr. Steven Joyner is a 79 year old man with a history of erythrocytosis going back at least 15 years.  He has known sleep apnea but is not compliant with CPAP.  He does note fatigue all the time.  He does not have fevers or sweats or unusual itching.  Previous workups have not shown any evidence of polycythemia vera.  Aside from his fatigue he does not seem to have any symptoms related to erythrocytosis or sleep apnea.  He has lost about 25 pounds over the last year.  This may be related to poorly controlled diabetes with his last hemoglobin A1c being recorded at 8.4.  He is a former smoker.  He does not use alcohol to excess.  He is a retired [place] .    Review of systems.  Pertinent Findings are included in the History of Present Illness    Physical Exam    BP (!) 141/87 (BP Location: Left arm, Patient Position: Sitting, Cuff Size: Adult Regular)   Pulse 97   Temp 98.4  F (36.9  C) (Oral)   Resp 22   Ht 1.632 m (5' 4.25\")   Wt 86 kg (189 lb 8 oz)   SpO2 93%   BMI 32.27 kg/m       GENERAL APPEARANCE: 79-year-old man in no apparent distress.  HEAD: Atraumatic; normocephalic; without lesions.  Hard of hearing  EYES: Conjunctiva, corneas and eyelids normal; pupils equal, round, reactive to light; No Icterus.  MOUTH/OROPHARYNX: Oral mucosa intact.  NECK: Supple with no nodes.  LUNGS:  Clear to " "auscultation and percussion with no extra sounds.  HEART: Regular rhythm and rate; S1 and S2 normal; no murmurs noted.  ABDOMEN: Soft; no masses or tenderness, no hepatosplenomegaly.  NEUROLOGIC: Alert and oriented.  No obvious focal findings.  EXTREMITIES: No cyanosis, or edema.  SKIN: No abnormal bruising or bleeding. No suspicious lesions noted on exposed skin.  PSYCHIATRIC: Mental status normal; no apparent psychiatric issues    Medications:    Current Outpatient Medications   Medication Sig Dispense Refill     amLODIPine (NORVASC) 5 MG tablet Take 5 mg by mouth       aspirin (ASA) 81 MG EC tablet Take 81 mg by mouth       gabapentin (NEURONTIN) 300 MG capsule TAKE 1 CAPSULE BY MOUTH THREE TIMES A DAY 90 capsule 3     gemfibrozil (LOPID) 600 MG tablet Take 600 mg by mouth       glimepiride (AMARYL) 1 MG tablet Take 1 mg by mouth       JARDIANCE 25 MG TABS tablet Take 25 mg by mouth daily       losartan (COZAAR) 25 MG tablet Take 25 mg by mouth daily       metFORMIN (GLUCOPHAGE XR) 500 MG 24 hr tablet Take 1,500 mg by mouth 2 times daily (with meals)       Multiple Vitamins-Minerals (MULTIVITAMIN ADULT, MINERALS,) TABS        Needle, Disp, 20G X 1\" MISC every 14 days. To be used to draw up testosterone 30 each 1     Needle, Disp, 23G X 1\" MISC every 14 days. To be used to inject medication testosterone 30 each 1     Syringe, Disposable, 1 ML MISC every 14 days. To use with injections 30 each 1     tadalafil (CIALIS) 20 MG tablet TAKE 1 TABLET BY MOUTH EVERY DAY AS NEEDED 5 tablet PRN     testosterone cypionate (DEPOTESTOSTERONE) 200 MG/ML injection INJECT 1 ML INTRAMUSCULAR EVERY 2 WEEKS       testosterone enanthate (DELATESTRYL) 200 MG/ML injection Inject  into the muscle. Inject 150 mg (0.75 ml) IM every 2 weeks. 5 mL 3     buPROPion (WELLBUTRIN XL) 300 MG 24 hr tablet Take 300 mg by mouth every morning (Patient not taking: Reported on 6/4/2024)       celecoxib (CELEBREX) 200 MG capsule TAKE 1 CAPSULE BY MOUTH " DAILY WITH MEAL AS NEEDED (Patient not taking: Reported on 3/11/2024)       Mometasone Furo-Formoterol Fum (DULERA IN) Inhale 2 puffs into the lungs 2 times daily. (Patient not taking: Reported on 3/11/2024)       No current facility-administered medications for this visit.           Past History    Past Medical History:   Diagnosis Date     Benign positional vertigo      Cellulitis and abscess of leg 2012    hospitalized at [facility] for treatment     Conductive hearing loss      Hearing problem      Hypertension      Hypogonadism, male      Obstructive sleep apnea      MAUDE (obstructive sleep apnea)      Problem, psychiatric      Recurrent otitis media      Tinnitus      Uncomplicated asthma      Past Surgical History:   Procedure Laterality Date     ENT SURGERY       HC REMOVAL OF TONSILS,<13 Y/O      Description: Tonsillectomy;  Recorded: 2011;     ZZC APPENDECTOMY      Description: Appendectomy;  Recorded: 2008;     Allergies   Allergen Reactions     Dog Hair [Dogs]      Family History   Problem Relation Age of Onset     Psychotic Disorder Sister         vulnerable adult     Cerebrovascular Disease Mother      Social History     Socioeconomic History     Marital status:      Spouse name: None     Number of children: None     Years of education: None     Highest education level: None   Tobacco Use     Smoking status: Former     Types: Cigars     Start date: 2000     Quit date: 2003     Years since quittin.7     Smokeless tobacco: Never     Social Determinants of Health      [facility]    Financial Resource Strain    [facility] [employer] [facility] [employer] [facility]    Social Connections   Interpersonal Safety: Low Risk  (3/11/2024)    Interpersonal Safety      Do you feel physically and emotionally safe where you  currently live?: Yes      Within the past 12 months, have you been hit, slapped, kicked or otherwise physically hurt by someone?: No      Within the past 12 months, have you been humiliated or emotionally abused in other ways by your partner or ex-partner?: No           Lab Results    No results found for this or any previous visit (from the past 240 hour(s)).    Imaging Results    No results found.      I spent 45 minutes on the patient's visit today.  This included preparation for the visit, face-to-face time with the patient and documentation following the visit.  It did not include teaching or procedure time.    Signed by: Peter E. Friedell, MD          Again, thank you for allowing me to participate in the care of your patient.        Sincerely,        Peter E. Friedell, MD

## 2024-06-04 NOTE — PROGRESS NOTES
"[facility] Hematology and Oncology Consult Note    Patient: Steven Joyner  MRN: [id number]  Date of Service: Jun 4, 2024       Reason for Visit    I was consulted by Kashmir Jackson MD for erythrocytosis      Encounter Diagnoses Assessment and Plan:    Problem List Items Addressed This Visit       MAUDE (obstructive sleep apnea)    Erythrocytosis - Primary     Patient with a history of obstructive sleep apnea not compliant with CPAP.  Patient has had persistent erythrocytosis with hematocrit over 55 % since 2018.  JAK2 study performed in 2015 was negative.  An erythropoietin in 2012 was within normal limits at 14.  Advised patient to have therapeutic phlebotomy to keep hematocrit under 50.  He is presently on low-dose aspirin.      ______________________________________________________________________________      History of Present Illness    Mr. Steven Joyner is a 79 year old man with a history of erythrocytosis going back at least 15 years.  He has known sleep apnea but is not compliant with CPAP.  He does note fatigue all the time.  He does not have fevers or sweats or unusual itching.  Previous workups have not shown any evidence of polycythemia vera.  Aside from his fatigue he does not seem to have any symptoms related to erythrocytosis or sleep apnea.  He has lost about 25 pounds over the last year.  This may be related to poorly controlled diabetes with his last hemoglobin A1c being recorded at 8.4.  He is a former smoker.  He does not use alcohol to excess.  He is a retired [employer] .    Review of systems.  Pertinent Findings are included in the History of Present Illness    Physical Exam    BP (!) 141/87 (BP Location: Left arm, Patient Position: Sitting, Cuff Size: Adult Regular)   Pulse 97   Temp 98.4  F (36.9  C) (Oral)   Resp 22   Ht 1.632 m (5' 4.25\")   Wt 86 kg (189 lb 8 oz)   SpO2 93%   BMI 32.27 kg/m       GENERAL APPEARANCE: 79-year-old man in no apparent " "distress.  HEAD: Atraumatic; normocephalic; without lesions.  Hard of hearing  EYES: Conjunctiva, corneas and eyelids normal; pupils equal, round, reactive to light; No Icterus.  MOUTH/OROPHARYNX: Oral mucosa intact.  NECK: Supple with no nodes.  LUNGS:  Clear to auscultation and percussion with no extra sounds.  HEART: Regular rhythm and rate; S1 and S2 normal; no murmurs noted.  ABDOMEN: Soft; no masses or tenderness, no hepatosplenomegaly.  NEUROLOGIC: Alert and oriented.  No obvious focal findings.  EXTREMITIES: No cyanosis, or edema.  SKIN: No abnormal bruising or bleeding. No suspicious lesions noted on exposed skin.  PSYCHIATRIC: Mental status normal; no apparent psychiatric issues    Medications:    Current Outpatient Medications   Medication Sig Dispense Refill    amLODIPine (NORVASC) 5 MG tablet Take 5 mg by mouth      aspirin (ASA) 81 MG EC tablet Take 81 mg by mouth      gabapentin (NEURONTIN) 300 MG capsule TAKE 1 CAPSULE BY MOUTH THREE TIMES A DAY 90 capsule 3    gemfibrozil (LOPID) 600 MG tablet Take 600 mg by mouth      glimepiride (AMARYL) 1 MG tablet Take 1 mg by mouth      JARDIANCE 25 MG TABS tablet Take 25 mg by mouth daily      losartan (COZAAR) 25 MG tablet Take 25 mg by mouth daily      metFORMIN (GLUCOPHAGE XR) 500 MG 24 hr tablet Take 1,500 mg by mouth 2 times daily (with meals)      Multiple Vitamins-Minerals (MULTIVITAMIN ADULT, MINERALS,) TABS       Needle, Disp, 20G X 1\" MISC every 14 days. To be used to draw up testosterone 30 each 1    Needle, Disp, 23G X 1\" MISC every 14 days. To be used to inject medication testosterone 30 each 1    Syringe, Disposable, 1 ML MISC every 14 days. To use with injections 30 each 1    tadalafil (CIALIS) 20 MG tablet TAKE 1 TABLET BY MOUTH EVERY DAY AS NEEDED 5 tablet PRN    testosterone cypionate (DEPOTESTOSTERONE) 200 MG/ML injection INJECT 1 ML INTRAMUSCULAR EVERY 2 WEEKS      testosterone enanthate (DELATESTRYL) 200 MG/ML injection Inject  into the " muscle. Inject 150 mg (0.75 ml) IM every 2 weeks. 5 mL 3    buPROPion (WELLBUTRIN XL) 300 MG 24 hr tablet Take 300 mg by mouth every morning (Patient not taking: Reported on 2024)      celecoxib (CELEBREX) 200 MG capsule TAKE 1 CAPSULE BY MOUTH DAILY WITH MEAL AS NEEDED (Patient not taking: Reported on 3/11/2024)      Mometasone Furo-Formoterol Fum (DULERA IN) Inhale 2 puffs into the lungs 2 times daily. (Patient not taking: Reported on 3/11/2024)       No current facility-administered medications for this visit.           Past History    Past Medical History:   Diagnosis Date    Benign positional vertigo     Cellulitis and abscess of leg 2012    hospitalized at [facility] for treatment    Conductive hearing loss     Hearing problem     Hypertension     Hypogonadism, male     Obstructive sleep apnea     MAUDE (obstructive sleep apnea)     Problem, psychiatric     Recurrent otitis media     Tinnitus     Uncomplicated asthma      Past Surgical History:   Procedure Laterality Date    ENT SURGERY      HC REMOVAL OF TONSILS,<13 Y/O      Description: Tonsillectomy;  Recorded: 2011;    ZZC APPENDECTOMY      Description: Appendectomy;  Recorded: 2008;     Allergies   Allergen Reactions    Dog Hair [Dogs]      Family History   Problem Relation Age of Onset    Psychotic Disorder Sister         vulnerable adult    Cerebrovascular Disease Mother      Social History     Socioeconomic History    Marital status:      Spouse name: None    Number of children: None    Years of education: None    Highest education level: None   Tobacco Use    Smoking status: Former     Types: Cigars     Start date: 2000     Quit date: 2003     Years since quittin.7    Smokeless tobacco: Never     Social Determinants of Health      Received from [employer] [facility], [employer] [facility]    Financial Resource Strain    Received from [employer] [facility]  [facility], [facility] & [facility]    Social Connections   Interpersonal Safety: Low Risk  (3/11/2024)    Interpersonal Safety     Do you feel physically and emotionally safe where you currently live?: Yes     Within the past 12 months, have you been hit, slapped, kicked or otherwise physically hurt by someone?: No     Within the past 12 months, have you been humiliated or emotionally abused in other ways by your partner or ex-partner?: No           Lab Results    No results found for this or any previous visit (from the past 240 hour(s)).    Imaging Results    No results found.      I spent 45 minutes on the patient's visit today.  This included preparation for the visit, face-to-face time with the patient and documentation following the visit.  It did not include teaching or procedure time.    Signed by: Peter E. Friedell, MD

## 2024-06-28 ENCOUNTER — MYC MEDICAL ADVICE (OUTPATIENT)
Dept: FAMILY MEDICINE | Facility: CLINIC | Age: 79
End: 2024-06-28
Payer: COMMERCIAL

## 2024-07-02 ENCOUNTER — APPOINTMENT (OUTPATIENT)
Dept: INFUSION THERAPY | Facility: HOSPITAL | Age: 79
End: 2024-07-02
Attending: INTERNAL MEDICINE
Payer: COMMERCIAL

## 2024-07-02 ENCOUNTER — ONCOLOGY VISIT (OUTPATIENT)
Dept: ONCOLOGY | Facility: HOSPITAL | Age: 79
End: 2024-07-02
Attending: INTERNAL MEDICINE
Payer: COMMERCIAL

## 2024-07-02 VITALS
RESPIRATION RATE: 18 BRPM | TEMPERATURE: 97.8 F | BODY MASS INDEX: 32.27 KG/M2 | HEIGHT: 64 IN | DIASTOLIC BLOOD PRESSURE: 82 MMHG | HEART RATE: 92 BPM | SYSTOLIC BLOOD PRESSURE: 137 MMHG | WEIGHT: 189 LBS | OXYGEN SATURATION: 95 %

## 2024-07-02 DIAGNOSIS — D75.1 ERYTHROCYTOSIS: Primary | ICD-10-CM

## 2024-07-02 DIAGNOSIS — G47.33 OSA (OBSTRUCTIVE SLEEP APNEA): ICD-10-CM

## 2024-07-02 LAB
BASOPHILS # BLD AUTO: 0.1 10E3/UL (ref 0–0.2)
BASOPHILS NFR BLD AUTO: 1 %
EOSINOPHIL # BLD AUTO: 0.2 10E3/UL (ref 0–0.7)
EOSINOPHIL NFR BLD AUTO: 4 %
ERYTHROCYTE [DISTWIDTH] IN BLOOD BY AUTOMATED COUNT: 13.7 % (ref 10–15)
HCT VFR BLD AUTO: 48.2 % (ref 40–53)
HCT VFR BLD AUTO: 48.6 % (ref 40–53)
HGB BLD-MCNC: 16.4 G/DL (ref 13.3–17.7)
HGB BLD-MCNC: 16.4 G/DL (ref 13.3–17.7)
IMM GRANULOCYTES # BLD: 0 10E3/UL
IMM GRANULOCYTES NFR BLD: 1 %
LYMPHOCYTES # BLD AUTO: 1.6 10E3/UL (ref 0.8–5.3)
LYMPHOCYTES NFR BLD AUTO: 30 %
MCH RBC QN AUTO: 32 PG (ref 26.5–33)
MCHC RBC AUTO-ENTMCNC: 33.7 G/DL (ref 31.5–36.5)
MCV RBC AUTO: 95 FL (ref 78–100)
MONOCYTES # BLD AUTO: 0.6 10E3/UL (ref 0–1.3)
MONOCYTES NFR BLD AUTO: 11 %
NEUTROPHILS # BLD AUTO: 2.9 10E3/UL (ref 1.6–8.3)
NEUTROPHILS NFR BLD AUTO: 54 %
NRBC # BLD AUTO: 0 10E3/UL
NRBC BLD AUTO-RTO: 0 /100
PLATELET # BLD AUTO: 196 10E3/UL (ref 150–450)
RBC # BLD AUTO: 5.12 10E6/UL (ref 4.4–5.9)
WBC # BLD AUTO: 5.4 10E3/UL (ref 4–11)

## 2024-07-02 PROCEDURE — G0463 HOSPITAL OUTPT CLINIC VISIT: HCPCS | Performed by: INTERNAL MEDICINE

## 2024-07-02 PROCEDURE — 85025 COMPLETE CBC W/AUTO DIFF WBC: CPT

## 2024-07-02 PROCEDURE — G2211 COMPLEX E/M VISIT ADD ON: HCPCS | Performed by: INTERNAL MEDICINE

## 2024-07-02 PROCEDURE — 36415 COLL VENOUS BLD VENIPUNCTURE: CPT | Performed by: INTERNAL MEDICINE

## 2024-07-02 PROCEDURE — 85018 HEMOGLOBIN: CPT | Performed by: INTERNAL MEDICINE

## 2024-07-02 PROCEDURE — 99214 OFFICE O/P EST MOD 30 MIN: CPT | Performed by: INTERNAL MEDICINE

## 2024-07-02 RX ORDER — HEPARIN SODIUM (PORCINE) LOCK FLUSH IV SOLN 100 UNIT/ML 100 UNIT/ML
5 SOLUTION INTRAVENOUS
OUTPATIENT
Start: 2024-07-02

## 2024-07-02 RX ORDER — HEPARIN SODIUM,PORCINE 10 UNIT/ML
5-20 VIAL (ML) INTRAVENOUS DAILY PRN
OUTPATIENT
Start: 2024-07-02

## 2024-07-02 ASSESSMENT — PAIN SCALES - GENERAL: PAINLEVEL: NO PAIN (0)

## 2024-07-02 NOTE — PROGRESS NOTES
"Oncology Rooming Note    July 2, 2024 1:11 PM   Steven Joyner is a 79 year old male who presents for:    Chief Complaint   Patient presents with    Oncology Clinic Visit     Labs and phlebotomy      Initial Vitals: /82 (BP Location: Right arm, Patient Position: Sitting, Cuff Size: Adult Regular)   Pulse 92   Temp 97.8  F (36.6  C) (Tympanic)   Resp 18   Ht 1.632 m (5' 4.25\")   Wt 85.7 kg (189 lb)   SpO2 95%   BMI 32.19 kg/m   Estimated body mass index is 32.19 kg/m  as calculated from the following:    Height as of this encounter: 1.632 m (5' 4.25\").    Weight as of this encounter: 85.7 kg (189 lb). Body surface area is 1.97 meters squared.  No Pain (0) Comment: Data Unavailable   No LMP for male patient.  Allergies reviewed: Yes  Medications reviewed: Yes    Medications: Medication refills not needed today.  Pharmacy name entered into [employer]: [id number] [address] [facility]    Frailty Screening:   Is the patient here for a new oncology consult visit in cancer care? 2. No      Clinical concerns: None      JEANINE ESCALONA CMA            "

## 2024-07-02 NOTE — PROGRESS NOTES
[facility] Hematology and Oncology Progress Note    Patient: Steven Joyner  MRN: [id number]  Date of Service: Jul 2, 2024        Reason for Visit    Problem List Items Addressed This Visit          Respiratory    MAUDE (obstructive sleep apnea)    Relevant Orders    CBC with platelets       Hematologic    Erythrocytosis - Primary    Relevant Orders    CBC with platelets and differential (Completed)    CBC with platelets        Assessment and Plan     Cancer Staging   No matching staging information was found for the patient.    Secondary Erythrocytosis, Hx of MAUDE  Sarkis has had erythrocytosis for 15 + years due to long time history of MAUDE without use of CPAP. He has tried 3 different types of CPAP and will not try any more. He also has a hx of fatigue and weight loss in the last year that he attributes to poor appetite. He has not been able to eat as much as he normally did in the past. He has lost 30 lbs in the past year and notes he cannot dead lift as much as he used to. He denies fever, chills, drenching night sweats. He does not have any other concerning symptoms. He denies paroxysmal nocturnal dyspnea.  Since he is lost weight more than likely his sleep apnea has improved to some extent.  His last few hemoglobin hematocrit have been in the normal range.  Even today's hemoglobin is 16.4 with a hematocrit of 48.6.  At this time no need for any phlebotomy.  Will see back in 6 months with labs to continue to monitor.     The longitudinal plan of care for the diagnosis(es)/condition(s) as documented were addressed during this visit. Due to the added complexity in care, I will continue to support Sarkis in the subsequent management and with ongoing continuity of care.      ECOG Performance    0 - Independent    Distress Screening (within last 30 days)    No data recorded     Pain  Pain Score: No Pain (0)    Problem List    Patient Active Problem List   Diagnosis    Hypogonadism, male    MAUDE (obstructive sleep  "apnea)    Essential hypertension    Abdominal pain    Abnormal weight gain    Allergic rhinitis    Bloating    BPH (benign prostatic hyperplasia)    Cervicalgia    Contact dermatitis and other eczema, due to unspecified cause    Diabetes mellitus, type 2 (H)    Dietary counseling and surveillance    Disorder of bursae and tendons in shoulder region    Erythrocytosis    Acquired polycythemia    Hallux limitus of left foot    Hallux valgus    Hypercholesterolemia    Mild intermittent asthma    Morbid obesity (H)    Post-traumatic stress disorder    Psychosexual dysfunction with inhibited sexual excitement        ______________________________________________________________________________    History of Present Illness    Sarkis is here for follow up of erythrocytosis. He saw Dr. Friedell in June 2024 and notes no changes in symptoms since then. He has not had a phlebotomy or donated blood. He notes his fatigue and poor appetite has persisted and he is not able to dead lift as much as he used to. He denies fevers, chills, or night sweats. He denies headaches, vision changes, breathing changes, chest pain, bowel or bladder changes, leg swelling, or new pain. He has chronic lower back pain that flares up every once in awhile.     Review of Systems    Pertinent items are noted in HPI.    Past History    Past Medical History:   Diagnosis Date    Benign positional vertigo     Cellulitis and abscess of leg Feb. 2012    hospitalized at [facility] for treatment    Conductive hearing loss     Hearing problem     Hypertension     Hypogonadism, male     Obstructive sleep apnea     MAUDE (obstructive sleep apnea)     Problem, psychiatric     Recurrent otitis media     Tinnitus     Uncomplicated asthma        Physical Exam        7/2/2024    12:58 PM   Vital Signs   Systolic 137   Diastolic 82   Pulse 92   Temperature 97.8  F (36.6  C)   Respirations 18   Weight (LB) 189 lb   Height 5' 4.25\"   BMI (Calculated) 32.19   Pain " Score 0 (None)   O2 95 %       General: alert, appears stated age, and cooperative  HEENT: Head: Normal, normocephalic, atraumatic. Anicteric  Chest: Normal chest wall and respirations. Clear to auscultation.  Cardiac: regular rate and rhythm.   Abdomen: no tenderness. No distention. No splenomegaly or hepatomegaly   Extremities: no lower extremity edema   Skin: no rash or abnormalities  CNS: grossly non focal   Lymphatics: No cervical, supraclavicular, or axillary lymphadenopathy.       Lab Results    Recent Results (from the past 168 hour(s))   Hemoglobin and hematocrit   Result Value Ref Range    Hemoglobin 16.4 13.3 - 17.7 g/dL    Hematocrit 48.2 40.0 - 53.0 %   CBC with platelets and differential   Result Value Ref Range    WBC Count 5.4 4.0 - 11.0 10e3/uL    RBC Count 5.12 4.40 - 5.90 10e6/uL    Hemoglobin 16.4 13.3 - 17.7 g/dL    Hematocrit 48.6 40.0 - 53.0 %    MCV 95 78 - 100 fL    MCH 32.0 26.5 - 33.0 pg    MCHC 33.7 31.5 - 36.5 g/dL    RDW 13.7 10.0 - 15.0 %    Platelet Count 196 150 - 450 10e3/uL    % Neutrophils 54 %    % Lymphocytes 30 %    % Monocytes 11 %    % Eosinophils 4 %    % Basophils 1 %    % Immature Granulocytes 1 %    NRBCs per 100 WBC 0 <1 /100    Absolute Neutrophils 2.9 1.6 - 8.3 10e3/uL    Absolute Lymphocytes 1.6 0.8 - 5.3 10e3/uL    Absolute Monocytes 0.6 0.0 - 1.3 10e3/uL    Absolute Eosinophils 0.2 0.0 - 0.7 10e3/uL    Absolute Basophils 0.1 0.0 - 0.2 10e3/uL    Absolute Immature Granulocytes 0.0 <=0.4 10e3/uL    Absolute NRBCs 0.0 10e3/uL       Imaging    No results found.      Signed by: Radha Pérez PA-C

## 2024-07-02 NOTE — LETTER
"7/2/2024      Steven Joyner  [address] [place] [address]      Dear Colleague,    Thank you for referring your patient, Steven Joyner, to the [facility]. Please see a copy of my visit note below.    Oncology Rooming Note    July 2, 2024 1:11 PM   Steven Joyner is a 79 year old male who presents for:    Chief Complaint   Patient presents with     Oncology Clinic Visit     Labs and phlebotomy      Initial Vitals: /82 (BP Location: Right arm, Patient Position: Sitting, Cuff Size: Adult Regular)   Pulse 92   Temp 97.8  F (36.6  C) (Tympanic)   Resp 18   Ht 1.632 m (5' 4.25\")   Wt 85.7 kg (189 lb)   SpO2 95%   BMI 32.19 kg/m   Estimated body mass index is 32.19 kg/m  as calculated from the following:    Height as of this encounter: 1.632 m (5' 4.25\").    Weight as of this encounter: 85.7 kg (189 lb). Body surface area is 1.97 meters squared.  No Pain (0) Comment: Data Unavailable   No LMP for male patient.  Allergies reviewed: Yes  Medications reviewed: Yes    Medications: Medication refills not needed today.  Pharmacy name entered into [employer]: [id number] [address] [facility]    Frailty Screening:   Is the patient here for a new oncology consult visit in cancer care? 2. No      Clinical concerns: None      JEANINE ESCALONA CMA              [facility] Hematology and Oncology Progress Note    Patient: Steven Joyner  MRN: [id number]  Date of Service: Jul 2, 2024        Reason for Visit    Problem List Items Addressed This Visit          Respiratory    MAUDE (obstructive sleep apnea)    Relevant Orders    CBC with platelets       Hematologic    Erythrocytosis - Primary    Relevant Orders    CBC with platelets and differential (Completed)    CBC with platelets        Assessment and Plan     Cancer Staging   No matching staging information was found for the patient.    Secondary Erythrocytosis, Hx of MAUDE  Sarkis has had erythrocytosis for 15 " + years due to long time history of MAUDE without use of CPAP. He has tried 3 different types of CPAP and will not try any more. He also has a hx of fatigue and weight loss in the last year that he attributes to poor appetite. He has not been able to eat as much as he normally did in the past. He has lost 30 lbs in the past year and notes he cannot dead lift as much as he used to. He denies fever, chills, drenching night sweats. He does not have any other concerning symptoms. He denies paroxysmal nocturnal dyspnea.  Since he is lost weight more than likely his sleep apnea has improved to some extent.  His last few hemoglobin hematocrit have been in the normal range.  Even today's hemoglobin is 16.4 with a hematocrit of 48.6.  At this time no need for any phlebotomy.  Will see back in 6 months with labs to continue to monitor.     The longitudinal plan of care for the diagnosis(es)/condition(s) as documented were addressed during this visit. Due to the added complexity in care, I will continue to support Sarkis in the subsequent management and with ongoing continuity of care.      ECOG Performance    0 - Independent    Distress Screening (within last 30 days)    No data recorded     Pain  Pain Score: No Pain (0)    Problem List    Patient Active Problem List   Diagnosis     Hypogonadism, male     MAUDE (obstructive sleep apnea)     Essential hypertension     Abdominal pain     Abnormal weight gain     Allergic rhinitis     Bloating     BPH (benign prostatic hyperplasia)     Cervicalgia     Contact dermatitis and other eczema, due to unspecified cause     Diabetes mellitus, type 2 (H)     Dietary counseling and surveillance     Disorder of bursae and tendons in shoulder region     Erythrocytosis     Acquired polycythemia     Hallux limitus of left foot     Hallux valgus     Hypercholesterolemia     Mild intermittent asthma     Morbid obesity (H)     Post-traumatic stress disorder     Psychosexual dysfunction with inhibited  "sexual excitement        ______________________________________________________________________________    History of Present Illness    Sarkis is here for follow up of erythrocytosis. He saw Dr. Friedell in June 2024 and notes no changes in symptoms since then. He has not had a phlebotomy or donated blood. He notes his fatigue and poor appetite has persisted and he is not able to dead lift as much as he used to. He denies fevers, chills, or night sweats. He denies headaches, vision changes, breathing changes, chest pain, bowel or bladder changes, leg swelling, or new pain. He has chronic lower back pain that flares up every once in awhile.     Review of Systems    Pertinent items are noted in HPI.    Past History    Past Medical History:   Diagnosis Date     Benign positional vertigo      Cellulitis and abscess of leg Feb. 2012    hospitalized at [facility] for treatment     Conductive hearing loss      Hearing problem      Hypertension      Hypogonadism, male      Obstructive sleep apnea      MAUDE (obstructive sleep apnea)      Problem, psychiatric      Recurrent otitis media      Tinnitus      Uncomplicated asthma        Physical Exam        7/2/2024    12:58 PM   Vital Signs   Systolic 137   Diastolic 82   Pulse 92   Temperature 97.8  F (36.6  C)   Respirations 18   Weight (LB) 189 lb   Height 5' 4.25\"   BMI (Calculated) 32.19   Pain Score 0 (None)   O2 95 %       General: alert, appears stated age, and cooperative  HEENT: Head: Normal, normocephalic, atraumatic. Anicteric  Chest: Normal chest wall and respirations. Clear to auscultation.  Cardiac: regular rate and rhythm.   Abdomen: no tenderness. No distention. No splenomegaly or hepatomegaly   Extremities: no lower extremity edema   Skin: no rash or abnormalities  CNS: grossly non focal   Lymphatics: No cervical, supraclavicular, or axillary lymphadenopathy.       Lab Results    Recent Results (from the past 168 hour(s))   Hemoglobin and hematocrit "   Result Value Ref Range    Hemoglobin 16.4 13.3 - 17.7 g/dL    Hematocrit 48.2 40.0 - 53.0 %   CBC with platelets and differential   Result Value Ref Range    WBC Count 5.4 4.0 - 11.0 10e3/uL    RBC Count 5.12 4.40 - 5.90 10e6/uL    Hemoglobin 16.4 13.3 - 17.7 g/dL    Hematocrit 48.6 40.0 - 53.0 %    MCV 95 78 - 100 fL    MCH 32.0 26.5 - 33.0 pg    MCHC 33.7 31.5 - 36.5 g/dL    RDW 13.7 10.0 - 15.0 %    Platelet Count 196 150 - 450 10e3/uL    % Neutrophils 54 %    % Lymphocytes 30 %    % Monocytes 11 %    % Eosinophils 4 %    % Basophils 1 %    % Immature Granulocytes 1 %    NRBCs per 100 WBC 0 <1 /100    Absolute Neutrophils 2.9 1.6 - 8.3 10e3/uL    Absolute Lymphocytes 1.6 0.8 - 5.3 10e3/uL    Absolute Monocytes 0.6 0.0 - 1.3 10e3/uL    Absolute Eosinophils 0.2 0.0 - 0.7 10e3/uL    Absolute Basophils 0.1 0.0 - 0.2 10e3/uL    Absolute Immature Granulocytes 0.0 <=0.4 10e3/uL    Absolute NRBCs 0.0 10e3/uL       Imaging    No results found.      Signed by: Radha Pérez PA-C      Again, thank you for allowing me to participate in the care of your patient.        Sincerely,        Antonio Lagunas MD

## 2024-07-03 VITALS — DIASTOLIC BLOOD PRESSURE: 79 MMHG | SYSTOLIC BLOOD PRESSURE: 129 MMHG

## 2024-07-05 ENCOUNTER — PATIENT OUTREACH (OUTPATIENT)
Dept: ONCOLOGY | Facility: HOSPITAL | Age: 79
End: 2024-07-05
Payer: COMMERCIAL

## 2024-07-05 NOTE — PROGRESS NOTES
[facility]: Cancer Care                                                                                          Situation: Chart reviewed by RN Care Coordinator.    Background: Patient was seen in clinic this week for follow-up regarding erythrocytosis.    Assessment: Labs show improvement.    Plan/Recommendations: Patient to be seen again in 6 months with labs.      Signature:  Rosita Arroyo  RN Care Coordinator  [facility]

## 2024-07-08 ENCOUNTER — PATIENT OUTREACH (OUTPATIENT)
Dept: ONCOLOGY | Facility: HOSPITAL | Age: 79
End: 2024-07-08
Payer: COMMERCIAL

## 2024-07-08 NOTE — PROGRESS NOTES
[facility]: Cancer Care                                                                                          Situation: Chart reviewed by RN Care Coordinator.    Background: Patient was seen last week for follow-up regarding erythrocytosis.    Assessment: At clinic visit his labs show improvement of hemoglobin.    Plan/Recommendations: Patient will be seen again in 6 months with labs.      Signature:  Rosita Arroyo  RN Care Coordinator  [facility]

## 2024-07-16 ENCOUNTER — LAB (OUTPATIENT)
Dept: INFUSION THERAPY | Facility: HOSPITAL | Age: 79
End: 2024-07-16
Attending: INTERNAL MEDICINE
Payer: COMMERCIAL

## 2024-07-16 DIAGNOSIS — D75.1 ERYTHROCYTOSIS: Primary | ICD-10-CM

## 2024-07-16 DIAGNOSIS — G47.33 OSA (OBSTRUCTIVE SLEEP APNEA): ICD-10-CM

## 2024-07-16 LAB
HCT VFR BLD AUTO: 45.3 % (ref 40–53)
HGB BLD-MCNC: 15.6 G/DL (ref 13.3–17.7)

## 2024-07-16 PROCEDURE — 85014 HEMATOCRIT: CPT | Performed by: INTERNAL MEDICINE

## 2024-07-16 PROCEDURE — 36415 COLL VENOUS BLD VENIPUNCTURE: CPT | Performed by: INTERNAL MEDICINE

## 2024-07-16 RX ORDER — HEPARIN SODIUM,PORCINE 10 UNIT/ML
5-20 VIAL (ML) INTRAVENOUS DAILY PRN
OUTPATIENT
Start: 2024-07-16

## 2024-07-16 RX ORDER — HEPARIN SODIUM (PORCINE) LOCK FLUSH IV SOLN 100 UNIT/ML 100 UNIT/ML
5 SOLUTION INTRAVENOUS
OUTPATIENT
Start: 2024-07-16

## 2024-07-16 NOTE — PROGRESS NOTES
Sarkis arrived ambulatory after having labs drawn on second floor. His hematocrit today is 45.3 so does not meet parameters for phlebotomy today. He has a doctor's appointment and labs scheduled 7/30/24 with possible phlebotomy.

## 2024-07-17 ENCOUNTER — PATIENT OUTREACH (OUTPATIENT)
Dept: ONCOLOGY | Facility: HOSPITAL | Age: 79
End: 2024-07-17
Payer: COMMERCIAL

## 2024-07-17 NOTE — PROGRESS NOTES
[facility]: Cancer Care                                                                                            Situation: Patient chart reviewed by care coordinator.    Background: Patient with a diagnosis of erythrocytosis.    Assessment: Patient was in the clinic on 7/16.  After the encounter he had with the infusion nurse, my manager reached out to me and asked that I follow-up with the patient after speaking with Dr Lagunas.    Dr Lagunas reviewed patient's chart and states that patient does not need to be coming every 2 weeks for lab and possible phlebotomy.  He reviewed the labs from 7/16 and states that patient can come back to the clinic in 3 months for a lab only and then keep the appointment with Laura Oropeza CNP on 12/31 for lab and NP visit.    Plan/Recommendations: Call placed to patient to go over exactly what we are seeing him for.  I discussed with him that since his labs are now in the normal range and have come down from what they were roughly 3 years ago, he does not need a phlebotomy at this time.  We are monitoring to make sure those levels stay in the normal range and that we do not have to perform a phlebotomy if they start increasing.  Patient verbalized understanding.    I went ahead and canceled his appointment on 7/30 with Dr. Friedell as well as his every 2-week lab draws through mid August.  Lab only appointment has been made for October 9 at 2:00 PM.  He will then keep his appointment on 12/31 for lab and Laura Oropeza CNP.    This information is being routed over to the RN care coordinator.    Signature:  Chanda Porras RN

## 2024-08-15 ENCOUNTER — OFFICE VISIT (OUTPATIENT)
Dept: AUDIOLOGY | Facility: CLINIC | Age: 79
End: 2024-08-15
Payer: COMMERCIAL

## 2024-08-15 ENCOUNTER — OFFICE VISIT (OUTPATIENT)
Dept: FAMILY MEDICINE | Facility: CLINIC | Age: 79
End: 2024-08-15
Payer: COMMERCIAL

## 2024-08-15 VITALS
BODY MASS INDEX: 31.85 KG/M2 | SYSTOLIC BLOOD PRESSURE: 144 MMHG | OXYGEN SATURATION: 91 % | TEMPERATURE: 98.1 F | RESPIRATION RATE: 18 BRPM | WEIGHT: 187 LBS | DIASTOLIC BLOOD PRESSURE: 89 MMHG | HEART RATE: 103 BPM

## 2024-08-15 DIAGNOSIS — H91.93 BILATERAL HEARING LOSS, UNSPECIFIED HEARING LOSS TYPE: ICD-10-CM

## 2024-08-15 DIAGNOSIS — H61.23 BILATERAL IMPACTED CERUMEN: Primary | ICD-10-CM

## 2024-08-15 PROCEDURE — 99213 OFFICE O/P EST LOW 20 MIN: CPT | Performed by: FAMILY MEDICINE

## 2024-08-15 PROCEDURE — V5299 HEARING SERVICE: HCPCS | Performed by: AUDIOLOGIST

## 2024-08-15 NOTE — PROGRESS NOTES
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 He is interested in obtaining amplification. Mr. Joyner was obviously hard of hearing today; communication with him was challenging.    Otoscopy revealed significant complete/near complete cerumen impactions, bilaterally. Patient indicated use of cotton swabs in ears. Testing was discontinued until cerumen can be safely removed by a physician.     Patient planned to follow up with walk-in care for cerumen management. Hearing evaluation may be rescheduled once ears are free from debris. No charge appointment today. Patient expressed verbal understanding of this information and plan.    Cielo Gannon, [facility]  [place] Licensed Audiologist [id number]

## 2024-08-15 NOTE — PROGRESS NOTES
Assessment:       Bilateral impacted cerumen           Plan:     Patient with bilateral impacted cerumen.  Ear lavage performed by support staff with removal of cerumen from both ear canals with excellent resolution of symptoms.  Follow-up if symptoms return.      Subjective:       79 year old male presents for evaluation 1 week history of feeling like his ears are very plugged and he has decreased hearing.  He suspects he may have earwax blocking his ear canals.  Denies ear pain or drainage.  Tinnitus or vertigo.    Patient Active Problem List   Diagnosis    Hypogonadism, male    MAUDE (obstructive sleep apnea)    Essential hypertension    Abdominal pain    Abnormal weight gain    Allergic rhinitis    Bloating    BPH (benign prostatic hyperplasia)    Cervicalgia    Contact dermatitis and other eczema, due to unspecified cause    Diabetes mellitus, type 2 (H)    Dietary counseling and surveillance    Disorder of bursae and tendons in shoulder region    Erythrocytosis    Acquired polycythemia    Hallux limitus of left foot    Hallux valgus    Hypercholesterolemia    Mild intermittent asthma    Morbid obesity (H)    Post-traumatic stress disorder    Psychosexual dysfunction with inhibited sexual excitement       Past Medical History:   Diagnosis Date    Benign positional vertigo     Cellulitis and abscess of leg Feb. 2012    hospitalized at [facility] for treatment    Conductive hearing loss     Hearing problem     Hypertension     Hypogonadism, male     Obstructive sleep apnea     MAUDE (obstructive sleep apnea)     Problem, psychiatric     Recurrent otitis media     Tinnitus     Uncomplicated asthma        Past Surgical History:   Procedure Laterality Date    ENT SURGERY      HC REMOVAL OF TONSILS,<15 Y/O      Description: Tonsillectomy;  Recorded: 07/21/2011;    [facility] APPENDECTOMY      Description: Appendectomy;  Recorded: 09/11/2008;       Current Outpatient Medications   Medication Sig Dispense Refill     "amLODIPine (NORVASC) 5 MG tablet Take 5 mg by mouth daily      aspirin (ASA) 81 MG EC tablet Take 81 mg by mouth      buPROPion (WELLBUTRIN XL) 300 MG 24 hr tablet Take 300 mg by mouth every morning      celecoxib (CELEBREX) 200 MG capsule       gabapentin (NEURONTIN) 300 MG capsule TAKE 1 CAPSULE BY MOUTH THREE TIMES A DAY (Patient taking differently: Take 300 mg by mouth as needed) 90 capsule 3    gemfibrozil (LOPID) 600 MG tablet Take 600 mg by mouth daily      glimepiride (AMARYL) 1 MG tablet Take 1 mg by mouth daily      JARDIANCE 25 MG TABS tablet Take 25 mg by mouth daily      losartan (COZAAR) 25 MG tablet Take 25 mg by mouth daily      metFORMIN (GLUCOPHAGE XR) 500 MG 24 hr tablet Take 1,500 mg by mouth daily (with dinner)      Mometasone Furo-Formoterol Fum (DULERA IN) Inhale 2 puffs into the lungs 2 times daily      Multiple Vitamins-Minerals (MULTIVITAMIN ADULT, MINERALS,) TABS Take 1 tablet by mouth daily      Needle, Disp, 20G X 1\" MISC every 14 days. To be used to draw up testosterone 30 each 1    Needle, Disp, 23G X 1\" MISC every 14 days. To be used to inject medication testosterone 30 each 1    Syringe, Disposable, 1 ML MISC every 14 days. To use with injections 30 each 1    tadalafil (CIALIS) 20 MG tablet TAKE 1 TABLET BY MOUTH EVERY DAY AS NEEDED 5 tablet PRN    testosterone cypionate (DEPOTESTOSTERONE) 200 MG/ML injection       testosterone enanthate (DELATESTRYL) 200 MG/ML injection Inject  into the muscle. Inject 150 mg (0.75 ml) IM every 2 weeks. (Patient taking differently: Inject 150 mg (0.75 ml) IM every 6 months.) 5 mL 3     No current facility-administered medications for this visit.       No Known Allergies    Family History   Problem Relation Age of Onset    Psychotic Disorder Sister         vulnerable adult    Cerebrovascular Disease Mother        Social History     Socioeconomic History    Marital status:      Spouse name: None    Number of children: None    Years of education: " None    Highest education level: None   Tobacco Use    Smoking status: Former     Types: Cigars     Start date: 2000     Quit date: 2003     Years since quittin.9    Smokeless tobacco: Never     Social Determinants of Health      Received from [facility], [facility]    Financial Resource Strain    Received from [facility], [facility]    Social Connections   Interpersonal Safety: Low Risk  (3/11/2024)    Interpersonal Safety     Do you feel physically and emotionally safe where you currently live?: Yes     Within the past 12 months, have you been hit, slapped, kicked or otherwise physically hurt by someone?: No     Within the past 12 months, have you been humiliated or emotionally abused in other ways by your partner or ex-partner?: No         Review of Systems  Pertinent items are noted in HPI.      Objective:     BP (!) 144/89 (BP Location: Right arm, Patient Position: Sitting, Cuff Size: Adult Large)   Pulse 103   Temp 98.1  F (36.7  C) (Oral)   Resp 18   Wt 84.8 kg (187 lb)   SpO2 91%   BMI 31.85 kg/m       General appearance: alert, appears stated age, and cooperative  Ears: Bilateral cerumen impaction.  Ear lavage performed and ears reexamined.  Earwax successfully removed and ear canals are now patent with normal TMs bilaterally.          This note has been dictated using voice recognition software. Any grammatical or context distortions are unintentional and inherent to the software  g

## 2024-08-27 ENCOUNTER — OFFICE VISIT (OUTPATIENT)
Dept: FAMILY MEDICINE | Facility: CLINIC | Age: 79
End: 2024-08-27
Payer: COMMERCIAL

## 2024-08-27 VITALS
TEMPERATURE: 98 F | HEIGHT: 64 IN | WEIGHT: 187 LBS | DIASTOLIC BLOOD PRESSURE: 78 MMHG | OXYGEN SATURATION: 97 % | HEART RATE: 77 BPM | RESPIRATION RATE: 18 BRPM | SYSTOLIC BLOOD PRESSURE: 148 MMHG | BODY MASS INDEX: 31.92 KG/M2

## 2024-08-27 DIAGNOSIS — E11.65 TYPE 2 DIABETES MELLITUS WITH HYPERGLYCEMIA, WITHOUT LONG-TERM CURRENT USE OF INSULIN (H): ICD-10-CM

## 2024-08-27 DIAGNOSIS — M79.89 MASS OF SOFT TISSUE OF NECK: ICD-10-CM

## 2024-08-27 DIAGNOSIS — G51.0 BELL'S PALSY: Primary | ICD-10-CM

## 2024-08-27 LAB
FASTING STATUS PATIENT QL REPORTED: YES
GLUCOSE SERPL-MCNC: 318 MG/DL (ref 70–99)
HOLD SPECIMEN: NORMAL

## 2024-08-27 PROCEDURE — 99214 OFFICE O/P EST MOD 30 MIN: CPT | Performed by: FAMILY MEDICINE

## 2024-08-27 PROCEDURE — 82947 ASSAY GLUCOSE BLOOD QUANT: CPT | Performed by: FAMILY MEDICINE

## 2024-08-27 PROCEDURE — 86618 LYME DISEASE ANTIBODY: CPT | Performed by: FAMILY MEDICINE

## 2024-08-27 PROCEDURE — 36415 COLL VENOUS BLD VENIPUNCTURE: CPT | Performed by: FAMILY MEDICINE

## 2024-08-27 NOTE — PROGRESS NOTES
"Steven Joyner  BP (!) 148/78   Pulse 77   Temp 98  F (36.7  C)   Resp 18   Ht 1.632 m (5' 4.25\")   Wt 84.8 kg (187 lb)   SpO2 97%   BMI 31.85 kg/m       Assessment/Plan:                Sarkis was seen today for recheck medication and diabetes.    Diagnoses and all orders for this visit:    Bell's palsy  -     LYME DISEASE TOTAL ANTIBODIES WITH REFLEX TO CONFIRMATION  -     Extra Tube; Future  -     Extra Tube    Type 2 diabetes mellitus with hyperglycemia, without long-term current use of insulin (H)  -     Glucose  -     Adult Diabetes Education [facility] Referral; Future  -     Extra Tube; Future  -     Extra Tube  -     blood glucose monitoring (NO BRAND SPECIFIED) meter device kit; Use to test blood sugar 1 times daily or as directed.  -     blood glucose (NO BRAND SPECIFIED) test strip; Use to test blood sugar 1 times daily or as directed.  -     blood glucose (NO BRAND SPECIFIED) lancets standard; Use to test blood sugar 1 times daily or as directed.         DISCUSSION  I do believe he has typical Bell's palsy.  There is nothing unusual about it that I think warrants an imaging study with MRI scan at this time.  He has actually had some improvement which I think is optimistic for a quicker and more complete recovery.  I do not think there is any worth now more than 7 days out at least and possibly closer to 2 weeks from the onset of symptoms of using prednisone especially with recent concerns with hyperglycemia with his diabetes.  We will check and make sure he does not have any sign of Lyme disease.  Will hold off on any other antiviral medications at this point especially given there has been some noted improvement.  I did talk to him about this extensively.  I talked to them about the potential for more significant causes of symptoms but again I think this fits more typically with Bell's palsy and does not warrant definitively an imaging study especially given the amount of time and his examination " findings otherwise.  He is in agreement with this course of action.  If there are any progression of symptoms that are new or worsening in any regard I think it would warrant emergent evaluation and he understands this.      Today we will also check his blood sugar to make sure there are not concerns he is agreeable to seeing a diabetic nurse educator I think he would benefit tremendously from this also he is willing to begin checking his blood sugars as he states his wife does this will send a prescription for meter and test strips.    He brings up that he has a firm lump in the right side posterior neck.  States it has been there about 6 years seemingly unchanged.  Feels smooth and round.  Possibilities would include cystic-like structure versus a large lymph node.  Given its stability likely low concern but discussed evaluating further with ultrasound.  Subjective:     HPI:    Steven Joyner is a 79 year old male has a complex medical history that includes hypertension, dyslipidemia, type 2 diabetes management of chronology with recent concerns for significant hyperglycemia, obstructive sleep apnea not currently treated likely ongoing symptoms, hypogonadism on testosterone replacement, elevated hemoglobin with recent visits with hematology.    Patient is here today reporting concerns with neurologic functioning of his face.  He reports to me about 2 weeks ago he developed paralysis of the right side of his face.  He states his eye droops his mouth droops he could not talk his eye closed completely at night.  He has not sought evaluation for this until this point in time.  He reports to me this was 2 weeks ago but 2 weeks ago he was seen by 2 healthcare providers for a hearing evaluation and earwax removal he states it was not going on at that time so we think it might be just over a week or so that this has been going on.  He does report that it started somewhat suddenly.  He denies any other neurologic  dysfunction other than that located on his face.  He does not report significant numbness but it might feel slightly numb.  Does not report significant sound concerns.  He does state to me that since it started actually seems to be improving and he can close his eye a bit more although not completely he can talk more easily at this point.  He is not reporting difficulty with balance.  He does not report any numbness or tingling elsewhere in his body.  He has never had a similar concern prior.    He had a recent visit with endocrinology.  He had significant hyperglycemia with a weight of 578.  Unclear if he was taking his medications he does report to me is taking medications currently.  He has lost weight seemingly unexpectedly her weight does not reflect that but it is somewhat of a concern I suspect weight loss is probably from hyperglycemia.    ROS:  Complete review of systems is obtained.  Other than the specific considerations noted above complete review of systems is negative.          Objective:   Medications:  Current Outpatient Medications   Medication Sig Dispense Refill    amLODIPine (NORVASC) 5 MG tablet Take 5 mg by mouth daily      aspirin (ASA) 81 MG EC tablet Take 81 mg by mouth      blood glucose (NO BRAND SPECIFIED) lancets standard Use to test blood sugar 1 times daily or as directed. 100 Lancet 3    blood glucose (NO BRAND SPECIFIED) test strip Use to test blood sugar 1 times daily or as directed. 100 strip 3    blood glucose monitoring (NO BRAND SPECIFIED) meter device kit Use to test blood sugar 1 times daily or as directed. 1 kit 0    buPROPion (WELLBUTRIN XL) 300 MG 24 hr tablet Take 300 mg by mouth every morning      celecoxib (CELEBREX) 200 MG capsule       gabapentin (NEURONTIN) 300 MG capsule TAKE 1 CAPSULE BY MOUTH THREE TIMES A DAY (Patient taking differently: Take 300 mg by mouth as needed.) 90 capsule 3    gemfibrozil (LOPID) 600 MG tablet Take 600 mg by mouth daily      glimepiride  "(AMARYL) 1 MG tablet Take 1 mg by mouth daily      JARDIANCE 25 MG TABS tablet Take 25 mg by mouth daily      losartan (COZAAR) 25 MG tablet Take 25 mg by mouth daily      metFORMIN (GLUCOPHAGE XR) 500 MG 24 hr tablet Take 1,500 mg by mouth daily (with dinner)      Mometasone Furo-Formoterol Fum (DULERA IN) Inhale 2 puffs into the lungs 2 times daily      Multiple Vitamins-Minerals (MULTIVITAMIN ADULT, MINERALS,) TABS Take 1 tablet by mouth daily      Needle, Disp, 20G X 1\" MISC every 14 days. To be used to draw up testosterone 30 each 1    Needle, Disp, 23G X 1\" MISC every 14 days. To be used to inject medication testosterone 30 each 1    Syringe, Disposable, 1 ML MISC every 14 days. To use with injections 30 each 1    tadalafil (CIALIS) 20 MG tablet TAKE 1 TABLET BY MOUTH EVERY DAY AS NEEDED 5 tablet PRN    testosterone cypionate (DEPOTESTOSTERONE) 200 MG/ML injection       testosterone enanthate (DELATESTRYL) 200 MG/ML injection Inject  into the muscle. Inject 150 mg (0.75 ml) IM every 2 weeks. (Patient taking differently: Inject into the muscle. Inject 150 mg (0.75 ml) IM every 6 months.) 5 mL 3     No current facility-administered medications for this visit.        Allergies:   No Known Allergies     Social History     Socioeconomic History    Marital status:      Spouse name: Not on file    Number of children: Not on file    Years of education: Not on file    Highest education level: Not on file   Occupational History    Not on file   Tobacco Use    Smoking status: Former     Types: Cigars     Start date: 2000     Quit date: 2003     Years since quittin.9    Smokeless tobacco: Never   Substance and Sexual Activity    Alcohol use: Not on file    Drug use: Not on file    Sexual activity: Not on file   Other Topics Concern    Parent/sibling w/ CABG, MI or angioplasty before 65F 55M? Not Asked   Social History Narrative    Not on file     Social Determinants of Health     Financial Resource " Strain: High Risk (12/30/2021)    Received from [facility], [facility]    Financial Resource Strain     Difficulty of Paying Living Expenses: Not on file     Difficulty of Paying Living Expenses: Not on file   Food Insecurity: Not on file   Transportation Needs: Not on file   Physical Activity: Not on file   Stress: Not on file   Social Connections: Unknown (12/30/2021)    Received from [facility], [facility]    Social Connections     Frequency of Communication with Friends and Family: Not on file   Interpersonal Safety: Low Risk  (3/11/2024)    Interpersonal Safety     Do you feel physically and emotionally safe where you currently live?: Yes     Within the past 12 months, have you been hit, slapped, kicked or otherwise physically hurt by someone?: No     Within the past 12 months, have you been humiliated or emotionally abused in other ways by your partner or ex-partner?: No   Housing Stability: Not on file       Family History   Problem Relation Age of Onset    Psychotic Disorder Sister         vulnerable adult    Cerebrovascular Disease Mother         Most Recent Immunizations   Administered Date(s) Administered    COVID-19 12+ (2023-24) (Pfizer) 12/20/2023    COVID-19 MONOVALENT 12+ (Pfizer) 11/16/2021    COVID-19 Monovalent 12+ (Pfizer 2022) 04/15/2022    Flu, Unspecified 10/22/2014    Influenza (High Dose) 3 valent vaccine 02/09/2018    Influenza (IIV3) PF 10/22/2014    Influenza Vaccine 65+ (FLUAD) 11/22/2021    Influenza Vaccine 65+ (Fluzone HD) 12/20/2023    Pneumo Conj 13-V (2010&after) 10/22/2014    Pneumococcal 23 valent 07/23/2012    TD,PF 7+ (Tenivac) 05/15/2002    TDAP (Adacel,Boostrix) 08/03/2018    Td (Adult), Adsorbed 07/26/2005    Zoster vaccine, live 07/03/2012        Wt Readings from Last 3 Encounters:   08/27/24 84.8 kg (187 lb)   08/15/24 84.8 kg (187 lb)  "  07/02/24 85.7 kg (189 lb)        BP Readings from Last 6 Encounters:   08/27/24 (!) 148/78   08/15/24 (!) 144/89   07/02/24 137/82   07/03/24 129/79   06/04/24 (!) 141/87   03/11/24 128/68        Hemoglobin A1C   Date Value Ref Range Status   10/18/2022 7.6 (H) 0.0 - 5.6 % Final     Comment:     Normal <5.7%   Prediabetes 5.7-6.4%    Diabetes 6.5% or higher     Note: Adopted from ADA consensus guidelines.   04/16/2021 7.0 (H) <=5.6 % Final   08/06/2020 6.8 (H) <=5.6 % Final     Comment:     Prediabetes:   HBA1c       5.7 to 6.4%        Diabetes:        HBA1c        >=6.5%   Patients with Hgb F >5%, total bilirubin >10.0 mg/dL, abnormal red cell turnover, severe renal or hepatic disease or malignancy should not have this A1C method used to diagnose or monitor diabetes.                    PHYSICAL EXAM:    BP (!) 148/78   Pulse 77   Temp 98  F (36.7  C)   Resp 18   Ht 1.632 m (5' 4.25\")   Wt 84.8 kg (187 lb)   SpO2 97%   BMI 31.85 kg/m       General: Patient alert no signs of distress HEENT: There is facial paralysis noted on the right side loss of nasolabial fold there is a mouth droop there is no drooling I does not close completely.  There is no sign of significant conjunctival irritation.  Extract movements are intact.  Pupils are round and reactive.    There is no movement of the forehead on the right as compared to the left.    Patient reports symmetric sensation when touching side of the face.  He has some movement in terms of closure of his eye and movement of his mouth but not nearly the same as the other side.    There is a soft tissue mass in the right side posterior neck.  Measures about 1 cm x 0.5 cm smooth round seems to be free the mobile not adherent to the underlying tissue.    His heart and lung sound normal.  There is no sign of this arrhythmia or new murmur.    Further neurologic testing reveals he has good coordination does well with finger-nose-finger and different modalities as well as " finger tap.  No evidence of any strength deficit in the upper or lower extremities.  He has a negative Romberg and walks with an otherwise normal gait.        Answers submitted by the patient for this visit:  General Questionnaire (Submitted on 8/25/2024)  Chief Complaint: Chronic problems general questions HPI Form  How many days per week do you miss taking your medication?: 2  What makes it hard for you to take your medication every day?: remembering to take  General Concern (Submitted on 8/25/2024)  Chief Complaint: Chronic problems general questions HPI Form  What is the reason for your visit today?: Facial nerve paralysis  When did your symptoms begin?: 1-2 weeks ago  What are your symptoms?: eye won't close, side of mouth numb  How would you describe these symptoms?: Moderate  Are your symptoms:: Staying the same  Have you had these symptoms before?: No  Is there anything that makes you feel worse?: No  Is there anything that makes you feel better?: no

## 2024-08-28 LAB — B BURGDOR IGG+IGM SER QL: 0.06

## 2024-09-17 ENCOUNTER — ALLIED HEALTH/NURSE VISIT (OUTPATIENT)
Dept: EDUCATION SERVICES | Facility: CLINIC | Age: 79
End: 2024-09-17
Payer: COMMERCIAL

## 2024-09-17 DIAGNOSIS — E11.65 TYPE 2 DIABETES MELLITUS WITH HYPERGLYCEMIA, WITHOUT LONG-TERM CURRENT USE OF INSULIN (H): ICD-10-CM

## 2024-09-17 PROCEDURE — G0108 DIAB MANAGE TRN  PER INDIV: HCPCS | Performed by: DIETITIAN, REGISTERED

## 2024-09-17 RX ORDER — BLOOD-GLUCOSE SENSOR
EACH MISCELLANEOUS
Qty: 6 EACH | Refills: 3 | Status: SHIPPED | OUTPATIENT
Start: 2024-09-17 | End: 2024-10-03

## 2024-09-17 RX ORDER — KETOROLAC TROMETHAMINE 30 MG/ML
1 INJECTION, SOLUTION INTRAMUSCULAR; INTRAVENOUS ONCE
Qty: 1 EACH | Refills: 0 | Status: SHIPPED | OUTPATIENT
Start: 2024-09-17 | End: 2024-09-17

## 2024-09-17 NOTE — PATIENT INSTRUCTIONS
APPOINTMENT REMINDERS:    1. Eat 3 balanced meals each day - Monitor carbohydrate intake and aim for:  60-75 grams per meal or 4-5 carbohydrate choices ... 1 choice = 15 grams   For snacks, aim for 15-30 grams of carbohydrate or 1-2 choices.     2. Do not wait longer than 4-5 hours to eat something during awake hours. Try to include a protein source with each meal and snack - this has been shown to help with blood sugar elevations.  Simple protein choices: nuts, seeds, peanut butter, cheese stick, sliced cheese, cottage cheese, hard boiled egg, cold cut deli meat, Greek yogurt    3. Check your blood sugar 2 times per day (fasting, 2 hours after eating, before bedtime).  Your blood sugar goals:  mg/dL before eating and  mg/dL 2 hours after eating (or per your doctor).  Always be prepared to treat a low blood sugar (under 70 mg/dL) should it happen. Keep a sugar-containing beverage or food nearby.   Always bring your blood sugar log and meter to your diabetes-related appointments.    4. When to call your clinic:   Blood sugar over 400 mg/dL consistently,  You have 2 to 3 low blood sugars in a row,   Low readings the same time of day several days in a row,  Blood sugar is elevated and you can not get them down with your usual diabetes regimen,  You are ill and can't keep blood sugars controlled.     5. When to call 911: If your blood sugar does not get better with treatment, or if you/someone else is unable to give you treatment.    6. Incorporate 30 minutes of activity into most days of the week (150 minutes per week is ideal) - it does not need to be all at one time & walking counts!     7. Take diabetes medications as prescribed: continue 1500 mg Metformin XR (3 tablets), 25 mg Jardiance (1 tablet), 4 mg Amaryl (1 tablet) --> Take with food    Greek Yogurt Brands:  [employer]ikos Triple Zero  Dannon Lite and Fit  [employer]obani Zero Sugar      I truly appreciate your feedback on our appointment together. You will  either receive an email, text message, or mailing survey about today's appointment. Please fill this out at your soonest convenience so I can continue to improve upon my practice. Thank you!      Contact Information:  NIGEL Gautam, WENN, LD, [facility]  Diabetes     [phone]  Diabetes Education Care Team: [phone]

## 2024-09-17 NOTE — LETTER
"    9/17/2024         RE: Steven Joyner  [address] [place] [address]        Dear Colleague,    Thank you for referring your patient, Steven Joyner, to the [facility]. Please see a copy of my visit note below.    Diabetes Self-Management Education & Support    Presents for: Individual review    Type of Service: In Person Visit      ASSESSMENT:  Patient presents to clinic today to assess/assist with current diabetes self management skills. Patient arrived today unaccompanied. Patient asks \"do I have diabetes?\". Follows with Endo at [facility] with recent (8/12/24) Amaryl increase to 4 mg. Medications were reviewed and patient confirmed they are currently taking 1500 mg Metformin XR, 25 mg Jardiance, 4 mg Amaryl (he thinks) with no missed or skipped doses. Patient is not currently checking BG - \"what's the point?\". Was open to doing so after our discussion today. Prefers to try Coral CGM instead of finger stick checks. This was prescribed today. Patient notes he has gone down from 200+ lbs to 170+ lbs over the last 6-8 months. He attributes this to reducing weight lifting (was doing 2-3x/wk), muscle loss, fatigue, and going from eating 5505-3348 kcal/d to ~1800 kcal/d (per his estimates).     Intake was reviewed and patient is currently eating 2-3 meals and 0-1 snacks per day. See diet history below. Areas of opportunity include reducing SSB, reducing milk portions, reducing CHO portions at meals.   Activity is generally low at this time. Encouraged patient to do what they are able to tolerate while remaining safe.     DIET HISTORY:   Breakfast: eggs, Estonian rice (3-4 cups), beef, banana --> always milk (16 oz), 8 oz OJ  Lunch: may skip vs eat similar to dinner  Dinner: rice vs potato, beef / eggs / chicken // stir laguna veggies + meat --> always milk (16 oz), 8 oz OJ  Sweet: 1.5 cup ice cream (occ)  Beverages: water, milk, OJ, Pepsi (2-3x per week, small can)    Prior to " todays appointment I reviewed their most recent office visit notes as well as lab results.    Patient's most recent   Lab Results   Component Value Date    A1C 11.1 08/12/2024    A1C 7.6 10/18/2022    A1C 7.0 04/16/2021    A1C 6.8 08/06/2020    A1C 6.7 10/23/2019    A1C 6.1 01/10/2019    is not meeting goal of <7.0    Diabetes knowledge and skills assessment:   Patient is knowledgeable in diabetes management concepts related to: review in all    Continue education with the following diabetes management concepts: Healthy Eating, Being Active, Monitoring, Taking Medication, Problem Solving, Reducing Risks, and Healthy Coping    Based on learning assessment above, most appropriate setting for further diabetes education would be: Individual setting.      PLAN --> biggest goals include SMBG and watching CHO portions  1. Eat 3 balanced meals each day - Monitor carbohydrate intake and aim for:  60-75 grams per meal or 4-5 carbohydrate choices ... 1 choice = 15 grams   For snacks, aim for 15-30 grams of carbohydrate or 1-2 choices.     2. Do not wait longer than 4-5 hours to eat something during awake hours. Try to include a protein source with each meal and snack - this has been shown to help with blood sugar elevations.  Simple protein choices: nuts, seeds, peanut butter, cheese stick, sliced cheese, cottage cheese, hard boiled egg, cold cut deli meat, Greek yogurt    3. Check your blood sugar 2 times per day (fasting, 2 hours after eating, before bedtime).  Your blood sugar goals:  mg/dL before eating and  mg/dL 2 hours after eating (or per your doctor).  Always be prepared to treat a low blood sugar (under 70 mg/dL) should it happen. Keep a sugar-containing beverage or food nearby.   Always bring your blood sugar log and meter to your diabetes-related appointments.    4. Incorporate 30 minutes of activity into most days of the week (150 minutes per week is ideal) - it does not need to be all at one time &  "walking counts!     5. Take diabetes medications as prescribed: continue 1500 mg Metformin XR (3 tablets), 25 mg Jardiance (1 tablet), 4 mg Amaryl (1 tablet) --> Take with food    Topics to cover at upcoming visits: Healthy Eating, Being Active, Monitoring, Taking Medication, Problem Solving, Reducing Risks, and Healthy Coping    Follow-up: 1-2 months - patient to schedule     See Care Plan for co-developed, patient-state behavior change goals.  AVS provided for patient today.    Education Materials Provided:  Carbohydrate Counting and My Plate Planner    SUBJECTIVE/OBJECTIVE:  Presents for: Individual review  Accompanied by: Self  Diabetes education in the past 24mo: No  Focus of Visit: Patient Unsure  Diabetes type: Type 2  Date of diagnosis: 2018+  How confident are you filling out medical forms by yourself:: Quite a bit  Transportation concerns: No  Difficulty affording diabetes medication?: No  Difficulty affording diabetes testing supplies?: No  Other concerns:: [place] (Hard of hearing), Glasses  Cultural Influences/Ethnic Background:  Not  or     Diabetes Symptoms & Complications:  Diabetes Related Symptoms: Fatigue, Neuropathy, Polyuria (increased urination)  Weight trend: Decreasing  Symptom course: Improving       Patient Problem List and Family Medical History reviewed for relevant medical history, current medical status, and diabetes risk factors.    Vitals:  There were no vitals taken for this visit.  Estimated body mass index is 31.85 kg/m  as calculated from the following:    Height as of 8/27/24: 1.632 m (5' 4.25\").    Weight as of 8/27/24: 84.8 kg (187 lb).   Last 3 BP:   BP Readings from Last 3 Encounters:   08/27/24 (!) 148/78   08/15/24 (!) 144/89   07/02/24 137/82       History   Smoking Status     Former     Types: Cigars   Smokeless Tobacco     Never       Labs:  Lab Results   Component Value Date    A1C 7.6 10/18/2022     Lab Results   Component Value Date     08/27/2024    " " 07/08/2021     05/19/2011     Lab Results   Component Value Date    LDL 85 10/23/2019     04/27/2018     Direct Measure HDL   Date Value Ref Range Status   10/23/2019 30 (L) >=40 mg/dL Final     GFR Estimate   Date Value Ref Range Status   10/18/2022 71 >60 mL/min/1.73m2 Final     Comment:     Effective December 21, 2021 eGFRcr in adults is calculated using the 2021 CKD-EPI creatinine equation which includes age and gender (Sanjay et al., [place], DOI: 10.[facility]6/[email or web address])   07/08/2021 59 (L) >60 mL/min/1.73m2 Final   05/19/2011 >60 > - 60      GFR Estimate If Black   Date Value Ref Range Status   07/08/2021 >60 >60 mL/min/1.73m2 Final   05/19/2011 >60 > - 60      Lab Results   Component Value Date    CR 1.09 10/18/2022    CR 1.15 05/19/2011     No results found for: \"MICROALBUMIN\"    Healthy Eating:  Healthy Eating Assessed Today: Yes  Cultural/Episcopalian diet restrictions?: No  Do you have any food allergies or intolerances?: No  Meal planning/habits: None  Who cooks/prepares meals for you?: Self, Spouse  Who purchases food in  your home?: Self, Spouse  How many times a week on average do you eat food made away from home (restaurant/take-out)?: 0  Meals include: Breakfast, Lunch, Dinner, Evening Snack  Beverages: Water, Tea, Juice, Soda, Sports drinks, Milk  Has patient met with a dietitian in the past?: No    Being Active:  Being Active Assessed Today: Yes  Exercise:: Currently not exercising  Barrier to exercise: None    Monitoring:  Monitoring Assessed Today: Yes  Did patient bring glucose meter to appointment? : No  Blood Glucose Meter: Unknown  Times checking blood sugar at home (number): Never    Taking Medications:  Diabetes Medication(s)       Biguanides       metFORMIN (GLUCOPHAGE XR) 500 MG 24 hr tablet Take 1,500 mg by mouth daily (with dinner)       Sodium-Glucose Co-Transporter 2 (SGLT2) Inhibitors       JARDIANCE 25 MG TABS tablet Take 25 mg by mouth daily       Sulfonylureas       " glimepiride (AMARYL) 1 MG tablet Take 1 mg by mouth daily          Taking Medication Assessed Today: Yes  Current Treatments: Oral Medication (taken by mouth)  Problems taking diabetes medications regularly?: No  Diabetes medication side effects?: No    Problem Solving:  Problem Solving Assessed Today: Yes  Is the patient at risk for hypoglycemia?: Yes  Hypoglycemia Frequency: Never  Does patient have glucagon emergency kit?: Not Needed  Is the patient at risk for DKA?: No    Reducing Risks:  Reducing Risks Assessed Today: No  Has dilated eye exam at least once a year?: No  Sees dentist every 6 months?: No  Feet checked by healthcare provider in the last year?: No    Healthy Coping:  Healthy Coping Assessed Today: Yes  Emotional response to diabetes: Uncertain  Informal Support system:: None  Stage of change: CONTEMPLATION (Considering change and yet undecided)    Care Plan and Education Provided:  Healthy Eating: Balanced meals, Carbohydrate Counting, Consistency in amount and timing of carbohydrate intake, and Portion control, Being Active: Finding a physical activity routine that works for you and Relationship of activity to glucose, Monitoring: Blood glucose versus Continuous Glucose Monitoring, Frequency of monitoring, Individual glucose targets, and Purpose, Taking Medication: Action of prescribed medication(s) and When to take medication(s), Problem Solving: High glucose - causes, signs/symptoms, treatment and prevention and Low glucose - causes, signs/symptoms, treatment and prevention, Reducing Risks: Complications of diabetes and Goal for A1c, how it relates to glucose and how often to check, and Healthy Coping: Benefits of making appropriate lifestyle changes    Nallely Mcmanus, NIGEL, RDN, LD, CDCES  Diabetes     [phone]  Diabetes Education Care Team: [phone]    Time Spent: 60 minutes  Encounter Type: Individual    Any diabetes medication dose changes were made  via the CDE Protocol per the patient's referring provider. A copy of this encounter was shared with the provider.

## 2024-09-17 NOTE — PROGRESS NOTES
"Diabetes Self-Management Education & Support    Presents for: Individual review    Type of Service: In Person Visit      ASSESSMENT:  Patient presents to clinic today to assess/assist with current diabetes self management skills. Patient arrived today unaccompanied. Patient asks \"do I have diabetes?\". Follows with Endo at [facility] with recent (8/12/24) Amaryl increase to 4 mg. Medications were reviewed and patient confirmed they are currently taking 1500 mg Metformin XR, 25 mg Jardiance, 4 mg Amaryl (he thinks) with no missed or skipped doses. Patient is not currently checking BG - \"what's the point?\". Was open to doing so after our discussion today. Prefers to try Coral CGM instead of finger stick checks. This was prescribed today. Patient notes he has gone down from 200+ lbs to 170+ lbs over the last 6-8 months. He attributes this to reducing weight lifting (was doing 2-3x/wk), muscle loss, fatigue, and going from eating 3959-7416 kcal/d to ~1800 kcal/d (per his estimates).     Intake was reviewed and patient is currently eating 2-3 meals and 0-1 snacks per day. See diet history below. Areas of opportunity include reducing SSB, reducing milk portions, reducing CHO portions at meals.   Activity is generally low at this time. Encouraged patient to do what they are able to tolerate while remaining safe.     DIET HISTORY:   Breakfast: eggs, Portuguese rice (3-4 cups), beef, banana --> always milk (16 oz), 8 oz OJ  Lunch: may skip vs eat similar to dinner  Dinner: rice vs potato, beef / eggs / chicken // stir laguna veggies + meat --> always milk (16 oz), 8 oz OJ  Sweet: 1.5 cup ice cream (occ)  Beverages: water, milk, OJ, Pepsi (2-3x per week, small can)    Prior to todays appointment I reviewed their most recent office visit notes as well as lab results.    Patient's most recent   Lab Results   Component Value Date    A1C 11.1 08/12/2024    A1C 7.6 10/18/2022    A1C 7.0 04/16/2021    A1C 6.8 08/06/2020    A1C 6.7 10/23/2019 "    A1C 6.1 01/10/2019    is not meeting goal of <7.0    Diabetes knowledge and skills assessment:   Patient is knowledgeable in diabetes management concepts related to: review in all    Continue education with the following diabetes management concepts: Healthy Eating, Being Active, Monitoring, Taking Medication, Problem Solving, Reducing Risks, and Healthy Coping    Based on learning assessment above, most appropriate setting for further diabetes education would be: Individual setting.      PLAN --> biggest goals include SMBG and watching CHO portions  1. Eat 3 balanced meals each day - Monitor carbohydrate intake and aim for:  60-75 grams per meal or 4-5 carbohydrate choices ... 1 choice = 15 grams   For snacks, aim for 15-30 grams of carbohydrate or 1-2 choices.     2. Do not wait longer than 4-5 hours to eat something during awake hours. Try to include a protein source with each meal and snack - this has been shown to help with blood sugar elevations.  Simple protein choices: nuts, seeds, peanut butter, cheese stick, sliced cheese, cottage cheese, hard boiled egg, cold cut deli meat, Greek yogurt    3. Check your blood sugar 2 times per day (fasting, 2 hours after eating, before bedtime).  Your blood sugar goals:  mg/dL before eating and  mg/dL 2 hours after eating (or per your doctor).  Always be prepared to treat a low blood sugar (under 70 mg/dL) should it happen. Keep a sugar-containing beverage or food nearby.   Always bring your blood sugar log and meter to your diabetes-related appointments.    4. Incorporate 30 minutes of activity into most days of the week (150 minutes per week is ideal) - it does not need to be all at one time & walking counts!     5. Take diabetes medications as prescribed: continue 1500 mg Metformin XR (3 tablets), 25 mg Jardiance (1 tablet), 4 mg Amaryl (1 tablet) --> Take with food    Topics to cover at upcoming visits: Healthy Eating, Being Active, Monitoring, Taking  "Medication, Problem Solving, Reducing Risks, and Healthy Coping    Follow-up: 1-2 months - patient to schedule     See Care Plan for co-developed, patient-state behavior change goals.  AVS provided for patient today.    Education Materials Provided:  Carbohydrate Counting and My Plate Planner    SUBJECTIVE/OBJECTIVE:  Presents for: Individual review  Accompanied by: Self  Diabetes education in the past 24mo: No  Focus of Visit: Patient Unsure  Diabetes type: Type 2  Date of diagnosis: 2018+  How confident are you filling out medical forms by yourself:: Quite a bit  Transportation concerns: No  Difficulty affording diabetes medication?: No  Difficulty affording diabetes testing supplies?: No  Other concerns:: [place] (Hard of hearing), Glasses  Cultural Influences/Ethnic Background:  Not  or     Diabetes Symptoms & Complications:  Diabetes Related Symptoms: Fatigue, Neuropathy, Polyuria (increased urination)  Weight trend: Decreasing  Symptom course: Improving       Patient Problem List and Family Medical History reviewed for relevant medical history, current medical status, and diabetes risk factors.    Vitals:  There were no vitals taken for this visit.  Estimated body mass index is 31.85 kg/m  as calculated from the following:    Height as of 8/27/24: 1.632 m (5' 4.25\").    Weight as of 8/27/24: 84.8 kg (187 lb).   Last 3 BP:   BP Readings from Last 3 Encounters:   08/27/24 (!) 148/78   08/15/24 (!) 144/89   07/02/24 137/82       History   Smoking Status    Former    Types: Cigars   Smokeless Tobacco    Never       Labs:  Lab Results   Component Value Date    A1C 7.6 10/18/2022     Lab Results   Component Value Date     08/27/2024     07/08/2021     05/19/2011     Lab Results   Component Value Date    LDL 85 10/23/2019     04/27/2018     Direct Measure HDL   Date Value Ref Range Status   10/23/2019 30 (L) >=40 mg/dL Final     GFR Estimate   Date Value Ref Range Status " "  10/18/2022 71 >60 mL/min/1.73m2 Final     Comment:     Effective December 21, 2021 eGFRcr in adults is calculated using the 2021 CKD-EPI creatinine equation which includes age and gender (Sanjay heath al., NEM, DOI: 10.1056/QADKtn5967863)   07/08/2021 59 (L) >60 mL/min/1.73m2 Final   05/19/2011 >60 > - 60      GFR Estimate If Black   Date Value Ref Range Status   07/08/2021 >60 >60 mL/min/1.73m2 Final   05/19/2011 >60 > - 60      Lab Results   Component Value Date    CR 1.09 10/18/2022    CR 1.15 05/19/2011     No results found for: \"MICROALBUMIN\"    Healthy Eating:  Healthy Eating Assessed Today: Yes  Cultural/Jainism diet restrictions?: No  Do you have any food allergies or intolerances?: No  Meal planning/habits: None  Who cooks/prepares meals for you?: Self, Spouse  Who purchases food in  your home?: Self, Spouse  How many times a week on average do you eat food made away from home (restaurant/take-out)?: 0  Meals include: Breakfast, Lunch, Dinner, Evening Snack  Beverages: Water, Tea, Juice, Soda, Sports drinks, Milk  Has patient met with a dietitian in the past?: No    Being Active:  Being Active Assessed Today: Yes  Exercise:: Currently not exercising  Barrier to exercise: None    Monitoring:  Monitoring Assessed Today: Yes  Did patient bring glucose meter to appointment? : No  Blood Glucose Meter: Unknown  Times checking blood sugar at home (number): Never    Taking Medications:  Diabetes Medication(s)       Biguanides       metFORMIN (GLUCOPHAGE XR) 500 MG 24 hr tablet Take 1,500 mg by mouth daily (with dinner)       Sodium-Glucose Co-Transporter 2 (SGLT2) Inhibitors       JARDIANCE 25 MG TABS tablet Take 25 mg by mouth daily       Sulfonylureas       glimepiride (AMARYL) 1 MG tablet Take 1 mg by mouth daily          Taking Medication Assessed Today: Yes  Current Treatments: Oral Medication (taken by mouth)  Problems taking diabetes medications regularly?: No  Diabetes medication side effects?: " No    Problem Solving:  Problem Solving Assessed Today: Yes  Is the patient at risk for hypoglycemia?: Yes  Hypoglycemia Frequency: Never  Does patient have glucagon emergency kit?: Not Needed  Is the patient at risk for DKA?: No    Reducing Risks:  Reducing Risks Assessed Today: No  Has dilated eye exam at least once a year?: No  Sees dentist every 6 months?: No  Feet checked by healthcare provider in the last year?: No    Healthy Coping:  Healthy Coping Assessed Today: Yes  Emotional response to diabetes: Uncertain  Informal Support system:: None  Stage of change: CONTEMPLATION (Considering change and yet undecided)    Care Plan and Education Provided:  Healthy Eating: Balanced meals, Carbohydrate Counting, Consistency in amount and timing of carbohydrate intake, and Portion control, Being Active: Finding a physical activity routine that works for you and Relationship of activity to glucose, Monitoring: Blood glucose versus Continuous Glucose Monitoring, Frequency of monitoring, Individual glucose targets, and Purpose, Taking Medication: Action of prescribed medication(s) and When to take medication(s), Problem Solving: High glucose - causes, signs/symptoms, treatment and prevention and Low glucose - causes, signs/symptoms, treatment and prevention, Reducing Risks: Complications of diabetes and Goal for A1c, how it relates to glucose and how often to check, and Healthy Coping: Benefits of making appropriate lifestyle changes    Nallely Mcmanus, NIGEL, RDN, LD, [facility]  Diabetes     [phone]  Diabetes Education Care Team: [phone]    Time Spent: 60 minutes  Encounter Type: Individual    Any diabetes medication dose changes were made via the CDE Protocol per the patient's referring provider. A copy of this encounter was shared with the provider.

## 2024-09-23 ENCOUNTER — APPOINTMENT (OUTPATIENT)
Dept: PHYSICAL THERAPY | Facility: HOSPITAL | Age: 79
End: 2024-09-23
Attending: STUDENT IN AN ORGANIZED HEALTH CARE EDUCATION/TRAINING PROGRAM
Payer: COMMERCIAL

## 2024-09-23 ENCOUNTER — APPOINTMENT (OUTPATIENT)
Dept: CT IMAGING | Facility: HOSPITAL | Age: 79
End: 2024-09-23
Attending: EMERGENCY MEDICINE
Payer: COMMERCIAL

## 2024-09-23 ENCOUNTER — APPOINTMENT (OUTPATIENT)
Dept: MRI IMAGING | Facility: HOSPITAL | Age: 79
End: 2024-09-23
Attending: EMERGENCY MEDICINE
Payer: COMMERCIAL

## 2024-09-23 ENCOUNTER — HOSPITAL ENCOUNTER (OUTPATIENT)
Facility: HOSPITAL | Age: 79
Setting detail: OBSERVATION
Discharge: HOME | End: 2024-09-24
Attending: EMERGENCY MEDICINE | Admitting: STUDENT IN AN ORGANIZED HEALTH CARE EDUCATION/TRAINING PROGRAM
Payer: COMMERCIAL

## 2024-09-23 ENCOUNTER — APPOINTMENT (OUTPATIENT)
Dept: RADIOLOGY | Facility: HOSPITAL | Age: 79
End: 2024-09-23
Attending: EMERGENCY MEDICINE
Payer: COMMERCIAL
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
## 2024-09-23 DIAGNOSIS — R41.3 MEMORY LOSS, SHORT TERM: ICD-10-CM

## 2024-09-23 DIAGNOSIS — S09.90XA INJURY OF HEAD, INITIAL ENCOUNTER: ICD-10-CM

## 2024-09-23 DIAGNOSIS — R42 VERTIGO: ICD-10-CM

## 2024-09-23 LAB
ALBUMIN SERPL BCG-MCNC: 4.2 G/DL (ref 3.5–5.2)
ALBUMIN UR-MCNC: NEGATIVE MG/DL
ALP SERPL-CCNC: 109 U/L (ref 40–150)
ALT SERPL W P-5'-P-CCNC: 35 U/L (ref 0–70)
ANION GAP SERPL CALCULATED.3IONS-SCNC: 13 MMOL/L (ref 7–15)
APPEARANCE UR: CLEAR
AST SERPL W P-5'-P-CCNC: 31 U/L (ref 0–45)
BASOPHILS # BLD AUTO: 0.1 10E3/UL (ref 0–0.2)
BASOPHILS NFR BLD AUTO: 1 %
BILIRUB SERPL-MCNC: 0.5 MG/DL
BILIRUB UR QL STRIP: NEGATIVE
BUN SERPL-MCNC: 21.5 MG/DL (ref 8–23)
CALCIUM SERPL-MCNC: 9.1 MG/DL (ref 8.8–10.4)
CHLORIDE SERPL-SCNC: 103 MMOL/L (ref 98–107)
COLOR UR AUTO: COLORLESS
CREAT SERPL-MCNC: 0.68 MG/DL (ref 0.67–1.17)
EGFRCR SERPLBLD CKD-EPI 2021: >90 ML/MIN/1.73M2
EOSINOPHIL # BLD AUTO: 0.1 10E3/UL (ref 0–0.7)
EOSINOPHIL NFR BLD AUTO: 2 %
ERYTHROCYTE [DISTWIDTH] IN BLOOD BY AUTOMATED COUNT: 13.2 % (ref 10–15)
EST. AVERAGE GLUCOSE BLD GHB EST-MCNC: 260 MG/DL
GLUCOSE BLDC GLUCOMTR-MCNC: 143 MG/DL (ref 70–99)
GLUCOSE BLDC GLUCOMTR-MCNC: 171 MG/DL (ref 70–99)
GLUCOSE BLDC GLUCOMTR-MCNC: 264 MG/DL (ref 70–99)
GLUCOSE SERPL-MCNC: 176 MG/DL (ref 70–99)
GLUCOSE UR STRIP-MCNC: >1000 MG/DL
HBA1C MFR BLD: 10.7 %
HCO3 SERPL-SCNC: 23 MMOL/L (ref 22–29)
HCT VFR BLD AUTO: 49.6 % (ref 40–53)
HGB BLD-MCNC: 16.7 G/DL (ref 13.3–17.7)
HGB UR QL STRIP: NEGATIVE
IMM GRANULOCYTES # BLD: 0 10E3/UL
IMM GRANULOCYTES NFR BLD: 1 %
KETONES UR STRIP-MCNC: 10 MG/DL
LEUKOCYTE ESTERASE UR QL STRIP: NEGATIVE
LYMPHOCYTES # BLD AUTO: 1.8 10E3/UL (ref 0.8–5.3)
LYMPHOCYTES NFR BLD AUTO: 26 %
MCH RBC QN AUTO: 31.7 PG (ref 26.5–33)
MCHC RBC AUTO-ENTMCNC: 33.7 G/DL (ref 31.5–36.5)
MCV RBC AUTO: 94 FL (ref 78–100)
MONOCYTES # BLD AUTO: 0.8 10E3/UL (ref 0–1.3)
MONOCYTES NFR BLD AUTO: 12 %
MUCOUS THREADS #/AREA URNS LPF: PRESENT /LPF
NEUTROPHILS # BLD AUTO: 4 10E3/UL (ref 1.6–8.3)
NEUTROPHILS NFR BLD AUTO: 59 %
NITRATE UR QL: NEGATIVE
NRBC # BLD AUTO: 0 10E3/UL
NRBC BLD AUTO-RTO: 0 /100
PH UR STRIP: 5.5 [PH] (ref 5–7)
PLATELET # BLD AUTO: 185 10E3/UL (ref 150–450)
POTASSIUM SERPL-SCNC: 4.7 MMOL/L (ref 3.4–5.3)
PROT SERPL-MCNC: 7.2 G/DL (ref 6.4–8.3)
RBC # BLD AUTO: 5.27 10E6/UL (ref 4.4–5.9)
RBC URINE: <1 /HPF
SODIUM SERPL-SCNC: 139 MMOL/L (ref 135–145)
SP GR UR STRIP: 1.01 (ref 1–1.03)
TROPONIN T SERPL HS-MCNC: 19 NG/L
TSH SERPL DL<=0.005 MIU/L-ACNC: 1.82 UIU/ML (ref 0.3–4.2)
UROBILINOGEN UR STRIP-MCNC: <2 MG/DL
WBC # BLD AUTO: 6.9 10E3/UL (ref 4–11)
WBC URINE: 0 /HPF

## 2024-09-23 PROCEDURE — A9585 GADOBUTROL INJECTION: HCPCS | Performed by: EMERGENCY MEDICINE

## 2024-09-23 PROCEDURE — 82962 GLUCOSE BLOOD TEST: CPT

## 2024-09-23 PROCEDURE — 84443 ASSAY THYROID STIM HORMONE: CPT | Performed by: STUDENT IN AN ORGANIZED HEALTH CARE EDUCATION/TRAINING PROGRAM

## 2024-09-23 PROCEDURE — 250N000013 HC RX MED GY IP 250 OP 250 PS 637: Performed by: EMERGENCY MEDICINE

## 2024-09-23 PROCEDURE — 96361 HYDRATE IV INFUSION ADD-ON: CPT

## 2024-09-23 PROCEDURE — 258N000003 HC RX IP 258 OP 636: Performed by: STUDENT IN AN ORGANIZED HEALTH CARE EDUCATION/TRAINING PROGRAM

## 2024-09-23 PROCEDURE — 36415 COLL VENOUS BLD VENIPUNCTURE: CPT | Performed by: EMERGENCY MEDICINE

## 2024-09-23 PROCEDURE — 70551 MRI BRAIN STEM W/O DYE: CPT

## 2024-09-23 PROCEDURE — 99285 EMERGENCY DEPT VISIT HI MDM: CPT | Mod: 25

## 2024-09-23 PROCEDURE — 99223 1ST HOSP IP/OBS HIGH 75: CPT | Mod: AI | Performed by: STUDENT IN AN ORGANIZED HEALTH CARE EDUCATION/TRAINING PROGRAM

## 2024-09-23 PROCEDURE — G0378 HOSPITAL OBSERVATION PER HR: HCPCS

## 2024-09-23 PROCEDURE — 85025 COMPLETE CBC W/AUTO DIFF WBC: CPT | Performed by: EMERGENCY MEDICINE

## 2024-09-23 PROCEDURE — 82374 ASSAY BLOOD CARBON DIOXIDE: CPT | Performed by: EMERGENCY MEDICINE

## 2024-09-23 PROCEDURE — 250N000013 HC RX MED GY IP 250 OP 250 PS 637: Performed by: STUDENT IN AN ORGANIZED HEALTH CARE EDUCATION/TRAINING PROGRAM

## 2024-09-23 PROCEDURE — 84484 ASSAY OF TROPONIN QUANT: CPT | Performed by: EMERGENCY MEDICINE

## 2024-09-23 PROCEDURE — 70360 X-RAY EXAM OF NECK: CPT

## 2024-09-23 PROCEDURE — 81001 URINALYSIS AUTO W/SCOPE: CPT | Performed by: EMERGENCY MEDICINE

## 2024-09-23 PROCEDURE — 73030 X-RAY EXAM OF SHOULDER: CPT | Mod: LT

## 2024-09-23 PROCEDURE — 97530 THERAPEUTIC ACTIVITIES: CPT | Mod: GP

## 2024-09-23 PROCEDURE — 93005 ELECTROCARDIOGRAM TRACING: CPT | Performed by: EMERGENCY MEDICINE

## 2024-09-23 PROCEDURE — 72125 CT NECK SPINE W/O DYE: CPT

## 2024-09-23 PROCEDURE — 70450 CT HEAD/BRAIN W/O DYE: CPT

## 2024-09-23 PROCEDURE — 83036 HEMOGLOBIN GLYCOSYLATED A1C: CPT | Performed by: STUDENT IN AN ORGANIZED HEALTH CARE EDUCATION/TRAINING PROGRAM

## 2024-09-23 PROCEDURE — 97161 PT EVAL LOW COMPLEX 20 MIN: CPT | Mod: GP

## 2024-09-23 PROCEDURE — 70544 MR ANGIOGRAPHY HEAD W/O DYE: CPT | Mod: XU

## 2024-09-23 PROCEDURE — 99418 PROLNG IP/OBS E/M EA 15 MIN: CPT | Performed by: STUDENT IN AN ORGANIZED HEALTH CARE EDUCATION/TRAINING PROGRAM

## 2024-09-23 PROCEDURE — 96360 HYDRATION IV INFUSION INIT: CPT

## 2024-09-23 PROCEDURE — 250N000012 HC RX MED GY IP 250 OP 636 PS 637: Performed by: STUDENT IN AN ORGANIZED HEALTH CARE EDUCATION/TRAINING PROGRAM

## 2024-09-23 PROCEDURE — 255N000002 HC RX 255 OP 636: Performed by: EMERGENCY MEDICINE

## 2024-09-23 RX ORDER — ONDANSETRON 4 MG/1
4 TABLET, ORALLY DISINTEGRATING ORAL EVERY 6 HOURS PRN
Status: DISCONTINUED | OUTPATIENT
Start: 2024-09-23 | End: 2024-09-24 | Stop reason: HOSPADM

## 2024-09-23 RX ORDER — POLYETHYLENE GLYCOL 3350 17 G/17G
17 POWDER, FOR SOLUTION ORAL 2 TIMES DAILY PRN
Status: DISCONTINUED | OUTPATIENT
Start: 2024-09-23 | End: 2024-09-24 | Stop reason: HOSPADM

## 2024-09-23 RX ORDER — LOSARTAN POTASSIUM 50 MG/1
50 TABLET ORAL DAILY
Status: DISCONTINUED | OUTPATIENT
Start: 2024-09-23 | End: 2024-09-24 | Stop reason: HOSPADM

## 2024-09-23 RX ORDER — LOSARTAN POTASSIUM 50 MG/1
50 TABLET ORAL DAILY
COMMUNITY

## 2024-09-23 RX ORDER — GLIMEPIRIDE 4 MG/1
4 TABLET ORAL
COMMUNITY

## 2024-09-23 RX ORDER — DEXTROSE MONOHYDRATE 25 G/50ML
25-50 INJECTION, SOLUTION INTRAVENOUS
Status: DISCONTINUED | OUTPATIENT
Start: 2024-09-23 | End: 2024-09-24 | Stop reason: HOSPADM

## 2024-09-23 RX ORDER — METFORMIN HCL 500 MG
1500 TABLET, EXTENDED RELEASE 24 HR ORAL
Status: DISCONTINUED | OUTPATIENT
Start: 2024-09-23 | End: 2024-09-24 | Stop reason: HOSPADM

## 2024-09-23 RX ORDER — NICOTINE POLACRILEX 4 MG
15-30 LOZENGE BUCCAL
Status: DISCONTINUED | OUTPATIENT
Start: 2024-09-23 | End: 2024-09-24 | Stop reason: HOSPADM

## 2024-09-23 RX ORDER — AMLODIPINE BESYLATE 5 MG/1
5 TABLET ORAL DAILY
Status: DISCONTINUED | OUTPATIENT
Start: 2024-09-24 | End: 2024-09-24 | Stop reason: HOSPADM

## 2024-09-23 RX ORDER — GABAPENTIN 300 MG/1
300 CAPSULE ORAL 3 TIMES DAILY PRN
Status: DISCONTINUED | OUTPATIENT
Start: 2024-09-23 | End: 2024-09-24 | Stop reason: HOSPADM

## 2024-09-23 RX ORDER — GABAPENTIN 300 MG/1
300 CAPSULE ORAL 3 TIMES DAILY PRN
COMMUNITY

## 2024-09-23 RX ORDER — GADOBUTROL 604.72 MG/ML
10 INJECTION INTRAVENOUS ONCE
Status: DISCONTINUED | OUTPATIENT
Start: 2024-09-23 | End: 2024-09-23

## 2024-09-23 RX ORDER — ACETAMINOPHEN 325 MG/1
975 TABLET ORAL 3 TIMES DAILY PRN
Status: DISCONTINUED | OUTPATIENT
Start: 2024-09-23 | End: 2024-09-24 | Stop reason: HOSPADM

## 2024-09-23 RX ORDER — ONDANSETRON 2 MG/ML
4 INJECTION INTRAMUSCULAR; INTRAVENOUS EVERY 6 HOURS PRN
Status: DISCONTINUED | OUTPATIENT
Start: 2024-09-23 | End: 2024-09-24 | Stop reason: HOSPADM

## 2024-09-23 RX ORDER — AMOXICILLIN 250 MG
1 CAPSULE ORAL 2 TIMES DAILY PRN
Status: DISCONTINUED | OUTPATIENT
Start: 2024-09-23 | End: 2024-09-24 | Stop reason: HOSPADM

## 2024-09-23 RX ORDER — GLIMEPIRIDE 1 MG/1
4 TABLET ORAL
Status: DISCONTINUED | OUTPATIENT
Start: 2024-09-23 | End: 2024-09-24 | Stop reason: HOSPADM

## 2024-09-23 RX ORDER — TESTOSTERONE CYPIONATE 200 MG/ML
1 INJECTION, SOLUTION INTRAMUSCULAR
COMMUNITY

## 2024-09-23 RX ORDER — MECLIZINE HCL 12.5 MG 12.5 MG/1
25 TABLET ORAL ONCE
Status: COMPLETED | OUTPATIENT
Start: 2024-09-23 | End: 2024-09-23

## 2024-09-23 RX ORDER — AMOXICILLIN 250 MG
2 CAPSULE ORAL 2 TIMES DAILY PRN
Status: DISCONTINUED | OUTPATIENT
Start: 2024-09-23 | End: 2024-09-24 | Stop reason: HOSPADM

## 2024-09-23 RX ORDER — GEMFIBROZIL 600 MG/1
600 TABLET, FILM COATED ORAL DAILY
Status: DISCONTINUED | OUTPATIENT
Start: 2024-09-23 | End: 2024-09-24 | Stop reason: HOSPADM

## 2024-09-23 RX ADMIN — INSULIN ASPART 3 UNITS: 100 INJECTION, SOLUTION INTRAVENOUS; SUBCUTANEOUS at 16:41

## 2024-09-23 RX ADMIN — SODIUM CHLORIDE, POTASSIUM CHLORIDE, SODIUM LACTATE AND CALCIUM CHLORIDE 1000 ML: 600; 310; 30; 20 INJECTION, SOLUTION INTRAVENOUS at 12:50

## 2024-09-23 RX ADMIN — LOSARTAN POTASSIUM 50 MG: 50 TABLET, FILM COATED ORAL at 12:46

## 2024-09-23 RX ADMIN — MECLIZINE HYDROCHLORIDE 25 MG: 12.5 TABLET ORAL at 02:59

## 2024-09-23 RX ADMIN — GLIMEPIRIDE 4 MG: 1 TABLET ORAL at 12:46

## 2024-09-23 RX ADMIN — METFORMIN ER 500 MG 1500 MG: 500 TABLET ORAL at 16:40

## 2024-09-23 RX ADMIN — EMPAGLIFLOZIN 25 MG: 25 TABLET, FILM COATED ORAL at 12:46

## 2024-09-23 RX ADMIN — INSULIN ASPART 1 UNITS: 100 INJECTION, SOLUTION INTRAVENOUS; SUBCUTANEOUS at 14:59

## 2024-09-23 RX ADMIN — GEMFIBROZIL 600 MG: 600 TABLET ORAL at 12:46

## 2024-09-23 ASSESSMENT — ACTIVITIES OF DAILY LIVING (ADL)
ADLS_ACUITY_SCORE: 35
ADLS_ACUITY_SCORE: 35
ADLS_ACUITY_SCORE: 39
ADLS_ACUITY_SCORE: 35
ADLS_ACUITY_SCORE: 35
ADLS_ACUITY_SCORE: 41
ADLS_ACUITY_SCORE: 35
ADLS_ACUITY_SCORE: 41
ADLS_ACUITY_SCORE: 35
DEPENDENT_IADLS:: INDEPENDENT
ADLS_ACUITY_SCORE: 35
ADLS_ACUITY_SCORE: 39
ADLS_ACUITY_SCORE: 35
ADLS_ACUITY_SCORE: 39
ADLS_ACUITY_SCORE: 39
ADLS_ACUITY_SCORE: 41
ADLS_ACUITY_SCORE: 35
ADLS_ACUITY_SCORE: 39

## 2024-09-23 ASSESSMENT — COLUMBIA-SUICIDE SEVERITY RATING SCALE - C-SSRS
2. HAVE YOU ACTUALLY HAD ANY THOUGHTS OF KILLING YOURSELF IN THE PAST MONTH?: NO
1. IN THE PAST MONTH, HAVE YOU WISHED YOU WERE DEAD OR WISHED YOU COULD GO TO SLEEP AND NOT WAKE UP?: NO
6. HAVE YOU EVER DONE ANYTHING, STARTED TO DO ANYTHING, OR PREPARED TO DO ANYTHING TO END YOUR LIFE?: NO

## 2024-09-23 NOTE — ED NOTES
Pt reports that while lying in bed he has been having intermittent waves on dizziness. We did attempt to get up out of bed to use urinal for UA. Pt was able to slowly get himself up out of bed with rest in between position changes but did experience waves of dizziness during activity. Pt was able to stand up after sitting for a few minutes on the edge of the bed to use the urinal.    Unsteady gait; swaying to side when standing up; unsteady while standing. Still having dizziness with activity and movement.  UA collected and sent to lab.    Information above reported to Dr. Ann.

## 2024-09-23 NOTE — PHARMACY-ADMISSION MEDICATION HISTORY
"Pharmacist Admission Medication History    Admission medication history is complete. The information provided in this note is only as accurate as the sources available at the time of the update.    Information Source(s): Patient, Family member, and CareEverywhere/SureScripts via in-person    Pertinent Information: called wife bc ER notes mention some memory concerns but she reported pt takes care of his own meds and he has a pill box. Fill hx suggests compliance could be a concern.    Changes made to PTA medication list:  Added: None  Deleted: Wellbutrin, celebrex, asa 81 mg, sometimes pt takes a lot of body building OTC supplements but at this time he is just taking a MVI, Dulera - stopped all that kind of stuff  Changed: glimepiride, losartan, harvey to prn per pt    Allergies reviewed with patient and updates made in EHR: yes    Medication History Completed By: Maryan Koehler [facility] 9/23/2024 11:25 AM    PTA Med List   Medication Sig Note Last Dose    amLODIPine (NORVASC) 5 MG tablet Take 5 mg by mouth daily  9/22/2024 at ?    gabapentin (NEURONTIN) 300 MG capsule Take 300 mg by mouth 3 times daily as needed for neuropathic pain.  Unknown at ?    gemfibrozil (LOPID) 600 MG tablet Take 600 mg by mouth daily  9/22/2024 at ?    glimepiride (AMARYL) 4 MG tablet Take 4 mg by mouth every morning (before breakfast). 9/23/2024: Clinic \"I changed your glimepiride prescription from a 2 mg dose tablet to a 4 mg dose tablet. Start taking one 4 mg dose tablet every morning.\" But rx fill still have directions for 2 tabs daily and pt not really sure how much he is taking  9/22/2024 at ?    JARDIANCE 25 MG TABS tablet Take 25 mg by mouth daily  9/22/2024 at ?    losartan (COZAAR) 50 MG tablet Take 50 mg by mouth daily.  9/22/2024 at ?    metFORMIN (GLUCOPHAGE XR) 500 MG 24 hr tablet Take 1,500 mg by mouth daily (with dinner)  9/22/2024 at ?    Multiple Vitamins-Minerals (MULTIVITAMIN ADULT, MINERALS,) TABS Take 1 tablet by mouth " daily  9/22/2024 at ?    tadalafil (CIALIS) 20 MG tablet TAKE 1 TABLET BY MOUTH EVERY DAY AS NEEDED  Unknown at ?    testosterone cypionate (DEPOTESTOSTERONE) 200 MG/ML injection Inject 1 mL into the muscle every 14 days. 9/23/2024: Last filled Feb 2024 More than a month at maybe a few months ago

## 2024-09-23 NOTE — PROGRESS NOTES
PT Evaluation  [facility]  OUTPATIENT PHYSICAL THERAPY EVALUATION  PLAN OF TREATMENT FOR OUTPATIENT REHABILITATION  (COMPLETE FOR INITIAL CLAIMS ONLY)  Patient's Last Name, First Name, M.I.  YOB: 1945  Steven Joyner                        Provider's Name  [facility] Medical Record No.  [id number]                             Onset Date:  09/23/24   Start of Care Date:  09/23/24   Type:     _X_PT   ___OT   ___SLP Medical Diagnosis:  Vertigo              PT Diagnosis:  Impaired functional mobility Visits from SOC:  1     See note for plan of treatment, functional goals and certification details    I CERTIFY THE NEED FOR THESE SERVICES FURNISHED UNDER        THIS PLAN OF TREATMENT AND WHILE UNDER MY CARE     (Physician co-signature of this document indicates review and certification of the therapy plan).              09/23/24 7405   Appointment Info   Signing Clinician's Name / Credentials (PT) Shira Miguel PT, DPT   Quick Adds   Quick Adds Certification   Living Environment   People in Home spouse   Current Living Arrangements house   Home Accessibility stairs to enter home;stairs within home   Number of Stairs, Main Entrance 3   Stair Railings, Main Entrance other (see comments)  (rail 1 side)   Number of Stairs, Within Home, Primary six   Stair Railings, Within Home, Primary other (see comments)  (rail 1 side)   Transportation Anticipated family or friend will provide  (per wife, wife's friend will pick pt up)   Self-Care   Usual Activity Tolerance good   Current Activity Tolerance moderate   Equipment Currently Used at Home walker, rolling  (FWW - does not use)   Fall history within last six months yes   Number of times patient has fallen within last six months 1  (pt reporting he was cleaning his car, got his foot tangled in a rake, and he hit his head against his wife's car. Pt denying further falls.)   Activity/Exercise/Self-Care  "Comment IND with ADLs. Does not use AD. Denies having dizziness prior to this fall PTA. Per CM note, pt does driving, shopping, laundry, housekeeping for him and his wife since his wife has difficulties with mobility.   General Information   Onset of Illness/Injury or Date of Surgery 09/23/24   Referring Physician Gume Reyes MD   Patient/Family Therapy Goals Statement (PT) None stated   Pertinent History of Current Problem (include personal factors and/or comorbidities that impact the POC) Per chart: \"79 year old male with PMHx of DM2, HTN, HLD, who presents with dizziness after a mechanical fall and head strike.\"   Existing Precautions/Restrictions fall   Cognition   Affect/Mental Status (Cognition) WFL   Follows Commands (Cognition) follows one-step commands   Safety Deficit (Cognition) moderate deficit;insight into deficits/self-awareness;awareness of need for assistance;impulsivity   Pain Assessment   Patient Currently in Pain No   Posture    Posture Forward head position   Range of Motion (ROM)   Range of Motion ROM is WFL   Strength (Manual Muscle Testing)   Strength (Manual Muscle Testing) strength is WFL   Bed Mobility   Bed Mobility supine-sit-supine   Supine-Sit [place] (Bed Mobility) supervision   Supine-Sit-Supine [place] (Bed Mobility) supervision   Bed Mobility Limitations other (see comments)  (dizziness sitting up)   Impairments Contributing to Impaired Bed Mobility other (see comments)  (dizziness)   Assistive Device (Bed Mobility) bed rails   Comment, (Bed Mobility) SBA d/t pt dizziness   Transfers   Transfers sit-stand transfer   Transfer Safety Concerns Noted losing balance backward   Impairments Contributing to Impaired Transfers impaired balance   Sit-Stand Transfer   Sit-Stand [place] (Transfers) contact guard   Assistive Device (Sit-Stand Transfers) walker, front-wheeled   Gait/Stairs (Locomotion)   [place] Level (Gait) contact guard   Assistive Device (Gait) " walker, front-wheeled   Distance in Feet (Gait) 10'   Pattern (Gait) step-through   Deviations/Abnormal Patterns (Gait) efrain decreased;gait speed decreased   Comment, (Gait/Stairs) slightly unsteady on feet   Balance   Balance other (describe)   Balance Comments unsteady on feet when standing without AD, cga-Trish for safety in initial standing   Clinical Impression   Criteria for Skilled Therapeutic Intervention Yes, treatment indicated   PT Diagnosis (PT) Impaired functional mobility   Influenced by the following impairments balance impairment, dizziness   Functional limitations due to impairments transfers, gait   Clinical Presentation (PT Evaluation Complexity) evolving   Clinical Presentation Rationale clinical judgment   Clinical Decision Making (Complexity) low complexity   Planned Therapy Interventions (PT) balance training;gait training;home exercise program;neuromuscular re-education;patient/family education;stair training;strengthening;stretching;transfer training;progressive activity/exercise;home program guidelines   Risk & Benefits of therapy have been explained evaluation/treatment results reviewed;care plan/treatment goals reviewed;risks/benefits reviewed;participants included;patient   PT Total Evaluation Time   PT Eval, Low Complexity Minutes (98525) 6   Therapy Certification   Start of care date 09/23/24   Certification date from 09/23/24   Certification date to 09/30/24   Medical Diagnosis Vertigo   Physical Therapy Goals   PT Frequency 6x/week   PT Predicted Duration/Target Date for Goal Attainment 09/30/24   PT Goals Transfers;Gait;Stairs;Bed Mobility   PT: Bed Mobility Independent;Supine to/from sit   PT: Transfers Modified independent;Sit to/from stand;Bed to/from chair;Assistive device   PT: Gait Modified independent;Greater than 200 feet;Rolling walker   PT: Stairs 9 stairs;Supervision/stand-by assist  (1 handrail)   Interventions   Interventions Quick Adds Gait Training;Neuromuscular  Re-ed;Therapeutic Activity   Therapeutic Activity   Therapeutic Activities: dynamic activities to improve functional performance Minutes (36172) 9   Symptoms Noted During/After Treatment Dizziness   Treatment Detail/Skilled Intervention following orthostatics taken: supine - 137/84 mmHg, heart rate 91 bpm. Seated /72 mmHg, HR 95 bpm. Standing with /82 mmHg,  bpm. Pt symptomatic with report of dizziness with positional changes but orthostatics negative. Had pt trial cervical ext, L/R rotation and monitor dizziness - pt denies dizziness with ext but does report dizziness with B rotation, worse with rot to R. 2x10 sit<>stands without BUE use- 1st set from EOB, 2nd from recliner. dizziness improves with seated rest. Pt left sitting up in recliner, direct handoff to 1:1.   Gait Training   Gait Training Minutes (44256) 8   Symptoms Noted During/After Treatment (Gait Training) dizziness   Treatment Detail/Skilled Intervention with FWW CGA, cues for safe management of FWW and upright posture. Pt intermittently demoing safety concerns with FWW - ie picking up FWW on turns, sharp quick turns where pt has mild LOB requiring cga-Trish to maintain balance. Reports with increased dist amb that he feels less dizzy.   Distance in Feet 190'   [place] Level (Gait Training) contact guard   Assistive Device (Gait Training) rolling walker  (fww)   Gait Analysis Deviations decreased step length;decreased stride length;increased stride width   Impairments (Gait Analysis/Training) balance impaired;other (see comments)  (dizzy)   PT Discharge Planning   PT Plan monitor dizziness; transfers; balance ex; gait with vs without FWW if appropriate   PT Discharge Recommendation (DC Rec) Transitional Care Facility   PT Rationale for DC Rec Patient primarily being limited by dizziness and balance impairment - recommend patient have assistance with transfers and as he ambulates in order to decrease fall risk. Patient  additionally demonstrates some safety awareness deficits throughout session, placing him at higher fall risk. Recommend TCU at this time, as unclear if spouse would be able to provide this level of assistance to patient for his safety.   PT Brief overview of current status CGA with FWW   PT Equipment Needed at Discharge walker, rolling  (Pt has at home)   Total Session Time   Timed Code Treatment Minutes 17   Total Session Time (sum of timed and untimed services) 23

## 2024-09-23 NOTE — ED PROVIDER NOTES
5:12 AM - Patient signed out to me by Dr. Ann at routine shift change. 79 year old male with a couple falls tonight and room spinning dizziness. 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 Vertigo on exam with otherwise nonfocal neuro exam. Plan is follow up MRI/MRA head/neck & walk test. Please see Dr. Ann's note for details.    7:11 AM - Patient signed out to Dr. Argueta at routine shift change. Plan at this time is follow up MRI/MRA head/neck & walk test.    --------------------------------------------------------------------------------   --------------------------------------------------------------------------------     IMPRESSION  1. Fall at home, initial encounter    2. Injury of head, initial encounter    3. Vertigo        PLAN  - follow up MRI/MRA head/neck and reassess        Artemio Brown MD  09/23/24  Emergency Medicine  [facility] EMERGENCY DEPARTMENT  [address] [place] [id number] [phone]  Dept: [phone]     Artemio Brown MD  09/23/24 0738

## 2024-09-23 NOTE — ED PROVIDER NOTES
EMERGENCY DEPARTMENT ENCOUNTER      NAME: Steven Joyner  AGE: 79 year old male  YOB: 1945  MRN: [id number]  EVALUATION DATE & TIME: 2024  2:30 AM    PCP: Kashmir Jackson    ED PROVIDER: Trinidad Ann DO      Chief Complaint   Patient presents with    Dizziness    Shoulder Pain    Fall         FINAL IMPRESSION:  1. Fall at home, initial encounter    2. Injury of head, initial encounter    3. Vertigo          ED COURSE & MEDICAL DECISION MAKIN-year-old male presented to the ED for evaluation of room spinning dizziness and ataxia that occurred after he fell and hit his head earlier tonight.  Patient states that he fell because he tripped over something in his garage.  He reports that he hit his head but he does not feel that he lost consciousness.  The patient is not on any blood thinning medication.  The patient complained of left shoulder pain.  He denied any other trauma or injury.  Here in the ED the patient was hypertensive upon arrival.  He did not appear to be in any obvious distress or discomfort at the time of his initial evaluation.  On exam the patient was noted to have horizontal nystagmus to the left.  No other focal neurologic or cerebellar deficits were noted.  The patient also had a small left parietal scalp hematoma as well as mild tenderness palpation noted over the left AC joint.  There were no other signs of trauma or injury noted.    Following his initial evaluation the patient was given a dose of oral meclizine to treat his vertigo symptoms.  Of note, the patient has a history of benign paroxysmal positional vertigo in his chart.  However, the patient was not aware of this when he was asked about it.      An EKG was obtained which revealed normal sinus rhythm without any new or concerning ST or T wave changes.     The nursing staff contacted the patient's wife who stated that the patient actually fell twice tonight.  The patient initially fell in the garage and then had  a second fall in the bathroom which the patient failed to mention here in the ED at the time of his initial evaluation.      CT scan of the head does not show evidence of any intracranial bleeding or other acute traumatic injuries.  It did note an old cerebellar infarct.  CT scan of the cervical spine also did not show evidence of acute traumatic injury.  X-ray of the left shoulder did not show evidence of fracture or dislocation.    Given the fact that the patient experienced a second fall that was not reported, an additional workup was obtained.      CBC and CMP were both unremarkable.  Urinalysis did not show evidence of urinary tract infection.    Despite receiving the oral meclizine the patient was still quite unsteady on his feet when he attempted to stand to urinate.      The patient was reevaluated and informed of the initial lab and imaging results.  The patient was not aware of the old cerebellar infarct.  Given his persistent vertigo symptoms, repeated falls, and the fact that he has an old cerebellar infarct, an MRI was obtained to ensure that he did not experience an acute cerebellar infarct which is now causing his falls.     The patient's care was turned over to Dr. Brown who will follow-up on the pending MRI and troponin results and reevaluate the patient before determining ultimate disposition.        Pertinent Labs & Imaging studies reviewed. (See chart for details)  2:49 AM I met with the patient to gather history and to perform my initial exam. We discussed plans for the ED course, including diagnostic testing and treatment.       At the conclusion of the encounter I discussed the results of all of the tests and the disposition. The questions were answered. The patient or family acknowledged understanding and was agreeable with the care plan.     Medical Decision Making    History:  Supplemental history from: Documented in chart  External Record(s) reviewed: Documented in chart    Work  Up:  Chart documentation includes differential considered and any EKGs or imaging independently interpreted by provider, where specified.  In additional to work up documented, I considered the following work up: Documented in chart, if applicable.    External consultation:  Discussion of management with another provider: Documented in chart, if applicable    Complicating factors:  Care impacted by chronic illness: Hypertension and Other: Hearing problem  Care affected by social determinants of health: Access to Medical Care    Disposition considerations: Admission considered. Patient was signed out to the oncoming physician, disposition pending.      PPE worn: n95 mask, goggles    MEDICATIONS GIVEN IN THE EMERGENCY:  Medications   meclizine (ANTIVERT) tablet 25 mg (25 mg Oral $Given 9/23/24 0259)       NEW PRESCRIPTIONS STARTED AT TODAY'S ER VISIT  New Prescriptions    No medications on file          =================================================================    HPI    Patient information was obtained from: patient    Use of : N/A        Steven Joyner is a 79 year old male with a pertinent history of hypertension, MAUDE, diabetes type 2, and hypercholesterolemia who presents to this ED by EMS for evaluation of fall.    Patient states he fell in his garage tonight. When he fell he hit his head against the car door. He did not loose consciousness. He was able to get up on his own. About 30 min after the fall he started to get dizzy. He describes the dizziness as a room spinning sensation. He states he is unable to walk because he was so dizzy. Patient endorses left shoulder pain from the fall. He denies other injuries from the fall.     Patient denies being anticoagulated, chest pain, nausea, vomiting, and abdominal pain. There were no other complaints/concerns at this time.       REVIEW OF SYSTEMS   Review of Systems     PAST MEDICAL HISTORY:  Past Medical History:   Diagnosis Date    Benign  "positional vertigo     Cellulitis and abscess of leg Feb. 2012    hospitalized at [facility] for treatment    Conductive hearing loss     Hearing problem     Hypertension     Hypogonadism, male     Obstructive sleep apnea     MAUDE (obstructive sleep apnea)     Problem, psychiatric     Recurrent otitis media     Tinnitus     Uncomplicated asthma        PAST SURGICAL HISTORY:  Past Surgical History:   Procedure Laterality Date    ENT SURGERY      HC REMOVAL OF TONSILS,<13 Y/O      Description: Tonsillectomy;  Recorded: 07/21/2011;    ZZC APPENDECTOMY      Description: Appendectomy;  Recorded: 09/11/2008;           CURRENT MEDICATIONS:    amLODIPine (NORVASC) 5 MG tablet  aspirin (ASA) 81 MG EC tablet  blood glucose (NO BRAND SPECIFIED) lancets standard  blood glucose (NO BRAND SPECIFIED) test strip  blood glucose monitoring (NO BRAND SPECIFIED) meter device kit  buPROPion (WELLBUTRIN XL) 300 MG 24 hr tablet  celecoxib (CELEBREX) 200 MG capsule  Continuous Glucose Sensor (FREESTYLE GAGE 3 PLUS SENSOR) MISC  gabapentin (NEURONTIN) 300 MG capsule  gemfibrozil (LOPID) 600 MG tablet  glimepiride (AMARYL) 1 MG tablet  JARDIANCE 25 MG TABS tablet  losartan (COZAAR) 25 MG tablet  metFORMIN (GLUCOPHAGE XR) 500 MG 24 hr tablet  Mometasone Furo-Formoterol Fum (DULERA IN)  Multiple Vitamins-Minerals (MULTIVITAMIN ADULT, MINERALS,) TABS  Needle, Disp, 20G X 1\" MISC  Needle, Disp, 23G X 1\" MISC  Syringe, Disposable, 1 ML MISC  tadalafil (CIALIS) 20 MG tablet  testosterone cypionate (DEPOTESTOSTERONE) 200 MG/ML injection  testosterone enanthate (DELATESTRYL) 200 MG/ML injection        ALLERGIES:  No Known Allergies    FAMILY HISTORY:  Family History   Problem Relation Age of Onset    Psychotic Disorder Sister         vulnerable adult    Cerebrovascular Disease Mother        SOCIAL HISTORY:   Social History     Socioeconomic History    Marital status:      Spouse name: None    Number of children: None    Years of " "education: None    Highest education level: None   Tobacco Use    Smoking status: Former     Types: Cigars     Start date: 2000     Quit date: 2003     Years since quittin.0    Smokeless tobacco: Never     Social Determinants of Health      Received from [facility], [facility]    Financial Resource Strain    Received from [facility], [facility]    Social Connections   Interpersonal Safety: Low Risk  (3/11/2024)    Interpersonal Safety     Do you feel physically and emotionally safe where you currently live?: Yes     Within the past 12 months, have you been hit, slapped, kicked or otherwise physically hurt by someone?: No     Within the past 12 months, have you been humiliated or emotionally abused in other ways by your partner or ex-partner?: No       VITALS:  BP (!) 141/82   Pulse 87   Temp 98.1  F (36.7  C) (Oral)   Resp 21   Ht 1.651 m (5' 5\")   Wt 84.8 kg (187 lb)   SpO2 95%   BMI 31.12 kg/m      PHYSICAL EXAM    General Presentation: No apparent distress.  ENT: Ears atraumatic. Ear canals clear. Nose atraumatic/non-tender. No septal hematoma. Face/mandible non-tender. Oropharynx clear.  Eye: Pupils equal round and reactive to light. EOMFI. No conjunctival hemorrhage. No hyphema. Eye lids normal.  Horizontal nystagmus to the left.  Pulmonary: Spontaneous respiration. No respiratory distress. Clear equal breath sounds. Airway patent. Speech is normal. No stridor. Grossly stable chest wall. No crepitus. No chest wall tenderness to palpation noted.  Circulatory: Regular rate and rhythm. No murmurs, rubs, or gallops. Normal capillary refill.   Abdominal: Abdomen soft, non-tender and non-distended. No peritoneal signs. No flank tenderness. Normal bowel sounds. Pelvis stable/non-tender.   Neurologic: Alert and awake. Cranial nerves II-XII grossly intact.  No gross " motor deficit. No gross sensory deficit. Normal upper extremity and lower extremity strength. Jcarlos Coma Score 15.  Spine: No bony tenderness to palpation in the cervical spine, thoracic spine, lumbar spine, or sacrum.     Upper extremities:  Full range of motion.  Mild tenderness palpation noted over the left AC joint.    Pulses 2/4 bilateral upper extremities . No wounds, abrasions, lacerations, or contusions noted.   Lower extremities:  Full range of motion. No tenderness to palpation.  Pulses 2/4 bilateral lower extremities . No wounds, abrasions, lacerations, or contusions noted.   Skin: Small hematoma noted over the left parietal scalp.  No wounds, abrasions, lacerations, or contusions of the chest, back, abdomen, flank or pelvis.            LAB:  All pertinent labs reviewed and interpreted.  Results for orders placed or performed during the hospital encounter of 09/23/24   Head CT w/o contrast    Impression    IMPRESSION:  1.  No CT evidence for acute intracranial process.  2.  Brain atrophy, tiny old left cerebellar infarct, and presumed chronic microvascular ischemic changes as above.   CT Cervical Spine w/o Contrast    Impression    IMPRESSION:  1.  No fracture or posttraumatic subluxation.  2.  Degenerative changes with multilevel foraminal narrowing as described above.   XR Shoulder Left 2 Views    Impression    IMPRESSION: No fracture or dislocation. Moderate degenerative changes of the glenohumeral joint. Mild degenerative changes AC joint.   Comprehensive metabolic panel   Result Value Ref Range    Sodium 139 135 - 145 mmol/L    Potassium 4.7 3.4 - 5.3 mmol/L    Carbon Dioxide (CO2) 23 22 - 29 mmol/L    Anion Gap 13 7 - 15 mmol/L    Urea Nitrogen 21.5 8.0 - 23.0 mg/dL    Creatinine 0.68 0.67 - 1.17 mg/dL    GFR Estimate >90 >60 mL/min/1.73m2    Calcium 9.1 8.8 - 10.4 mg/dL    Chloride 103 98 - 107 mmol/L    Glucose 176 (H) 70 - 99 mg/dL    Alkaline Phosphatase 109 40 - 150 U/L    AST 31 0 - 45 U/L     ALT 35 0 - 70 U/L    Protein Total 7.2 6.4 - 8.3 g/dL    Albumin 4.2 3.5 - 5.2 g/dL    Bilirubin Total 0.5 <=1.2 mg/dL   UA with Microscopic reflex to Culture    Specimen: Urine, Clean Catch   Result Value Ref Range    Color Urine Colorless Colorless, Straw, Light Yellow, Yellow    Appearance Urine Clear Clear    Glucose Urine >1000 (A) Negative mg/dL    Bilirubin Urine Negative Negative    Ketones Urine 10 (A) Negative mg/dL    Specific Gravity Urine 1.015 1.001 - 1.030    Blood Urine Negative Negative    pH Urine 5.5 5.0 - 7.0    Protein Albumin Urine Negative Negative mg/dL    Urobilinogen Urine <2.0 <2.0 mg/dL    Nitrite Urine Negative Negative    Leukocyte Esterase Urine Negative Negative    Mucus Urine Present (A) None Seen /LPF    RBC Urine <1 <=2 /HPF    WBC Urine 0 <=5 /HPF   CBC with platelets and differential   Result Value Ref Range    WBC Count 6.9 4.0 - 11.0 10e3/uL    RBC Count 5.27 4.40 - 5.90 10e6/uL    Hemoglobin 16.7 13.3 - 17.7 g/dL    Hematocrit 49.6 40.0 - 53.0 %    MCV 94 78 - 100 fL    MCH 31.7 26.5 - 33.0 pg    MCHC 33.7 31.5 - 36.5 g/dL    RDW 13.2 10.0 - 15.0 %    Platelet Count 185 150 - 450 10e3/uL    % Neutrophils 59 %    % Lymphocytes 26 %    % Monocytes 12 %    % Eosinophils 2 %    % Basophils 1 %    % Immature Granulocytes 1 %    NRBCs per 100 WBC 0 <1 /100    Absolute Neutrophils 4.0 1.6 - 8.3 10e3/uL    Absolute Lymphocytes 1.8 0.8 - 5.3 10e3/uL    Absolute Monocytes 0.8 0.0 - 1.3 10e3/uL    Absolute Eosinophils 0.1 0.0 - 0.7 10e3/uL    Absolute Basophils 0.1 0.0 - 0.2 10e3/uL    Absolute Immature Granulocytes 0.0 <=0.4 10e3/uL    Absolute NRBCs 0.0 10e3/uL       RADIOLOGY:  Reviewed all pertinent imaging. Please see official radiology report.  CT Cervical Spine w/o Contrast   Final Result   IMPRESSION:   1.  No fracture or posttraumatic subluxation.   2.  Degenerative changes with multilevel foraminal narrowing as described above.      Head CT w/o contrast   Final Result    IMPRESSION:   1.  No CT evidence for acute intracranial process.   2.  Brain atrophy, tiny old left cerebellar infarct, and presumed chronic microvascular ischemic changes as above.      XR Shoulder Left 2 Views   Final Result   IMPRESSION: No fracture or dislocation. Moderate degenerative changes of the glenohumeral joint. Mild degenerative changes AC joint.      MR Brain w/o & w Contrast    (Results Pending)   MRA Brain ([place]) wo Contrast    (Results Pending)   MRA Neck (Carot[place]) wo & w Contrast    (Results Pending)       EKG:    Normal sinus rhythm.  Rate of 85.  Bifascicular block noted.  Normal QT.  No ST or T wave changes.  Compared to EKG on 5/22/2018 the bifascicular block appears new.  No other significant changes are noted.      I have independently reviewed and interpreted the EKG(s) documented above.        I, Rohit Davis , am serving as a scribe to document services personally performed by Trinidad Ann DO based on my observation and the provider's statements to me. I, Trinidad Ann, attest that Rohit Davis is acting in a scribe capacity, has observed my performance of the services and has documented them in accordance with my direction.    Trinidad Ann DO  Emergency Medicine  [facility] EMERGENCY DEPARTMENT  [facility] [address] [phone]       Trinidad Ann DO  09/23/24 1221

## 2024-09-23 NOTE — ED NOTES
"[facility] ED Handoff Report    ED Chief Complaint: Dizziness    ED Diagnosis:  (W19.XXXA,  Y92.009) Fall at home, initial encounter  Comment:   Plan:     (S09.90XA) Injury of head, initial encounter  Comment:   Plan:     (R42) Vertigo  Comment:   Plan:     (R41.3) Memory loss, short term  Comment:   Plan:        PMH:    Past Medical History:   Diagnosis Date    Benign positional vertigo     Cellulitis and abscess of leg Feb. 2012    hospitalized at [facility] for treatment    Conductive hearing loss     Hearing problem     Hypertension     Hypogonadism, male     Obstructive sleep apnea     MAUDE (obstructive sleep apnea)     Problem, psychiatric     Recurrent otitis media     Tinnitus     Uncomplicated asthma         Code Status:  Full Code     Falls Risk: Yes Band: Applied    Current Living Situation/Residence: lives with a significant other and lives in a house     Elimination Status: Continent: Yes     Activity Level: Independent - usually. Has had increased weakness/dizziness during stay. Will be an assist while in the hospital    Patients Preferred Language:  English     Needed: No    Vital Signs:  BP (!) 135/90   Pulse 82   Temp 98.1  F (36.7  C) (Oral)   Resp 12   Ht 1.651 m (5' 5\")   Wt 84.8 kg (187 lb)   SpO2 92%   BMI 31.12 kg/m       Cardiac Rhythm: Sinus    Pain Score: 0/10    Is the Patient Confused:  Yes    Last Food or Drink: 9/22/24     Focused Assessment:  Pt is alert x4 but also has intermittent confusion. Gets extremely dizzy when standing but doesn't always acknowledge it. CANNOT GO TO THE BATHROOM ALONE. 18G IV RAC.     Tests Performed: Done: Labs and Imaging    Treatments Provided:  See MAR    Family Dynamics/Concerns: No    Family Updated On Visitor Policy: Yes    Plan of Care Communicated to Family: Yes    Who Was Updated about Plan of Care: Lesley - wife - [phone]    Belongings Checklist Done and Signed by Patient: Yes    Belongings Sent with Patient: " glasses, boots, [employer] for credit cards, shorts    Medications sent with patient: Insulin, metformin    ,     Additional Information:     RN: Parth Montoya RN   9/23/2024 2:44 PM

## 2024-09-23 NOTE — ED TRIAGE NOTES
"Pt arrives via EMS from home for a fall 3 hours ago. EMS reports that the pt tripped and fell in his garage landing on his left side. Pt denies any LOC  but does report that he did hit his head when he fell. Pt is NOT on blood thinners. Pt c/o left shoulder \"discomfort\" and wants to make sure that it is not dislocated.     Pt reports that dizziness started about an hour ago prior to arrival and mostly when he is trying to get up to walk is the onset of the dizziness. Pt denies any neck pain or pain when palpated. Alert and oriented x4.     Triage Assessment (Adult)       Row Name 09/23/24 0235          Triage Assessment    Airway WDL WDL        Respiratory WDL    Respiratory WDL WDL        Skin Circulation/Temperature WDL    Skin Circulation/Temperature WDL WDL        Cardiac WDL    Cardiac WDL WDL        Peripheral/Neurovascular WDL    Peripheral Neurovascular WDL WDL        Cognitive/Neuro/Behavioral WDL    Cognitive/Neuro/Behavioral WDL WDL                     "

## 2024-09-23 NOTE — PLAN OF CARE
Goal Outcome Evaluation:      Plan of Care Reviewed With: patient, spouse          Outcome Evaluation: Likely home with wife at discharge.

## 2024-09-23 NOTE — PLAN OF CARE
PRIMARY DIAGNOSIS: VERTIGO    OUTPATIENT/OBSERVATION GOALS TO BE MET BEFORE DISCHARGE  1. Orthostatic performed: Yes:          Lying Orthostatic BP: 148/88         Sitting Orthostatic BP: 142/97         Standing Orthostatic BP: 135/90     2. Completion of appropriate imaging: Yes    3. Tolerating PO medications: Yes    4. Return to near baseline physical activity: No    5. Cleared for discharge by consultants (if involved): No    Discharge Planner Nurse   Safe discharge environment identified: No  Barriers to discharge: Yes    Patient is alert and orientated x 4.  C/O pain left shoulder 2-3/10.  Declines PRN Tylenol at this time.  Assist of 1, walker and GB with ambulating to restroom.  Vertigo is worse with activity.

## 2024-09-23 NOTE — ED NOTES
Called and advised wife that patient is being transferred to [facility]. Wife helped with admission note.
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
## 2024-09-23 NOTE — ED NOTES
"EMERGENCY DEPARTMENT SIGN OUT NOTE        ED COURSE AND MEDICAL DECISION MAKING  7:10 AM Patient was signed out to me by Dr Fernando Brown.  7:51 AM Responded to overhead page to provider because patient fell in the bathroom. He ambulated back to his room with assistance.  7:53 AM Rechecked patient.  Agitated and reported \"I do not want anyone poking me\".  Patient unable to clearly relate what it happened in the bathroom nor why he was in the emergency room.  Review of records indicate visit with his PCP on 8/27/2020 for multiple issues raise.  Greatest concern at that time was a Bell's palsy which appeared to be improving.  Patient also had significant hyperglycemia and uncertain as to whether or not patient was taking his medications.  No reports of confusion raised other than the Bell's palsy.  MRI/MRA results return.  No evidence of acute.  Infarct or other processes.  Mild evidence of microvascular disease.  Delta troponin pending.  Did discuss possibility of hospitalization given patient's inability to ambulate safely and recurrent falls.  Patient responded \"I will make that decision when I need to\"  8:28 AM.  Patient discussed with care manager.  He will discuss discharge planning with patient.  Patient informed of care manager visit.  He is agreeable  10 AM.  Received a call from patient's wife.  Informed her of patient's fall here and the recommendation for hospitalization.  Patient yet to be seen by care manager.  Informed wife she can expect a call from care manager to determine disposition   10:22 AM Talked to care manager regarding patient's wife's preferences.  10:24 AM Rechecked patient.  Patient is reluctantly agreeable for plans for hospitalization.  Call placed to the admitting physician.  10:45 AM Discussed with Dr. Reyes, hospitalist, who accepts patient for observation.  in brief, Steven Joyner is a 79 year old male who initially presented after falls with room-spinning dizziness.     At " time of sign out, disposition was pending MRI/MRA head/neck and walk test.     FINAL IMPRESSION    1. Fall at home, initial encounter    2. Injury of head, initial encounter    3. Vertigo    4.  Chronic short-term memory loss    ED MEDS  Medications   meclizine (ANTIVERT) tablet 25 mg (25 mg Oral $Given 9/23/24 9094)       LAB  Labs Ordered and Resulted from Time of ED Arrival to Time of ED Departure   COMPREHENSIVE METABOLIC PANEL - Abnormal       Result Value    Sodium 139      Potassium 4.7      Carbon Dioxide (CO2) 23      Anion Gap 13      Urea Nitrogen 21.5      Creatinine 0.68      GFR Estimate >90      Calcium 9.1      Chloride 103      Glucose 176 (*)     Alkaline Phosphatase 109      AST 31      ALT 35      Protein Total 7.2      Albumin 4.2      Bilirubin Total 0.5     ROUTINE UA WITH MICROSCOPIC REFLEX TO CULTURE - Abnormal    Color Urine Colorless      Appearance Urine Clear      Glucose Urine >1000 (*)     Bilirubin Urine Negative      Ketones Urine 10 (*)     Specific Gravity Urine 1.015      Blood Urine Negative      pH Urine 5.5      Protein Albumin Urine Negative      Urobilinogen Urine <2.0      Nitrite Urine Negative      Leukocyte Esterase Urine Negative      Mucus Urine Present (*)     RBC Urine <1      WBC Urine 0     TROPONIN T, HIGH SENSITIVITY - Normal    Troponin T, High Sensitivity 19     CBC WITH PLATELETS AND DIFFERENTIAL    WBC Count 6.9      RBC Count 5.27      Hemoglobin 16.7      Hematocrit 49.6      MCV 94      MCH 31.7      MCHC 33.7      RDW 13.2      Platelet Count 185      % Neutrophils 59      % Lymphocytes 26      % Monocytes 12      % Eosinophils 2      % Basophils 1      % Immature Granulocytes 1      NRBCs per 100 WBC 0      Absolute Neutrophils 4.0      Absolute Lymphocytes 1.8      Absolute Monocytes 0.8      Absolute Eosinophils 0.1      Absolute Basophils 0.1      Absolute Immature Granulocytes 0.0      Absolute NRBCs 0.0     TROPONIN T, HIGH SENSITIVITY            RADIOLOGY    MRA Brain ([place] [facility] [place]) wo Contrast   Final Result   IMPRESSION:   HEAD MRI:    1.  No acute infarct, mass, hemorrhage, or herniation.   2.  Mild diffuse parenchymal volume loss and white matter changes which are most likely due to chronic microvascular ischemic disease.   3.  Chronic left cerebellar infarct.      HEAD MRA:    1.  Normal MRA [place] [facility] [place].      MR Brain w/o Contrast   Final Result   IMPRESSION:   HEAD MRI:    1.  No acute infarct, mass, hemorrhage, or herniation.   2.  Mild diffuse parenchymal volume loss and white matter changes which are most likely due to chronic microvascular ischemic disease.   3.  Chronic left cerebellar infarct.      HEAD MRA:    1.  Normal MRA [place].      XR Neck Soft Tissue   Final Result   IMPRESSION: There are 3 punctate metallic foreign bodies within the subcutaneous soft tissues of the right posterior neck.                         CT Cervical Spine w/o Contrast   Final Result   IMPRESSION:   1.  No fracture or posttraumatic subluxation.   2.  Degenerative changes with multilevel foraminal narrowing as described above.      Head CT w/o contrast   Final Result   IMPRESSION:   1.  No CT evidence for acute intracranial process.   2.  Brain atrophy, tiny old left cerebellar infarct, and presumed chronic microvascular ischemic changes as above.      XR Shoulder Left 2 Views   Final Result   IMPRESSION: No fracture or dislocation. Moderate degenerative changes of the glenohumeral joint. Mild degenerative changes AC joint.      MRA Neck ([place]) wo & w Contrast    (Results Pending)       DISCHARGE MEDS  New Prescriptions    No medications on file     I, Yousif Radford, am serving as a scribe to document services personally performed by Bravo Argueta MD, based on my observations and the provider's statements to me.  I, Bravo Argueta MD, attest that Yousif Radford is acting in a scribe capacity, has observed my performance of the services and has  documented them in accordance with my direction.     Bravo Argueta MD  [facility] EMERGENCY DEPARTMENT  [facility] [facility] [id number] [phone]     Bravo Argueta MD  09/23/24 [id number]

## 2024-09-23 NOTE — ED NOTES
Pt stated he needs to get up and go the BR.  Pt assisted to BR with one assist and appeared steady with one. He refused my being in the room so sat pt down on the toliet and was outside the door.instructed pt to call when he was done. Was checking him frequently.  Suddenly I heard a thump on the door and I beliee pt got up, fell and hit head on the door.  Pt was awake after I immediately went in. Assisted back to bed with1 assist and dr. Anders checked pt out and all appeared ok.  Will continue to monitor.  Dr anders is going to admit him do to ongoing falls and stmissues and impulsivity.  1 to 1 sitter in room now for pt safety.

## 2024-09-23 NOTE — PLAN OF CARE
Patient admitted to room 04 at approximately 1515 via wheel chair from emergency room.  Reason for Admission: Falls/vertigo  Report received from: EMR  Patient was accompanied by transport.  Patient ambulated/transferred:  with one assist. self.  Patient is alert and orientated x 4.  Outpatient Observation education provided to: Patient  MDRO Education done if applicable NA  Safety risks were identified during admission:  fall.   Yellow risk/fall band applied:  Yes  Home meds sent home: NA  Home meds sent to pharmacy:YSABEL Harden RN

## 2024-09-23 NOTE — ED NOTES
"Handoff report given to NICO Tinoco.    Review blood work for trop with Alejandrina, looks like lab has reset it to \"needs redraw\" after writer called about results 2 hrs after blood work was sent. Alejandrina is aware and will change blood work to lab collect.  "
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
## 2024-09-23 NOTE — ED NOTES
Pt is forgetful and unsure about some medical history for MRI checklist. Writer spoke with pt's wife, Lesley over the phone to complete MRI checklist. Questions asked and answered.

## 2024-09-23 NOTE — ED NOTES
First trop drawn and sent earlier; redraw needed for green top so writer chase another green top and sent it to lab at 0436. Writer notes that it is time to draw rpt trop but first result has not been resulted in the pt's chart. Zaynab, lab staff reports that he will check on it and have staff release the result.    Pt is at MRI. Will collect rpt trop when the pt is back.

## 2024-09-23 NOTE — ED NOTES
Pt is alert. Repeating questions over and over. Cooperative. Wondering if he can go home. Talked with pt about that.

## 2024-09-23 NOTE — CONSULTS
"Care Management Initial Consult    General Information  Assessment completed with: Patient, Spouse or significant other, Sarkis and amberly Sutton via phone  Type of CM/SW Visit: Initial Assessment    Primary Care Provider verified and updated as needed: Yes   Readmission within the last 30 days: no previous admission in last 30 days      Reason for Consult: discharge planning  Advance Care Planning: Advance Care Planning Reviewed: no concerns identified          Communication Assessment  Patient's communication style: spoken language (English or Bilingual)          Living Environment:   People in home: spouse  Sarkis and wife Lesley  Current living Arrangements: house (\"I have steps to get inside and then steps I have to use inside. The laundry is in the basement\".)      Able to return to prior arrangements: yes       Family/Social Support:  Care provided by: self  Provides care for: spouse (\"my wife is overweight and has some medical needs. She has mobility issues. She needs my help around the house and to do the driving and shopping\".)  Marital Status:   Support system: Wife  Lesley       Description of Support System: Supportive, Involved    Support Assessment: Adequate family and caregiver support, Adequate social supports, Patient communicates needs well met    Current Resources:   Patient receiving home care services: No        Community Resources: Other (see comment) (\"Just met with the diabetes educator last week and they have a Lebra 3 ordered for him\".)  Equipment currently used at home: none  Supplies currently used at home: Other (\"glasses, He has tried the hearing aids in the past, but doesn't like them\".)    Employment/Financial:  Employment Status: retired, , previous service     Employment/ Comments: \"I was in [employer], but I don't use any of the benefits\".  Financial Concerns: none   Referral to Financial Worker: No       Does the patient's insurance plan have a 3 day qualifying " "hospital stay waiver?  No    Lifestyle & Psychosocial Needs:  Social Determinants of Health     Food Insecurity: Not on file   Depression: Not at risk (3/11/2024)    PHQ-2     PHQ-2 Score: 0   Housing Stability: Not on file   Tobacco Use: Medium Risk (9/23/2024)    Patient History     Smoking Tobacco Use: Former     Smokeless Tobacco Use: Never     Passive Exposure: Not on file   Financial Resource Strain: High Risk (12/30/2021)    Received from [facility], [facility]    Financial Resource Strain     Difficulty of Paying Living Expenses: Not on file     Difficulty of Paying Living Expenses: Not on file   Alcohol Use: Not on file   Transportation Needs: Not on file   Physical Activity: Not on file   Interpersonal Safety: Low Risk  (3/11/2024)    Interpersonal Safety     Do you feel physically and emotionally safe where you currently live?: Yes     Within the past 12 months, have you been hit, slapped, kicked or otherwise physically hurt by someone?: No     Within the past 12 months, have you been humiliated or emotionally abused in other ways by your partner or ex-partner?: No   Stress: Not on file   Social Connections: Unknown (12/30/2021)    Received from [facility], [facility]    Social Connections     Frequency of Communication with Friends and Family: Not on file   Health Literacy: Not on file       Functional Status:  Prior to admission patient needed assistance:   Dependent ADLs:: Independent, Ambulation-no assistive device (per wife, \"he is still a power weight  and never uses anything for mobility so this dizziness is new for him.\")  Dependent IADLs:: Independent (\"He does everything around the house because his wife cannot. He drives and wife cannot drive.\")  Assesssment of Functional Status: Not at baseline with mobility (\"he is falling and the dizziness is not new " "for him\".)    Mental Health Status:  Mental Health Status: No Current Concerns       Chemical Dependency Status:  Chemical Dependency Status: No Current Concerns             Values/Beliefs:  Spiritual, Cultural Beliefs, Restorationism Practices, Values that affect care: no               Discussed  Partnership in Safe Discharge Planning  document with patient/family: No    Additional Information:  Sarkis lives in a house with his wife Lesley. \"I have steps to get inside and then steps I have to use inside. The laundry is in the basement. I normally use the steps fine\".    He is the caregiver for his wife. \"My wife is overweight and has some medical needs and mobility issues. She needs my help around the house and to do the driving and shopping. I do all the laundry and housekeeping\". Wife Lesley states, \"I will be fine at home while he is in the hospital. He will worry about me and use that as an excuse to come home. But me and our new dog are fine here at home. I cannot stand for long at baseline. He normally does all the housework, but I care for my own ADLs.\"    He \"just met with the diabetes educator last week and they have a Lebra 3 ordered for him. Before this he has never had to check his blood sugars\".    Per wife Lesley, \"he is still a power weight  and never uses anything for mobility so this dizziness is new for him.\"    Per wife, \"I can have a friend of ours come and pick him up when he is ready for discharge.\"    SALVADOR was given and discussed. All questions were answered.    CM to follow for medical progression of care, discharge recommendations, and final discharge plan. Writer verified patient demographics and updated any changes needed in the patient chart.    Lesley wife [phone].    Next Steps:   Plan: Unknown hospital course at this time. He is currently on a 1:1.  Goal: Likely able to return home at discharge.    Gilda Tamayo RN    "

## 2024-09-23 NOTE — ED NOTES
[facility]  Expected date: 9/23/24  Expected time: 2:28 AM  Means of arrival: Ambulance  Comments:  [place], 79M, fall 3 hours ago, hit head, now dizzy

## 2024-09-23 NOTE — ED NOTES
"Pt's wife, Lesley calls to report timeline information regarding the pt's fall/falls.     Lesley reports that the pt has been having some ongoing memory problems in the past years but seems to be worsening the last 6 months.  Lesley reports that the first fall was yesterday 9/22 around 1930 to 2000 that did happen in the garage due to tripping over a rake. The pt fell and hit his head on the garage door but was able to get up.    The second fall is why she called EMS as the pt fell in the bathroom and then went to report to her that he is \"very dizzy\" and had fallen in the bathroom. Pt at that time the pt started c/o the shoulder pain to his wife; Lesley thinks that the fall in the bathroom is what may have caused the shoulder pain as the garage fall pt did not c/o of much pain.    Lesley reports that once everything has resulted, she would like an update and if the plan is for discharge they are able to facilitate that for him to get home safely.  Lesley's #[phone]    Information reported to Dr. Ann. SEE ORDERS.  "

## 2024-09-24 ENCOUNTER — APPOINTMENT (OUTPATIENT)
Dept: PHYSICAL THERAPY | Facility: HOSPITAL | Age: 79
End: 2024-09-24
Payer: COMMERCIAL

## 2024-09-24 VITALS
WEIGHT: 187 LBS | SYSTOLIC BLOOD PRESSURE: 139 MMHG | HEART RATE: 76 BPM | TEMPERATURE: 98 F | RESPIRATION RATE: 18 BRPM | HEIGHT: 65 IN | DIASTOLIC BLOOD PRESSURE: 85 MMHG | OXYGEN SATURATION: 95 % | BODY MASS INDEX: 31.16 KG/M2

## 2024-09-24 LAB
ANION GAP SERPL CALCULATED.3IONS-SCNC: 12 MMOL/L (ref 7–15)
BUN SERPL-MCNC: 15.9 MG/DL (ref 8–23)
CALCIUM SERPL-MCNC: 9.2 MG/DL (ref 8.8–10.4)
CHLORIDE SERPL-SCNC: 107 MMOL/L (ref 98–107)
CREAT SERPL-MCNC: 0.82 MG/DL (ref 0.67–1.17)
EGFRCR SERPLBLD CKD-EPI 2021: 90 ML/MIN/1.73M2
ERYTHROCYTE [DISTWIDTH] IN BLOOD BY AUTOMATED COUNT: 13.5 % (ref 10–15)
GLUCOSE BLDC GLUCOMTR-MCNC: 135 MG/DL (ref 70–99)
GLUCOSE BLDC GLUCOMTR-MCNC: 189 MG/DL (ref 70–99)
GLUCOSE SERPL-MCNC: 159 MG/DL (ref 70–99)
HCO3 SERPL-SCNC: 24 MMOL/L (ref 22–29)
HCT VFR BLD AUTO: 47.7 % (ref 40–53)
HGB BLD-MCNC: 15.8 G/DL (ref 13.3–17.7)
MCH RBC QN AUTO: 31.4 PG (ref 26.5–33)
MCHC RBC AUTO-ENTMCNC: 33.1 G/DL (ref 31.5–36.5)
MCV RBC AUTO: 95 FL (ref 78–100)
PLATELET # BLD AUTO: 181 10E3/UL (ref 150–450)
POTASSIUM SERPL-SCNC: 4.4 MMOL/L (ref 3.4–5.3)
RBC # BLD AUTO: 5.03 10E6/UL (ref 4.4–5.9)
SODIUM SERPL-SCNC: 143 MMOL/L (ref 135–145)
WBC # BLD AUTO: 5.9 10E3/UL (ref 4–11)

## 2024-09-24 PROCEDURE — 82962 GLUCOSE BLOOD TEST: CPT

## 2024-09-24 PROCEDURE — G0378 HOSPITAL OBSERVATION PER HR: HCPCS

## 2024-09-24 PROCEDURE — 36415 COLL VENOUS BLD VENIPUNCTURE: CPT | Performed by: STUDENT IN AN ORGANIZED HEALTH CARE EDUCATION/TRAINING PROGRAM

## 2024-09-24 PROCEDURE — 80048 BASIC METABOLIC PNL TOTAL CA: CPT | Performed by: STUDENT IN AN ORGANIZED HEALTH CARE EDUCATION/TRAINING PROGRAM

## 2024-09-24 PROCEDURE — 97110 THERAPEUTIC EXERCISES: CPT | Mod: GP

## 2024-09-24 PROCEDURE — 250N000013 HC RX MED GY IP 250 OP 250 PS 637: Performed by: STUDENT IN AN ORGANIZED HEALTH CARE EDUCATION/TRAINING PROGRAM

## 2024-09-24 PROCEDURE — 97116 GAIT TRAINING THERAPY: CPT | Mod: GP

## 2024-09-24 PROCEDURE — 85027 COMPLETE CBC AUTOMATED: CPT | Performed by: STUDENT IN AN ORGANIZED HEALTH CARE EDUCATION/TRAINING PROGRAM

## 2024-09-24 PROCEDURE — 99239 HOSP IP/OBS DSCHRG MGMT >30: CPT | Performed by: EMERGENCY MEDICINE

## 2024-09-24 RX ADMIN — LOSARTAN POTASSIUM 50 MG: 50 TABLET, FILM COATED ORAL at 08:15

## 2024-09-24 RX ADMIN — EMPAGLIFLOZIN 25 MG: 25 TABLET, FILM COATED ORAL at 08:16

## 2024-09-24 RX ADMIN — GLIMEPIRIDE 4 MG: 1 TABLET ORAL at 08:17

## 2024-09-24 RX ADMIN — GEMFIBROZIL 600 MG: 600 TABLET ORAL at 08:16

## 2024-09-24 RX ADMIN — ACETAMINOPHEN 975 MG: 325 TABLET ORAL at 08:14

## 2024-09-24 RX ADMIN — AMLODIPINE BESYLATE 5 MG: 5 TABLET ORAL at 08:15

## 2024-09-24 RX ADMIN — INSULIN ASPART 1 UNITS: 100 INJECTION, SOLUTION INTRAVENOUS; SUBCUTANEOUS at 08:18

## 2024-09-24 ASSESSMENT — ACTIVITIES OF DAILY LIVING (ADL)
ADLS_ACUITY_SCORE: 43
ADLS_ACUITY_SCORE: 41
ADLS_ACUITY_SCORE: 43
ADLS_ACUITY_SCORE: 39
ADLS_ACUITY_SCORE: 41

## 2024-09-24 NOTE — PLAN OF CARE
Physical Therapy Discharge Summary    Reason for therapy discharge:    Discharged to home.    Progress towards therapy goal(s). See goals on Care Plan in [facility] electronic health record for goal details.  Goals partially met.  Barriers to achieving goals:   discharge from facility.    Therapy recommendation(s):    Continued therapy is recommended.  Rationale/Recommendations:  Home PT recommended, family declined.    Paulina Mata, PT, DPT  9/24/2024

## 2024-09-24 NOTE — PLAN OF CARE
Goal Outcome Evaluation:      Plan of Care Reviewed With: patient    Pt A&O, forgetful. On 1:1 for safety. VSS in RA. Denies pain.  at bedtime. Ax1 with walker & gait belt.     Wendy Still RN

## 2024-09-24 NOTE — PROGRESS NOTES
PRIMARY DIAGNOSIS: VERTIGO    OUTPATIENT/OBSERVATION GOALS TO BE MET BEFORE DISCHARGE  1. Orthostatic performed: N/A    2. Completion of appropriate imaging: Yes    3. Tolerating PO medications: Yes    4. Return to near baseline physical activity: No    5. Cleared for discharge by consultants (if involved): No    Discharge Planner Nurse   Safe discharge environment identified: No  Barriers to discharge: Yes       Entered by: Wendy Still RN 09/24/2024 12:14 AM       Please review provider order for any additional goals.   Nurse to notify provider when observation goals have been met and patient is ready for discharge.

## 2024-09-24 NOTE — PROGRESS NOTES
"PRIMARY DIAGNOSIS: \"GENERIC\" NURSING  OUTPATIENT/OBSERVATION GOALS TO BE MET BEFORE DISCHARGE:  ADLs back to baseline: No    Activity and level of assistance: AX1 gait belt & walker    Pain status: Pain free.    Return to near baseline physical activity: No     Discharge Planner Nurse   Safe discharge environment identified: No  Barriers to discharge: Yes       Entered by: Wendy Still RN 09/24/2024 4:45 AM    Alert with intermittent confusion. Pt on 1:1 unsteady gait & tends to be impulsive when using commode,  1:1 bedside attendant for safety.      Please review provider order for any additional goals.   Nurse to notify provider when observation goals have been met and patient is ready for discharge.  "

## 2024-09-24 NOTE — PLAN OF CARE
Goal Outcome Evaluation:       AVS reviewed with patient. No medication changes. Pt discharging home with family. Pt assisted ODALIS via wheelchair and staff. All personal belongings sent with patient.

## 2024-09-24 NOTE — PLAN OF CARE
Goal Outcome Evaluation:      Plan of Care Reviewed With: patient    Overall Patient Progress: improvingOverall Patient Progress: improving    Pt alert & oriented with intermittent confusion. Denies pain. VSS in RA. 1:1 pt is unsteady & impulsive when tried to use bedside commode. Ax1 with walker & gait belt. Pt recommends TCU placement.     Wendy Still RN

## 2024-09-24 NOTE — PROGRESS NOTES
Care Management Discharge Note    Discharge Date: 09/24/2024       Discharge Disposition: Home    Discharge Services: home    Discharge DME: home    Discharge Transportation: family or friend will provide (per wife, wife's friend will pick pt up)    Private pay costs discussed: Not applicable    Education Provided on the Discharge Plan: yes    Persons Notified of Discharge Plans: yes  Patient/Family in Agreement with the Plan:  yes    Handoff Referral Completed: No, handoff not indicated or clinically appropriate    Additional Information:  Per therapy pt is safe to discharge home and believe that he would benefit from home care. SW placed call to spouse to relay said information. Spouse stated that she feels that her and family are able to meet his needs as pt has not benefited from home care in the past. She is aware that they can contact PCP if needs arise post discharge. Spouse's friend with transport and will beat hospital at 1100. All parties aware and agreeable to discharge plan.  9:18 AM    Brenna Kjellberg, BSW

## 2024-09-25 ENCOUNTER — PATIENT OUTREACH (OUTPATIENT)
Dept: FAMILY MEDICINE | Facility: CLINIC | Age: 79
End: 2024-09-25
Payer: COMMERCIAL

## 2024-09-25 NOTE — TELEPHONE ENCOUNTER
Transitions of Care Outreach  Chief Complaint   Patient presents with    Hospital F/U       Most Recent Admission Date: 9/23/2024   Most Recent Admission Diagnosis: Vertigo - R42  Memory loss, short term - R41.3  Injury of head, initial encounter - S09.90XA  Fall at home, initial encounter - W19.XXXA, Y92.009     Most Recent Discharge Date: 9/24/2024   Most Recent Discharge Diagnosis: Fall at home, initial encounter - W19.XXXA, Y92.009  Injury of head, initial encounter - S09.90XA  Vertigo - R42  Memory loss, short term - R41.3     Transitions of Care Assessment    Discharge Assessment  How are you doing now that you are home?: feeling ok  How are your symptoms? (Red Flag symptoms escalate to triage hotline per guidelines): Improved  Do you know how to contact your clinic care team if you have future questions or changes to your health status? : Yes  Does the patient have their discharge instructions? : Yes  Does the patient have questions regarding their discharge instructions? : No  Were you started on any new medications or were there changes to any of your previous medications? : No  Does the patient have all of their medications?: Yes  Do you have questions regarding any of your medications? : No  Do you have all of your needed medical supplies or equipment (DME)?  (i.e. oxygen tank, CPAP, cane, etc.): Yes    Follow up Plan     Discharge Follow-Up  Discharge follow up appointment scheduled in alignment with recommended follow up timeframe or Transitions of Risk Category? (Low = within 30 days; Moderate= within 14 days; High= within 7 days): No  Patient's follow up appointment not scheduled: Patient declined scheduling support. Education on the importance of transitions of care follow up. Provided scheduling phone number.    Future Appointments   Date Time Provider Department Center   10/3/2024  2:00 PM Mami Feliciano RPH [facility]   10/9/2024  2:00 PM [facility]    12/31/2024  2:00 [facility]   12/31/2024  2:30 PM Laura Oropeza, APRN CNP [facility] [place]       Outpatient Plan as outlined on AVS reviewed with patient.    For any urgent concerns, please contact our 24 hour nurse triage line: [phone] ([phone])       Raffy Rosario RN

## 2024-09-25 NOTE — DISCHARGE SUMMARY
[facility] MEDICINE  DISCHARGE SUMMARY     Primary Care Physician: Kashmir Jackson  Admission Date: 9/23/2024   Discharge Provider: Robert Tabor MD Discharge Date: 9/25/2024   Diet:   Active Diet and Nourishment Order   Procedures    Diet       Code Status: Prior   Activity: DCACTIVITY: Activity as tolerated        Condition at Discharge: Good     REASON FOR PRESENTATION(See Admission Note for Details)   Fall with TBI    PRINCIPAL & ACTIVE DISCHARGE DIAGNOSES     Active Problems:    Vertigo    Memory loss, short term    Injury of head, initial encounter    Fall at home, initial encounter      PENDING LABS     Unresulted Labs Ordered in the Past 30 Days of this Admission       No orders found from 8/24/2024 to 9/24/2024.              PROCEDURES ( this hospitalization only)          RECOMMENDATIONS TO OUTPATIENT PROVIDER FOR F/U VISIT     Follow-up Appointments     Follow-up and recommended labs and tests       Follow-up with primary MD within 1 week for recheck                DISPOSITION     Home    SUMMARY OF HOSPITAL COURSE:      Sarkis is a 73-year-old male with diabetes and hypertension who presents with dizziness after mechanical fall and head strike.  Head imaging showed no acute abnormalities.  On day of discharge she had no further vertigo and was cleared for discharge home by therapy but he and his wife declined home care.  At discharge she was medically stable with stable vital signs and no significant lab abnormalities and not receiving any type of IV medication or new medications or treatment and no further diagnostic testing indicated.    Discharge Medications with Med changes:     Discharge Medication List as of 9/24/2024 10:37 AM        CONTINUE these medications which have NOT CHANGED    Details   amLODIPine (NORVASC) 5 MG tablet Take 5 mg by mouth daily, Historical      gabapentin (NEURONTIN) 300 MG capsule Take 300 mg by mouth 3 times daily as needed for  "neuropathic pain., Historical      gemfibrozil (LOPID) 600 MG tablet Take 600 mg by mouth daily, Historical      glimepiride (AMARYL) 4 MG tablet Take 4 mg by mouth every morning (before breakfast)., Historical      JARDIANCE 25 MG TABS tablet Take 25 mg by mouth daily, PORSCHE, Historical      losartan (COZAAR) 50 MG tablet Take 50 mg by mouth daily., Historical      metFORMIN (GLUCOPHAGE XR) 500 MG 24 hr tablet Take 1,500 mg by mouth daily (with dinner), Historical      Multiple Vitamins-Minerals (MULTIVITAMIN ADULT, MINERALS,) TABS Take 1 tablet by mouth daily, Historical      tadalafil (CIALIS) 20 MG tablet TAKE 1 TABLET BY MOUTH EVERY DAY AS NEEDED, Disp-5 tablet, R-PRN, E-PrescribeDX Code Needed  NEED NEW SCRIPT.      testosterone cypionate (DEPOTESTOSTERONE) 200 MG/ML injection Inject 1 mL into the muscle every 14 days., Historical      blood glucose (NO BRAND SPECIFIED) lancets standard Use to test blood sugar 1 times daily or as directed.Disp-100 Lancet, [id number] to substitute based on insurance and/or patient preference.      blood glucose (NO BRAND SPECIFIED) test strip Use to test blood sugar 1 times daily or as directed., Disp-100 strip, R-3, E-PrescribeOkay to substitute based on insurance and/or patient preference.      blood glucose monitoring (NO BRAND SPECIFIED) meter device kit Use to test blood sugar 1 times daily or as directed.Disp-1 kit, [id number] to substitute based on insurance and/or patient preference.      Continuous Glucose Sensor (FREESTYLE GAGE 3 PLUS SENSOR) MISC Use 1 sensor every 15 days. Use to read blood sugars per 's instructions., Disp-6 each, R-3, E-Prescribe      Needle, Disp, 20G X 1\" MISC every 14 days. To be used to draw up testosterone, Disp-30 each, R-1, Historical      Needle, Disp, 23G X 1\" MISC every 14 days. To be used to inject medication testosterone, Disp-30 each, R-1, Historical      Syringe, Disposable, 1 ML MISC every 14 days. To use " "with injections, Disp-30 each, R-1, Historical                   Rationale for medication changes:              Consults       CARE MANAGEMENT / SOCIAL WORK IP CONSULT  PHYSICAL THERAPY ADULT IP CONSULT  OCCUPATIONAL THERAPY ADULT IP CONSULT    Immunizations given this encounter     Most Recent Immunizations   Administered Date(s) Administered    COVID-19 12+ (Pfizer) 12/20/2023    COVID-19 MONOVALENT 12+ (Pfizer) 11/16/2021    COVID-19 Monovalent 12+ (Pfizer 2022) 04/15/2022    Flu, Unspecified 10/22/2014    Influenza (High Dose) Trivalent,PF (Fluzone) 02/09/2018    Influenza (IIV3) PF 10/22/2014    Influenza Vaccine 65+ (FLUAD) 11/22/2021    Influenza Vaccine 65+ (Fluzone HD) 12/20/2023    Pneumo Conj 13-V (2010&after) 10/22/2014    Pneumococcal 23 valent 07/23/2012    TD,PF 7+ (Tenivac) 05/15/2002    TDAP (Adacel,Boostrix) 08/03/2018    Td (Adult), Adsorbed 07/26/2005    Zoster vaccine, live 07/03/2012           Anticoagulation Information      Recent INR results: No results for input(s): \"INR\" in the last 168 hours.  Warfarin doses (if applicable) or name of other anticoagulant:       SIGNIFICANT IMAGING FINDINGS     Results for orders placed or performed during the hospital encounter of 09/23/24   Head CT w/o contrast    Impression    IMPRESSION:  1.  No CT evidence for acute intracranial process.  2.  Brain atrophy, tiny old left cerebellar infarct, and presumed chronic microvascular ischemic changes as above.   CT Cervical Spine w/o Contrast    Impression    IMPRESSION:  1.  No fracture or posttraumatic subluxation.  2.  Degenerative changes with multilevel foraminal narrowing as described above.   XR Shoulder Left 2 Views    Impression    IMPRESSION: No fracture or dislocation. Moderate degenerative changes of the glenohumeral joint. Mild degenerative changes AC joint.   MRA Brain ([place]) wo Contrast    Impression    IMPRESSION:  HEAD MRI:   1.  No acute infarct, mass, hemorrhage, or " herniation.  2.  Mild diffuse parenchymal volume loss and white matter changes which are most likely due to chronic microvascular ischemic disease.  3.  Chronic left cerebellar infarct.    HEAD MRA:   1.  Normal MRA [place].   XR Neck Soft Tissue    Impression    IMPRESSION: There are 3 punctate metallic foreign bodies within the subcutaneous soft tissues of the right posterior neck.                 MR Brain w/o Contrast    Impression    IMPRESSION:  HEAD MRI:   1.  No acute infarct, mass, hemorrhage, or herniation.  2.  Mild diffuse parenchymal volume loss and white matter changes which are most likely due to chronic microvascular ischemic disease.  3.  Chronic left cerebellar infarct.    HEAD MRA:   1.  Normal MRA [place].       SIGNIFICANT LABORATORY FINDINGS     Most Recent 3 CBC's:  Recent Labs   Lab Test 09/24/24  0612 09/23/24  0351 07/16/24  1237 07/02/24  1240   WBC 5.9 6.9  --  5.4   HGB 15.8 16.7 15.6 16.4  16.4   MCV 95 94  --  95    185  --  196           Discharge Orders        Medication Therapy Management Referral      Reason for your hospital stay    concussion     Follow-up and recommended labs and tests     Follow-up with primary MD within 1 week for recheck     Activity    Your activity upon discharge: activity as tolerated     Diet    Follow this diet upon discharge: Current Diet:Orders Placed This Encounter      Regular Diet Adult       Examination   Physical Exam      Wt Readings from Last 1 Encounters:   09/23/24 84.8 kg (187 lb)       General: No apparent distress  Heart: Regular in rhythm  Lungs: Clear to auscultation      Please see EMR for more detailed significant labs, imaging, consultant notes etc.    I, Robert Tabor MD, personally saw the patient today and spent greater than 30 minutes discharging this patient.    Robert Tabor MD  [facility]    CC:Kashmir Jackson

## 2024-09-30 LAB
ATRIAL RATE - MUSE: 85 BPM
DIASTOLIC BLOOD PRESSURE - MUSE: 100 MMHG
INTERPRETATION ECG - MUSE: NORMAL
P AXIS - MUSE: 45 DEGREES
PR INTERVAL - MUSE: 176 MS
QRS DURATION - MUSE: 142 MS
QT - MUSE: 404 MS
QTC - MUSE: 480 MS
R AXIS - MUSE: -68 DEGREES
SYSTOLIC BLOOD PRESSURE - MUSE: 157 MMHG
T AXIS - MUSE: 40 DEGREES
VENTRICULAR RATE- MUSE: 85 BPM

## 2024-10-03 ENCOUNTER — VIRTUAL VISIT (OUTPATIENT)
Dept: PHARMACY | Facility: CLINIC | Age: 79
End: 2024-10-03
Attending: EMERGENCY MEDICINE
Payer: COMMERCIAL

## 2024-10-03 DIAGNOSIS — I10 ESSENTIAL HYPERTENSION: ICD-10-CM

## 2024-10-03 DIAGNOSIS — E78.00 HYPERCHOLESTEROLEMIA: ICD-10-CM

## 2024-10-03 DIAGNOSIS — Z78.9 TAKES DIETARY SUPPLEMENTS: ICD-10-CM

## 2024-10-03 DIAGNOSIS — F52.8 OTHER SEXUAL DYSFUNCTION NOT DUE TO A SUBSTANCE OR KNOWN PHYSIOLOGICAL CONDITION: ICD-10-CM

## 2024-10-03 DIAGNOSIS — D75.1 ERYTHROCYTOSIS: Primary | ICD-10-CM

## 2024-10-03 DIAGNOSIS — E11.42 TYPE 2 DIABETES MELLITUS WITH DIABETIC POLYNEUROPATHY, WITHOUT LONG-TERM CURRENT USE OF INSULIN (H): Primary | ICD-10-CM

## 2024-10-03 DIAGNOSIS — M54.2 CERVICALGIA: ICD-10-CM

## 2024-10-03 PROCEDURE — 99607 MTMS BY PHARM ADDL 15 MIN: CPT | Mod: 93 | Performed by: PHARMACIST

## 2024-10-03 PROCEDURE — 99605 MTMS BY PHARM NP 15 MIN: CPT | Mod: 93 | Performed by: PHARMACIST

## 2024-10-03 RX ORDER — KETOROLAC TROMETHAMINE 30 MG/ML
1 INJECTION, SOLUTION INTRAMUSCULAR; INTRAVENOUS ONCE
Qty: 1 EACH | Refills: 0 | Status: SHIPPED | OUTPATIENT
Start: 2024-10-03 | End: 2024-10-03

## 2024-10-03 RX ORDER — HYDROCHLOROTHIAZIDE 12.5 MG/1
CAPSULE ORAL
Qty: 6 EACH | Refills: 3 | Status: SHIPPED | OUTPATIENT
Start: 2024-10-03

## 2024-10-03 NOTE — LETTER
"_  Medication List        Prepared on: Oct 3, 2024     Bring your Medication List when you go to the doctor, hospital, or   emergency room. And, share it with your family or caregivers.     Note any changes to how you take your medications.  Cross out medications when you no longer use them.    Medication How I take it Why I use it Prescriber   amLODIPine (NORVASC) 5 MG tablet Take 5 mg by mouth daily  High blood pressure  Patient Reported   Continuous Glucose Sensor (FREESTYLE GAGE 3 PLUS SENSOR) MISC Use 1 sensor every 15 days. Use to read blood sugars per 's instructions. Type 2 diabetes mellitus with diabetic polyneuropathy, without long-term current use of insulin (H) Kashmir Jackson MD   gabapentin (NEURONTIN) 300 MG capsule Take 300 mg by mouth 3 times daily as needed for neuropathic pain.  Neuropathic pain Patient Reported   gemfibrozil (LOPID) 600 MG tablet Take 600 mg by mouth daily  High Cholesterol Patient Reported   glimepiride (AMARYL) 4 MG tablet Take 4 mg by mouth 2 times daily (before meals).  Type II diabetes  Patient Reported   JARDIANCE 25 MG TABS tablet Take 25 mg by mouth daily  Type II diabetes  Patient Reported   losartan (COZAAR) 50 MG tablet Take 50 mg by mouth daily.  High blood pressure  Patient Reported   metFORMIN (GLUCOPHAGE XR) 500 MG 24 hr tablet Take 1,500 mg by mouth daily (with dinner)  Type II diabetes  Patient Reported   Multiple Vitamins-Minerals (MULTIVITAMIN ADULT, MINERALS,) TABS Take 1 tablet by mouth daily  General Health Patient Reported   Needle, Disp, 20G X 1\" MISC every 14 days. To be used to draw up testosterone Hypogonadism Dee Tejada MD   Needle, Disp, 23G X 1\" MISC every 14 days. To be used to inject medication testosterone Hypogonadism Dee Tejada MD   Syringe, Disposable, 1 ML MISC every 14 days. To use with injections Hypogonadism Dee Tejada MD   tadalafil (CIALIS) 20 MG tablet TAKE 1 TABLET BY MOUTH EVERY " DAY AS NEEDED Erectile dysfunction, unspecified erectile dysfunction type Kashmir Jackson MD   testosterone cypionate (DEPOTESTOSTERONE) 200 MG/ML injection Inject 1 mL into the muscle every 14 days.  Hypogonadism Dee Tejada MD         Add new medications, over-the-counter drugs, herbals, vitamins, or  minerals in the blank rows below.    Medication How I take it Why I use it Prescriber                                      Allergies:      No Known Allergies        Side effects I have had:      No Known Side Effects        Other Information:              My notes and questions:

## 2024-10-03 NOTE — Clinical Note
Hi! I met with Sarkis for a ROMEO visit - he has some room for improvement with his diabetes so I am going to continue to follow-up with him.

## 2024-10-03 NOTE — LETTER
October 4, 2024  Steven D Ar  [address] [place] [address]    Dear Mr. Joyner, [facility]     Thank you for talking with me on Oct 3, 2024 about your health and medications. As a follow-up to our conversation, I have included two documents:      Your Recommended To-Do List has steps you should take to get the best results from your medications.  Your Medication List will help you keep track of your medications and how to take them.    If you want to talk about these documents, please call Mami Feliciano RPH at phone: [phone], Monday-Friday 8-4:30pm.    I look forward to working with you and your doctors to make sure your medications work well for you.    Sincerely,  Mami Feliciano RPH  [facility] Pharmacist, [facility]

## 2024-10-03 NOTE — PROGRESS NOTES
Medication Therapy Management (MTM) Encounter    ASSESSMENT:                            Medication Adherence/Access: No issues identified    Diabetes  Patient is not meeting A1c goal of < 8%.  Patient has not been able to monitor his blood glucose with CGM due to [facility] not being able to supply a FreeStyle Coral 3 reader - unable to assess current blood glucose control without readings though patient likely needs additional medications for blood glucose control considering most recent A1c.    Microalbumin is not at goal <30mg/g. Patient on Jardiance and losartan, could consider increasing losartan dose as patient may benefit from additional antihypertensive therapy.  Patient would benefit from starting continuous glucose monitoring, updated eye exam, updated foot exam, and updated vaccines. Will order FreeStyle Coral 3 supplies through [email or web address].       Hypertension   Stable. Patient is meeting blood pressure goal of < 140/90mmHg per last vitals. Unsure what blood pressure looks like at home as he does not measure frequently though some SBP readings may be at goal per patient reported range. In the future, could consider increasing losartan dose as patient may benefit from additional antihypertensive therapy in addition to kidney protection.      Hyperlipidemia   Patient with diabetes recommended to be on at least a moderate intensity statin. Per chart review, does not appear patient has every been on a statin or discussed starting one. Last lipid panel found in the chart is from 11/2022 therefore recommend obtaining an updated lipid panel prior to starting statin therapy. Will discuss further with patient at follow-up visit as he gets labs done prior to chemo therapy, consider adding lipid panel to an upcoming lab draw if patient is able to fast beforehand.      Supplements  Stable.    Pain   Stable. Pain is well controlled and no changes recommended.    ED/Hormone therapy  Stable.    PLAN:            "                 I ordered your FreeStyle Coral 3 supplies through [facility].   You are due for an updated eye exam.   If you are able, monitor your blood pressure at home 2-3 times per week to gauge how well your blood pressure medications are working.      Follow-up: Return in about 4 weeks (around 10/31/2024) for Follow up, with me, using a phone visit.    Issues to be addressed at a future visit:   Discuss plan for following up with audiology for hearing evaluation after removing cerumen compaction (completed 8/15/24).       SUBJECTIVE/OBJECTIVE:                          Sarkis Joyner is a 79 year old male seen for a transitions of care visit. He was discharged from [facility] on 9/25/24 for vertigo and fall with head injury. Patient was accompanied by his wife, Lesley.     Reason for visit: medication review post-hospitalization.    Allergies/ADRs: Reviewed in chart  Past Medical History: Reviewed in chart  Tobacco: He reports that he quit smoking about 21 years ago. His smoking use included cigars. He started smoking about 24 years ago. He has never used smokeless tobacco.  Alcohol: none  Other Substance Use: denies  Falls: denies any since hospitalization     Medication Adherence/Access:   Patient uses pill box(es).  Patient takes medications 2 time(s) per day.   Per patient, misses medication 0 times per week.   Medication barriers: obtaining Coral 3 from the pharmacy.     Diabetes   Metformin XR 1500 mg daily with supper  Jardiance 25mg daily   Glimepiride 4mg twice daily   Not taking aspirin - previously prescribed though discontinued due to age and primary prevention indication   Patient is not experiencing side effects.  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     Patient reports he has lost weight, 220 lbs to 187 lbs, does not feel like eating (started about 2-4 months ago), appetite is bad      Blood sugar monitoring: Continuous Glucose Monitor - patient has the sensors for Free Style Coral 3 system but has not received the reader from [facility] as it is on backorder, wife would like to try ordering through [facility] to see if they are able to supply it   Eye exam in the last 12 months? No - patient and wife aware   Foot exam: due     Hypertension   Amlodipine 5 mg daily  Losartan 50mg daily  Patient reports no current medication side effects  Patient self monitors blood pressure.  Home BP monitoring - infrequent but when he does his -148 mmHg .      BP Readings from Last 3 Encounters:   09/24/24 139/85   08/27/24 (!) 148/78   08/15/24 (!) 144/89        Hyperlipidemia   Gemfibrozil 600mg once daily  Patient reports no significant myalgias or other side effects.  The 10-year ASCVD risk score (Trinidad DK, et al., 2019) is: 61.6%    Values used to calculate the score:      Age: 79 years      Sex: Male      Is Non- : No      Diabetic: Yes      Tobacco smoker: No      Systolic Blood Pressure: 139 mmHg      Is BP treated: Yes      HDL Cholesterol: 30 mg/dL      Total Cholesterol: 170 mg/dL           Supplements   MVI Adult 1 tablet daily   No reported issues at this time.        Pain:    Gabapentin 300mg three times daily as needed - does not use it all that often but when he uses it it is much more effective compared to opioids   Patient reports no medication side effects  Pain type/location: head and neck pain   Patient reports pain is well controlled without medication at this time.     ED/Hormone  therapy:   Tadalafil 20mg daily as needed   Testosterone 200mg/mL injection - 200mg every 14 days - stopped because he reports he is lazy but plans on starting back on it soon  Patient reports no medication side effects    Today's Vitals: There were no vitals taken for this visit.  ----------------  Post Discharge Medication Reconciliation Status: discharge medications reconciled and changed, per note/orders.    I spent 40 minutes with this patient today. All changes were made via collaborative practice agreement with Kashmir Jackson MD, MD. A copy of the visit note was provided to the patient's provider(s).    A summary of these recommendations was sent via [employer].    Rony ChurchD  Medication Therapy Management (MTM) Pharmacist   [facility]    Telemedicine Visit Details  The patient's medications can be safely assessed via a telemedicine encounter.  Type of service:  Telephone visit  Originating Location (pt. Location): Home    Distant Location (provider location):  On-site  Start Time: 2:03 PM  End Time: 2:39 PM     Medication Therapy Recommendations  No medication therapy recommendations to display

## 2024-10-03 NOTE — LETTER
"Recommended To-Do List      Prepared on: Oct 3, 2024       You can get the best results from your medications by completing the items on this \"To-Do List.\"      Bring your To-Do List when you go to your doctor. And, share it with your family or caregivers.    My To-Do List:  What we talked about: What I should do:    Monitoring your blood sugars   I ordered your FreeStyle Coral 3 supplies through [facility].               "

## 2024-10-04 NOTE — PATIENT INSTRUCTIONS
"Recommendations from today's MTM visit:                                                    MTM (medication therapy management) is a service provided by a clinical pharmacist designed to help you get the most of out of your medicines.   Today we reviewed what your medicines are for, how to know if they are working, that your medicines are safe and how to make your medicine regimen as easy as possible.      I ordered your FreeStyle Coral 3 supplies through [facility].   You are due for an updated eye exam.   If you are able, monitor your blood pressure at home 2-3 times per week to gauge how well your blood pressure medications are working.      Follow-up: Return in about 4 weeks (around 10/31/2024) for Follow up, with me, using a phone visit.    It was great speaking with you today.  I value your experience and would be very thankful for your time in providing feedback in our clinic survey. In the next few days, you may receive an email or text message from [employer] with a link to a survey related to your  clinical pharmacist.\"     To schedule another MTM appointment, please call the clinic directly or you may call the MTM scheduling line at [phone].    My Clinical Pharmacist's contact information:                                                      Please feel free to contact me with any questions or concerns you have.      Mami Feliciano PharmD  Medication Therapy Management (MTM) Pharmacist   [facility]     "

## 2024-10-09 ENCOUNTER — LAB (OUTPATIENT)
Dept: INFUSION THERAPY | Facility: HOSPITAL | Age: 79
End: 2024-10-09
Attending: INTERNAL MEDICINE
Payer: COMMERCIAL

## 2024-10-09 DIAGNOSIS — D75.1 ERYTHROCYTOSIS: Primary | ICD-10-CM

## 2024-10-09 DIAGNOSIS — G47.33 OSA (OBSTRUCTIVE SLEEP APNEA): ICD-10-CM

## 2024-10-09 LAB
ALBUMIN SERPL BCG-MCNC: 4.4 G/DL (ref 3.5–5.2)
ALP SERPL-CCNC: 148 U/L (ref 40–150)
ALT SERPL W P-5'-P-CCNC: 28 U/L (ref 0–70)
ANION GAP SERPL CALCULATED.3IONS-SCNC: 12 MMOL/L (ref 7–15)
AST SERPL W P-5'-P-CCNC: 12 U/L (ref 0–45)
BASOPHILS # BLD AUTO: 0.1 10E3/UL (ref 0–0.2)
BASOPHILS NFR BLD AUTO: 2 %
BILIRUB SERPL-MCNC: 0.5 MG/DL
BUN SERPL-MCNC: 20.8 MG/DL (ref 8–23)
CALCIUM SERPL-MCNC: 9.4 MG/DL (ref 8.8–10.4)
CHLORIDE SERPL-SCNC: 104 MMOL/L (ref 98–107)
CREAT SERPL-MCNC: 0.86 MG/DL (ref 0.67–1.17)
EGFRCR SERPLBLD CKD-EPI 2021: 89 ML/MIN/1.73M2
EOSINOPHIL # BLD AUTO: 0.2 10E3/UL (ref 0–0.7)
EOSINOPHIL NFR BLD AUTO: 3 %
ERYTHROCYTE [DISTWIDTH] IN BLOOD BY AUTOMATED COUNT: 13.4 % (ref 10–15)
GLUCOSE SERPL-MCNC: 282 MG/DL (ref 70–99)
HCO3 SERPL-SCNC: 25 MMOL/L (ref 22–29)
HCT VFR BLD AUTO: 47.9 % (ref 40–53)
HGB BLD-MCNC: 16.6 G/DL (ref 13.3–17.7)
IMM GRANULOCYTES # BLD: 0 10E3/UL
IMM GRANULOCYTES NFR BLD: 1 %
LYMPHOCYTES # BLD AUTO: 1.9 10E3/UL (ref 0.8–5.3)
LYMPHOCYTES NFR BLD AUTO: 37 %
MCH RBC QN AUTO: 32.7 PG (ref 26.5–33)
MCHC RBC AUTO-ENTMCNC: 34.7 G/DL (ref 31.5–36.5)
MCV RBC AUTO: 95 FL (ref 78–100)
MONOCYTES # BLD AUTO: 0.6 10E3/UL (ref 0–1.3)
MONOCYTES NFR BLD AUTO: 12 %
NEUTROPHILS # BLD AUTO: 2.3 10E3/UL (ref 1.6–8.3)
NEUTROPHILS NFR BLD AUTO: 46 %
NRBC # BLD AUTO: 0 10E3/UL
NRBC BLD AUTO-RTO: 0 /100
PLATELET # BLD AUTO: 203 10E3/UL (ref 150–450)
POTASSIUM SERPL-SCNC: 4.2 MMOL/L (ref 3.4–5.3)
PROT SERPL-MCNC: 7.3 G/DL (ref 6.4–8.3)
RBC # BLD AUTO: 5.07 10E6/UL (ref 4.4–5.9)
SODIUM SERPL-SCNC: 141 MMOL/L (ref 135–145)
WBC # BLD AUTO: 5 10E3/UL (ref 4–11)

## 2024-10-09 PROCEDURE — 82040 ASSAY OF SERUM ALBUMIN: CPT

## 2024-10-09 PROCEDURE — 85004 AUTOMATED DIFF WBC COUNT: CPT

## 2024-10-09 PROCEDURE — 36415 COLL VENOUS BLD VENIPUNCTURE: CPT

## 2024-10-09 RX ORDER — HEPARIN SODIUM (PORCINE) LOCK FLUSH IV SOLN 100 UNIT/ML 100 UNIT/ML
5 SOLUTION INTRAVENOUS
OUTPATIENT
Start: 2024-10-09

## 2024-10-09 RX ORDER — HEPARIN SODIUM,PORCINE 10 UNIT/ML
5-20 VIAL (ML) INTRAVENOUS DAILY PRN
OUTPATIENT
Start: 2024-10-09

## 2024-10-10 ENCOUNTER — PATIENT OUTREACH (OUTPATIENT)
Dept: ONCOLOGY | Facility: HOSPITAL | Age: 79
End: 2024-10-10
Payer: COMMERCIAL

## 2024-10-31 ENCOUNTER — VIRTUAL VISIT (OUTPATIENT)
Dept: PHARMACY | Facility: CLINIC | Age: 79
End: 2024-10-31
Payer: COMMERCIAL

## 2024-10-31 DIAGNOSIS — E11.42 TYPE 2 DIABETES MELLITUS WITH DIABETIC POLYNEUROPATHY, WITHOUT LONG-TERM CURRENT USE OF INSULIN (H): Primary | ICD-10-CM

## 2024-10-31 DIAGNOSIS — I10 ESSENTIAL HYPERTENSION: ICD-10-CM

## 2024-10-31 DIAGNOSIS — E78.00 HYPERCHOLESTEROLEMIA: ICD-10-CM

## 2024-10-31 PROCEDURE — 99607 MTMS BY PHARM ADDL 15 MIN: CPT | Mod: 93 | Performed by: PHARMACIST

## 2024-10-31 PROCEDURE — 99606 MTMS BY PHARM EST 15 MIN: CPT | Mod: 93 | Performed by: PHARMACIST

## 2024-10-31 RX ORDER — AMLODIPINE BESYLATE 5 MG/1
5 TABLET ORAL DAILY
Qty: 90 TABLET | Refills: 1 | Status: SHIPPED | OUTPATIENT
Start: 2024-10-31

## 2024-10-31 RX ORDER — GEMFIBROZIL 600 MG/1
600 TABLET, FILM COATED ORAL DAILY
Qty: 90 TABLET | Refills: 1 | Status: SHIPPED | OUTPATIENT
Start: 2024-10-31

## 2024-10-31 RX ORDER — SAXAGLIPTIN 5 MG/1
5 TABLET, FILM COATED ORAL DAILY
Qty: 30 TABLET | Refills: 1 | Status: SHIPPED | OUTPATIENT
Start: 2024-10-31

## 2024-10-31 RX ORDER — LOSARTAN POTASSIUM 50 MG/1
50 TABLET ORAL DAILY
Qty: 90 TABLET | Refills: 1 | Status: SHIPPED | OUTPATIENT
Start: 2024-10-31

## 2024-10-31 RX ORDER — METFORMIN HYDROCHLORIDE 500 MG/1
1500 TABLET, EXTENDED RELEASE ORAL
Qty: 270 TABLET | Refills: 1 | Status: SHIPPED | OUTPATIENT
Start: 2024-10-31

## 2024-10-31 NOTE — Clinical Note
Hi! I am following this patient for diabetes but noticed he has his ears cleaned as you recommended and is now in need of eval for hearing aids. Can you have someone from your staff reach out to him to set that up?   Thank you,  Mami Feliciano, PharmD Medication Therapy Management (MTM) Pharmacist  [place] [facility]

## 2024-10-31 NOTE — PROGRESS NOTES
Medication Therapy Management (MTM) Encounter    ASSESSMENT:                            Medication Adherence/Access: No issues identified.    Diabetes  Patient is not meeting A1c goal of < 8%.  Patient is not meeting goal of > 70% time in target with continuous glucose monitoring. Unable to initiate GLP-1 RA due to cost, therefore will start saxagliptin as this appears to be insurance preference for DPP4 inhibitor.  Received flu and COVID vaccines, still due for RSV and shingles -will discuss at follow-up visit.  Microalbumin is not at goal <30mg/g. Patient on Jardiance and losartan, could consider increasing losartan dose as patient may benefit from additional antihypertensive therapy.     Hypertension   Stable. Patient is meeting blood pressure goal of < 140/90mmHg per last vitals. Unsure what blood pressure looks like at home as he does not measure frequently though some SBP readings may be at goal per patient reported range. In the future, could consider increasing losartan dose as patient may benefit from additional antihypertensive therapy in addition to kidney protection.       Hyperlipidemia   Patient with diabetes recommended to be on at least a moderate intensity statin. Per chart review, does not appear patient has every been on a statin or discussed starting one. Last lipid panel found in the chart is from 11/2022, will order today to be drawn with labs prior to chemo at the end of December.    PLAN:                            Start saxagliptin (Onglyza) 5mg daily - continue to monitor blood glucose with CGM.   I refilled metformin, losartan, amlodipine, and gemfibrozil.    I ordered a repeat cholesterol panel which can be drawn with your labs scheduled for 12/31.  Recommend fasting for these labs if able.  Patient due for audiology hearing evaluation - will reach out to provider to have someone reach out to him to schedule     Follow-up: Patient with upcoming appointment with diabetes educator (11/19) -  "will relay documentation from this visit to them and decide appropriate follow-up timeline with MTM pharmacist after this.      SUBJECTIVE/OBJECTIVE:                          Sarkis Joyner is a 79 year old male seen for a follow-up visit. Patient was accompanied by his wife, Lesley.      Reason for visit: diabetes, blood pressure, cholesterol.    Allergies/ADRs: Reviewed in chart  Past Medical History: Reviewed in chart  Tobacco: He reports that he quit smoking about 21 years ago. His smoking use included cigars. He started smoking about 24 years ago. He has never used smokeless tobacco.  Alcohol: none    Medication Adherence/Access: no issues reported.    Diabetes   Metformin XR 1500 mg daily with supper   Jardiance 25mg daily - expensive but able to afford it at this time  Glimepiride 4mg twice daily - breakfast and evening meal  Not taking aspirin - previously prescribed though discontinued due to age and primary prevention indication   Patient is not experiencing side effects.  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     Patient reports he has lost weight, 220 lbs to 187 lbs, does not feel like eating (started about 2-4 months ago), appetite is bad        Blood sugar monitoring: Continuous Glucose Monitor Average glucose Last 14 Days - 195 mg/dl; time in target 45% (wife reports it was originally 19%)   Eye exam in the last 12 months? No  Foot exam: due    Hypertension   Amlodipine 5 mg daily  Losartan 50mg daily  Patient reports no current medication side effects  Patient self monitors " blood pressure.  Home BP monitoring - infrequent but when he does his -148 mmHg .      BP Readings from Last 3 Encounters:   09/24/24 139/85   08/27/24 (!) 148/78   08/15/24 (!) 144/89          Hyperlipidemia   Gemfibrozil 600mg once daily  Patient reports no significant myalgias or other side effects.             Today's Vitals: There were no vitals taken for this visit.  ----------------    I spent 20 minutes with this patient today. All changes were made via collaborative practice agreement with Kashmir Jackson MD, MD. A copy of the visit note was provided to the patient's provider(s).    A summary of these recommendations was sent via [employer].    Rony ChurchD  Medication Therapy Management (MTM) Pharmacist   [facility] [place]    Telemedicine Visit Details  The patient's medications can be safely assessed via a telemedicine encounter.  Type of service:  Telephone visit  Originating Location (pt. Location): Home    Distant Location (provider location):  On-site  Start Time:  1:30 PM  End Time:  1:51 PM     Medication Therapy Recommendations  Diabetes mellitus, type 2 (H)   1 Current Medication: JARDIANCE 25 MG TABS tablet   Current Medication Sig: Take 25 mg by mouth daily   Rationale: Synergistic therapy - Needs additional medication therapy - Indication   Recommendation: Start Medication - Onglyza 5 MG Tabs   Status: Accepted per CPA   Identified Date: 10/31/2024 Completed Date: 10/31/2024

## 2024-10-31 NOTE — Clinical Note
Hello, I followed up with this patient and I see he has an appointment with diabetes education on 11/19. I started saxagliptin today as his blood glucose is still pretty elevated (he has a CGM now which is helpful), unfortunately GLP-1 is not an option due to cost. He likely needs additional therapy but I am trying to max out oral options before we go to insulin. Nallely, I can follow up with him after your visit if you think that would be helpful - can you route me your note after you complete the visit?   Thank you,  Mami Feliciano, PharmD Medication Therapy Management (MTM) Pharmacist  [facility]

## 2024-11-01 NOTE — PROGRESS NOTES
11/1-patient's spouse took number to have him call back to schedule a hearing exam with hearing aid consult/CR

## 2024-11-19 ENCOUNTER — ALLIED HEALTH/NURSE VISIT (OUTPATIENT)
Dept: EDUCATION SERVICES | Facility: CLINIC | Age: 79
End: 2024-11-19
Payer: COMMERCIAL

## 2024-11-19 DIAGNOSIS — E11.65 TYPE 2 DIABETES MELLITUS WITH HYPERGLYCEMIA, WITHOUT LONG-TERM CURRENT USE OF INSULIN (H): Primary | ICD-10-CM

## 2024-11-19 PROCEDURE — G0108 DIAB MANAGE TRN  PER INDIV: HCPCS | Performed by: DIETITIAN, REGISTERED

## 2024-11-19 NOTE — PATIENT INSTRUCTIONS
APPOINTMENT REMINDERS:    1. Eat 3 balanced meals each day - Monitor carbohydrate intake and aim for:  60-75 grams per meal or 4-5 carbohydrate choices ... 1 choice = 15 grams   For snacks, aim for 15-30 grams of carbohydrate or 1-2 choices.      2. Do not wait longer than 4-5 hours to eat something during awake hours. Try to include a protein source with each meal and snack - this has been shown to help with blood sugar elevations.  Simple protein choices: nuts, seeds, peanut butter, cheese stick, sliced cheese, cottage cheese, hard boiled egg, cold cut deli meat, Greek yogurt, egg salad, tuna salad     3. Check your blood sugar 2 times per day (fasting OR 2 hours after eating) using your CGM.  Your blood sugar goals:  mg/dL before eating and  mg/dL 2 hours after eating (or per your doctor).  Always be prepared to treat a low blood sugar (under 70 mg/dL) should it happen. Keep a sugar-containing beverage or food nearby.              Always bring your blood sugar log and meter to your diabetes-related appointments.     4. Incorporate 30 minutes of activity into most days of the week (150 minutes per week is ideal) - it does not need to be all at one time & walking counts!     5. Take diabetes medications as prescribed: continue 1500 mg Metformin XR (3 tablets), 25 mg Jardiance (1 tablet), 4 mg Amaryl (1 tablet) --> take with food, 5 mg Onglyza      I truly appreciate your feedback on our appointment together. You will either receive an email, text message, or mailing survey about today's appointment. Please fill this out at your soonest convenience so I can continue to improve upon my practice. Thank you!      Contact Information:  Nallely Mcmanus, NIGEL, RDN, LD, CDCES  Diabetes     [phone]  Diabetes Education Care Team: [phone]

## 2024-11-19 NOTE — LETTER
11/19/2024         RE: Steven Joyner  [address] [place] [address]        Dear Colleague,    Thank you for referring your patient, Steven Joyner, to the [facility] SPECIALTY [facility]. Please see a copy of my visit note below.    Diabetes Self-Management Education & Support    Presents for: Follow-up    Type of Service: In Person Visit      ASSESSMENT:  Patient presents to clinic today to assess/assist with current diabetes self management skills. Patient arrived today unaccompanied.  Medications were reviewed and patient confirmed they are currently taking 1500 mg Metformin XR, 25 mg Jardiance, 4 mg Amaryl ONCE DAILY with no missed or skipped doses. Patient has NOT started Onglyza. He states he did not get this from the pharmacy with his other prescriptions. Patient has been using the [employer] 3 CGM with reader - did not bring with him today.    Recalls that he was 126 mg/dL this AM, and after a meal can spike up to 260 mg/dL.     Intake was reviewed and patient is currently eating 2-3 meals and 0-1 snacks per day. See diet history below. Areas of opportunity include reducing SSB, reducing milk portions, adjusting CHO portions at meals.   Activity is generally low at this time. Encouraged patient to do what they are able to tolerate while remaining safe.     DIET HISTORY: sleeps late  Breakfast (11AM): tomato soup, crackers (full sleeve), steak, banana  Snack: 4 cups milk  Snack: chicken, fruit (a lot of bananas)  Dinner: diced or mashed potato, steak / chicken  Snack: jello (SF) // small amt ice cream  Beverages: milk, diet coke, juice ==> no water    Patient's most recent   Lab Results   Component Value Date    A1C 10.7 09/23/2024    A1C 7.6 10/18/2022    A1C 7.0 04/16/2021    A1C 6.8 08/06/2020    A1C 6.7 10/23/2019    is not meeting goal of <8.0    Diabetes knowledge and skills assessment:   Patient is knowledgeable in diabetes management concepts related to: Being Active and  Monitoring    Continue education with the following diabetes management concepts: Healthy Eating, Being Active, Monitoring, Taking Medication, Problem Solving, Reducing Risks, and Healthy Coping    Based on learning assessment above, most appropriate setting for further diabetes education would be: Individual setting.      PLAN  1. Eat 3 balanced meals each day - Monitor carbohydrate intake and aim for:  60-75 grams per meal or 4-5 carbohydrate choices ... 1 choice = 15 grams   For snacks, aim for 15-30 grams of carbohydrate or 1-2 choices.      2. Do not wait longer than 4-5 hours to eat something during awake hours. Try to include a protein source with each meal and snack - this has been shown to help with blood sugar elevations.  Simple protein choices: nuts, seeds, peanut butter, cheese stick, sliced cheese, cottage cheese, hard boiled egg, cold cut deli meat, Greek yogurt, egg salad, tuna salad     3. Check your blood sugar 2 times per day (fasting OR 2 hours after eating) using your CGM.  Your blood sugar goals:  mg/dL before eating and  mg/dL 2 hours after eating (or per your doctor).  Always be prepared to treat a low blood sugar (under 70 mg/dL) should it happen. Keep a sugar-containing beverage or food nearby.              Always bring your blood sugar log and meter to your diabetes-related appointments.     4. Incorporate 30 minutes of activity into most days of the week (150 minutes per week is ideal) - it does not need to be all at one time & walking counts!     5. Take diabetes medications as prescribed: continue 1500 mg Metformin XR (3 tablets), 25 mg Jardiance (1 tablet), 4 mg Amaryl (1 tablet) --> take with food, 5 mg Onglyza    Topics to cover at upcoming visits: Healthy Eating, Being Active, Monitoring, Taking Medication, Problem Solving, Reducing Risks, and Healthy Coping    Follow-up: 1/7/25    See Care Plan for co-developed, patient-state behavior change goals.  AVS provided for  "patient today.    Education Materials Provided:  Carbohydrate Counting and My Plate Planner      SUBJECTIVE/OBJECTIVE:  Presents for: Follow-up  Accompanied by: Self  Diabetes education in the past 24mo: Yes  Focus of Visit: Healthy Eating, Problem Solving, Taking Medication, Monitoring  Diabetes type: Type 2  Date of diagnosis: 2018+  How confident are you filling out medical forms by yourself:: Quite a bit  Transportation concerns: No  Difficulty affording diabetes medication?: No  Difficulty affording diabetes testing supplies?: No  Other concerns:: [place] (Hard of hearing), Glasses  Cultural Influences/Ethnic Background:  Not  or     Diabetes Symptoms & Complications:  Diabetes Related Symptoms: Fatigue, Neuropathy, Polyuria (increased urination)  Weight trend: Decreasing  Symptom course: Improving       Patient Problem List and Family Medical History reviewed for relevant medical history, current medical status, and diabetes risk factors.    Vitals:  There were no vitals taken for this visit.  Estimated body mass index is 31.12 kg/m  as calculated from the following:    Height as of 9/23/24: 1.651 m (5' 5\").    Weight as of 9/23/24: 84.8 kg (187 lb).   Last 3 BP:   BP Readings from Last 3 Encounters:   09/24/24 139/85   08/27/24 (!) 148/78   08/15/24 (!) 144/89       History   Smoking Status     Former     Types: Cigars   Smokeless Tobacco     Never       Labs:  Lab Results   Component Value Date    A1C 10.7 09/23/2024     Lab Results   Component Value Date     10/09/2024     09/24/2024     07/08/2021     05/19/2011     Lab Results   Component Value Date    LDL 85 10/23/2019     04/27/2018     Direct Measure HDL   Date Value Ref Range Status   10/23/2019 30 (L) >=40 mg/dL Final     GFR Estimate   Date Value Ref Range Status   10/09/2024 89 >60 mL/min/1.73m2 Final     Comment:     eGFR calculated using 2021 CKD-EPI equation.   07/08/2021 59 (L) >60 mL/min/1.73m2 " "Final   05/19/2011 >60 > - 60      GFR Estimate If Black   Date Value Ref Range Status   07/08/2021 >60 >60 mL/min/1.73m2 Final   05/19/2011 >60 > - 60      Lab Results   Component Value Date    CR 0.86 10/09/2024    CR 1.15 05/19/2011     No results found for: \"MICROALBUMIN\"    Healthy Eating:  Healthy Eating Assessed Today: Yes  Cultural/Buddhism diet restrictions?: No  Do you have any food allergies or intolerances?: No  Meal planning/habits: None  Who cooks/prepares meals for you?: Self, Spouse  Who purchases food in  your home?: Self, Spouse  How many times a week on average do you eat food made away from home (restaurant/take-out)?: 0  Meals include: Breakfast, Lunch, Dinner, Evening Snack  Beverages: Water, Juice, Milk, Diet soda  Has patient met with a dietitian in the past?: Yes    Being Active:  Being Active Assessed Today: Yes  Exercise:: Currently not exercising  Barrier to exercise: None    Monitoring:  Monitoring Assessed Today: Yes  Did patient bring glucose meter to appointment? : No  Blood Glucose Meter: Unknown, CGM  Times checking blood sugar at home (number): 5+  Times checking blood sugar at home (per): Day    Taking Medications:  Diabetes Medication(s)       Biguanides       metFORMIN (GLUCOPHAGE XR) 500 MG 24 hr tablet Take 3 tablets (1,500 mg) by mouth daily (with dinner).       Dipeptidyl Peptidase-4 (DPP-4) Inhibitors       sAXagliptin (ONGLYZA) 5 MG TABS tablet Take 1 tablet (5 mg) by mouth daily.       Sodium-Glucose Co-Transporter 2 (SGLT2) Inhibitors       JARDIANCE 25 MG TABS tablet Take 25 mg by mouth daily       Sulfonylureas       glimepiride (AMARYL) 4 MG tablet Take 4 mg by mouth 2 times daily (before meals).          Taking Medication Assessed Today: Yes  Current Treatments: Oral Medication (taken by mouth)  Problems taking diabetes medications regularly?: No  Diabetes medication side effects?: No    Problem Solving:  Problem Solving Assessed Today: Yes  Is the patient at risk " for hypoglycemia?: Yes  Hypoglycemia Frequency: Never  Does patient have glucagon emergency kit?: Not Needed  Is the patient at risk for DKA?: No    Reducing Risks:  Reducing Risks Assessed Today: No  Has dilated eye exam at least once a year?: No  Sees dentist every 6 months?: No  Feet checked by healthcare provider in the last year?: No    Healthy Coping:  Healthy Coping Assessed Today: Yes  Emotional response to diabetes: Ready to learn  Stage of change: PREPARATION (Decided to change - considering how)    Care Plan and Education Provided:  Healthy Eating: Balanced meals, Carbohydrate Counting, Consistency in amount and timing of carbohydrate intake, and Portion control, Being Active: Finding a physical activity routine that works for you, Monitoring: Individual glucose targets and Log and interpret results, Taking Medication: Action of prescribed medication(s), and Healthy Coping: Benefits of making appropriate lifestyle changes and Utilize support systems    Nallely Mcmanus, NIGEL, RDN, LD, CDCES  Diabetes     [phone]  Diabetes Education Care Team: [phone]    Time Spent: 50 minutes  Encounter Type: Individual    Any diabetes medication dose changes were made via the CDE Protocol per the patient's referring provider. A copy of this encounter was shared with the provider.

## 2024-11-19 NOTE — PROGRESS NOTES
Diabetes Self-Management Education & Support    Presents for: Follow-up    Type of Service: In Person Visit      ASSESSMENT:  Patient presents to clinic today to assess/assist with current diabetes self management skills. Patient arrived today unaccompanied.  Medications were reviewed and patient confirmed they are currently taking 1500 mg Metformin XR, 25 mg Jardiance, 4 mg Amaryl ONCE DAILY with no missed or skipped doses. Patient has NOT started Onglyza. He states he did not get this from the pharmacy with his other prescriptions. Patient has been using the [employer] 3 CGM with reader - did not bring with him today.    Recalls that he was 126 mg/dL this AM, and after a meal can spike up to 260 mg/dL.     Intake was reviewed and patient is currently eating 2-3 meals and 0-1 snacks per day. See diet history below. Areas of opportunity include reducing SSB, reducing milk portions, adjusting CHO portions at meals.   Activity is generally low at this time. Encouraged patient to do what they are able to tolerate while remaining safe.     DIET HISTORY: sleeps late  Breakfast (11AM): tomato soup, crackers (full sleeve), steak, banana  Snack: 4 cups milk  Snack: chicken, fruit (a lot of bananas)  Dinner: diced or mashed potato, steak / chicken  Snack: jello (SF) // small amt ice cream  Beverages: milk, diet coke, juice ==> no water    Patient's most recent   Lab Results   Component Value Date    A1C 10.7 09/23/2024    A1C 7.6 10/18/2022    A1C 7.0 04/16/2021    A1C 6.8 08/06/2020    A1C 6.7 10/23/2019    is not meeting goal of <8.0    Diabetes knowledge and skills assessment:   Patient is knowledgeable in diabetes management concepts related to: Being Active and Monitoring    Continue education with the following diabetes management concepts: Healthy Eating, Being Active, Monitoring, Taking Medication, Problem Solving, Reducing Risks, and Healthy Coping    Based on learning assessment above, most appropriate setting for  further diabetes education would be: Individual setting.      PLAN  1. Eat 3 balanced meals each day - Monitor carbohydrate intake and aim for:  60-75 grams per meal or 4-5 carbohydrate choices ... 1 choice = 15 grams   For snacks, aim for 15-30 grams of carbohydrate or 1-2 choices.      2. Do not wait longer than 4-5 hours to eat something during awake hours. Try to include a protein source with each meal and snack - this has been shown to help with blood sugar elevations.  Simple protein choices: nuts, seeds, peanut butter, cheese stick, sliced cheese, cottage cheese, hard boiled egg, cold cut deli meat, Greek yogurt, egg salad, tuna salad     3. Check your blood sugar 2 times per day (fasting OR 2 hours after eating) using your CGM.  Your blood sugar goals:  mg/dL before eating and  mg/dL 2 hours after eating (or per your doctor).  Always be prepared to treat a low blood sugar (under 70 mg/dL) should it happen. Keep a sugar-containing beverage or food nearby.              Always bring your blood sugar log and meter to your diabetes-related appointments.     4. Incorporate 30 minutes of activity into most days of the week (150 minutes per week is ideal) - it does not need to be all at one time & walking counts!     5. Take diabetes medications as prescribed: continue 1500 mg Metformin XR (3 tablets), 25 mg Jardiance (1 tablet), 4 mg Amaryl (1 tablet) --> take with food, 5 mg Onglyza    Topics to cover at upcoming visits: Healthy Eating, Being Active, Monitoring, Taking Medication, Problem Solving, Reducing Risks, and Healthy Coping    Follow-up: 1/7/25    See Care Plan for co-developed, patient-state behavior change goals.  AVS provided for patient today.    Education Materials Provided:  Carbohydrate Counting and My Plate Planner      SUBJECTIVE/OBJECTIVE:  Presents for: Follow-up  Accompanied by: Self  Diabetes education in the past 24mo: Yes  Focus of Visit: Healthy Eating, Problem Solving, Taking  "Medication, Monitoring  Diabetes type: Type 2  Date of diagnosis: 2018+  How confident are you filling out medical forms by yourself:: Quite a bit  Transportation concerns: No  Difficulty affording diabetes medication?: No  Difficulty affording diabetes testing supplies?: No  Other concerns:: [place] (Hard of hearing), Glasses  Cultural Influences/Ethnic Background:  Not  or     Diabetes Symptoms & Complications:  Diabetes Related Symptoms: Fatigue, Neuropathy, Polyuria (increased urination)  Weight trend: Decreasing  Symptom course: Improving       Patient Problem List and Family Medical History reviewed for relevant medical history, current medical status, and diabetes risk factors.    Vitals:  There were no vitals taken for this visit.  Estimated body mass index is 31.12 kg/m  as calculated from the following:    Height as of 9/23/24: 1.651 m (5' 5\").    Weight as of 9/23/24: 84.8 kg (187 lb).   Last 3 BP:   BP Readings from Last 3 Encounters:   09/24/24 139/85   08/27/24 (!) 148/78   08/15/24 (!) 144/89       History   Smoking Status    Former    Types: Cigars   Smokeless Tobacco    Never       Labs:  Lab Results   Component Value Date    A1C 10.7 09/23/2024     Lab Results   Component Value Date     10/09/2024     09/24/2024     07/08/2021     05/19/2011     Lab Results   Component Value Date    LDL 85 10/23/2019     04/27/2018     Direct Measure HDL   Date Value Ref Range Status   10/23/2019 30 (L) >=40 mg/dL Final     GFR Estimate   Date Value Ref Range Status   10/09/2024 89 >60 mL/min/1.73m2 Final     Comment:     eGFR calculated using 2021 CKD-EPI equation.   07/08/2021 59 (L) >60 mL/min/1.73m2 Final   05/19/2011 >60 > - 60      GFR Estimate If Black   Date Value Ref Range Status   07/08/2021 >60 >60 mL/min/1.73m2 Final   05/19/2011 >60 > - 60      Lab Results   Component Value Date    CR 0.86 10/09/2024    CR 1.15 05/19/2011     No results found for: " "\"MICROALBUMIN\"    Healthy Eating:  Healthy Eating Assessed Today: Yes  Cultural/Denominational diet restrictions?: No  Do you have any food allergies or intolerances?: No  Meal planning/habits: None  Who cooks/prepares meals for you?: Self, Spouse  Who purchases food in  your home?: Self, Spouse  How many times a week on average do you eat food made away from home (restaurant/take-out)?: 0  Meals include: Breakfast, Lunch, Dinner, Evening Snack  Beverages: Water, Juice, Milk, Diet soda  Has patient met with a dietitian in the past?: Yes    Being Active:  Being Active Assessed Today: Yes  Exercise:: Currently not exercising  Barrier to exercise: None    Monitoring:  Monitoring Assessed Today: Yes  Did patient bring glucose meter to appointment? : No  Blood Glucose Meter: Unknown, CGM  Times checking blood sugar at home (number): 5+  Times checking blood sugar at home (per): Day    Taking Medications:  Diabetes Medication(s)       Biguanides       metFORMIN (GLUCOPHAGE XR) 500 MG 24 hr tablet Take 3 tablets (1,500 mg) by mouth daily (with dinner).       Dipeptidyl Peptidase-4 (DPP-4) Inhibitors       sAXagliptin (ONGLYZA) 5 MG TABS tablet Take 1 tablet (5 mg) by mouth daily.       Sodium-Glucose Co-Transporter 2 (SGLT2) Inhibitors       JARDIANCE 25 MG TABS tablet Take 25 mg by mouth daily       Sulfonylureas       glimepiride (AMARYL) 4 MG tablet Take 4 mg by mouth 2 times daily (before meals).          Taking Medication Assessed Today: Yes  Current Treatments: Oral Medication (taken by mouth)  Problems taking diabetes medications regularly?: No  Diabetes medication side effects?: No    Problem Solving:  Problem Solving Assessed Today: Yes  Is the patient at risk for hypoglycemia?: Yes  Hypoglycemia Frequency: Never  Does patient have glucagon emergency kit?: Not Needed  Is the patient at risk for DKA?: No    Reducing Risks:  Reducing Risks Assessed Today: No  Has dilated eye exam at least once a year?: No  Sees dentist " every 6 months?: No  Feet checked by healthcare provider in the last year?: No    Healthy Coping:  Healthy Coping Assessed Today: Yes  Emotional response to diabetes: Ready to learn  Stage of change: PREPARATION (Decided to change - considering how)    Care Plan and Education Provided:  Healthy Eating: Balanced meals, Carbohydrate Counting, Consistency in amount and timing of carbohydrate intake, and Portion control, Being Active: Finding a physical activity routine that works for you, Monitoring: Individual glucose targets and Log and interpret results, Taking Medication: Action of prescribed medication(s), and Healthy Coping: Benefits of making appropriate lifestyle changes and Utilize support systems    Nallely Mcmanus, NIGEL, RDN, LD, CDCES  Diabetes     [phone]  Diabetes Education Care Team: [phone]    Time Spent: 50 minutes  Encounter Type: Individual    Any diabetes medication dose changes were made via the CDE Protocol per the patient's referring provider. A copy of this encounter was shared with the provider.

## 2024-11-21 ASSESSMENT — ASTHMA QUESTIONNAIRES
ACT_TOTALSCORE: 25
QUESTION_1 LAST FOUR WEEKS HOW MUCH OF THE TIME DID YOUR ASTHMA KEEP YOU FROM GETTING AS MUCH DONE AT WORK, SCHOOL OR AT HOME: NONE OF THE TIME
ACT_TOTALSCORE: 25
QUESTION_4 LAST FOUR WEEKS HOW OFTEN HAVE YOU USED YOUR RESCUE INHALER OR NEBULIZER MEDICATION (SUCH AS ALBUTEROL): NOT AT ALL
QUESTION_3 LAST FOUR WEEKS HOW OFTEN DID YOUR ASTHMA SYMPTOMS (WHEEZING, COUGHING, SHORTNESS OF BREATH, CHEST TIGHTNESS OR PAIN) WAKE YOU UP AT NIGHT OR EARLIER THAN USUAL IN THE MORNING: NOT AT ALL
QUESTION_5 LAST FOUR WEEKS HOW WOULD YOU RATE YOUR ASTHMA CONTROL: COMPLETELY CONTROLLED
QUESTION_2 LAST FOUR WEEKS HOW OFTEN HAVE YOU HAD SHORTNESS OF BREATH: NOT AT ALL

## 2024-11-25 ENCOUNTER — OFFICE VISIT (OUTPATIENT)
Dept: FAMILY MEDICINE | Facility: CLINIC | Age: 79
End: 2024-11-25
Payer: COMMERCIAL

## 2024-11-25 VITALS
RESPIRATION RATE: 24 BRPM | SYSTOLIC BLOOD PRESSURE: 136 MMHG | HEIGHT: 65 IN | DIASTOLIC BLOOD PRESSURE: 76 MMHG | TEMPERATURE: 98.6 F | BODY MASS INDEX: 32.17 KG/M2 | WEIGHT: 193.1 LBS | OXYGEN SATURATION: 95 % | HEART RATE: 87 BPM

## 2024-11-25 DIAGNOSIS — N52.9 ERECTILE DYSFUNCTION, UNSPECIFIED ERECTILE DYSFUNCTION TYPE: ICD-10-CM

## 2024-11-25 DIAGNOSIS — I67.9 CEREBROVASCULAR DISEASE: ICD-10-CM

## 2024-11-25 DIAGNOSIS — E11.42 DIABETIC POLYNEUROPATHY ASSOCIATED WITH TYPE 2 DIABETES MELLITUS (H): ICD-10-CM

## 2024-11-25 DIAGNOSIS — E11.69 TYPE 2 DIABETES MELLITUS WITH OTHER SPECIFIED COMPLICATION, WITHOUT LONG-TERM CURRENT USE OF INSULIN (H): Primary | ICD-10-CM

## 2024-11-25 PROCEDURE — 99214 OFFICE O/P EST MOD 30 MIN: CPT | Performed by: FAMILY MEDICINE

## 2024-11-25 RX ORDER — ASPIRIN 81 MG/1
81 TABLET ORAL DAILY
Qty: 90 TABLET | Refills: 3 | Status: SHIPPED | OUTPATIENT
Start: 2024-11-25

## 2024-11-25 RX ORDER — GABAPENTIN 300 MG/1
300 CAPSULE ORAL AT BEDTIME
Qty: 90 CAPSULE | Refills: 0 | Status: SHIPPED | OUTPATIENT
Start: 2024-11-25

## 2024-11-25 RX ORDER — ROSUVASTATIN CALCIUM 10 MG/1
10 TABLET, COATED ORAL DAILY
Qty: 90 TABLET | Refills: 3 | Status: SHIPPED | OUTPATIENT
Start: 2024-11-25

## 2024-11-25 RX ORDER — TADALAFIL 20 MG/1
TABLET ORAL
Qty: 30 TABLET | Refills: 5 | Status: SHIPPED | OUTPATIENT
Start: 2024-11-25

## 2024-11-25 ASSESSMENT — PAIN SCALES - GENERAL: PAINLEVEL_OUTOF10: NO PAIN (0)

## 2024-11-25 NOTE — PATIENT INSTRUCTIONS
The MRI scan done in the emergency room when you have the head injury showed you had a stroke sometime in the past.    I want you to take the following medications to prevent you from having another stroke.    Rosuvastatin (Crestor) 10 mg once daily, prescription sent to your pharmacy.  Aspirin 81 mg once daily, prescription sent to your pharmacy        You have neuropathy caused by diabetes.  This causes the symptoms in your feet at bedtime.  Take gabapentin 300 mg capsule 1 capsule at bedtime.  This will help relieve the discomfort of the neuropathy.  A prescription for gabapentin 300 mg capsules has been sent to your pharmacy.        For erectile dysfunction the best medication available I think is tadalafil (Cialis), the highest dose is 20 mg.  I have sent this prescription to your pharmacy.  You can take 1 tablet daily as needed.      You seem to be doing much better at controlling your blood sugar, keep up the good work.  Keep checking your blood sugar as you have been doing.  Keep taking the medications that have been recommended.  Please schedule a follow-up visit in 1 to 2 months to recheck the hemoglobin A1c to measure your progress more specifically for diabetes.

## 2024-11-25 NOTE — PROGRESS NOTES
"Steven Joyner  /78   Pulse 87   Temp 98.6  F (37  C) (Oral)   Resp 24   Ht 1.651 m (5' 5\")   Wt 87.6 kg (193 lb 1.6 oz)   SpO2 95%   BMI 32.13 kg/m       Assessment/Plan:                Sarkis was seen today for recheck, diabetes and history of present illness.    Diagnoses and all orders for this visit:    Type 2 diabetes mellitus with other specified complication, without long-term current use of insulin (H)    Erectile dysfunction, unspecified erectile dysfunction type  -     tadalafil (CIALIS) 20 MG tablet; TAKE 1 TABLET BY MOUTH EVERY DAY AS NEEDED    Cerebrovascular disease  -     aspirin 81 MG EC tablet; Take 1 tablet (81 mg) by mouth daily.  -     rosuvastatin (CRESTOR) 10 MG tablet; Take 1 tablet (10 mg) by mouth daily.    Diabetic polyneuropathy associated with type 2 diabetes mellitus (H)  -     gabapentin (NEURONTIN) 300 MG capsule; Take 1 capsule (300 mg) by mouth at bedtime.         DISCUSSION  Continue to work with diabetes nurse educators [facility] pharmacist and myself to help manage diabetes.  Recommend follow-up in about 1 to 2 months to reassess A1c and continue discussion.    Add aspirin and rosuvastatin given history of cerebrovascular disease incidentally noted on MRI scan.    Monitor for any additional sequela of his head injury.  At this time there does not seem to be any significant concern requiring additional action.    Prescription for gabapentin 300 mg capsule at bedtime for helping manage neuropathy which is most bothersome at night.  Subjective:     HPI:    Steven Joyner is a 79 year old male has type 2 diabetes.  Based on most recent assessment with A1c in September he is not well-controlled although he has been started on new medications.  He is seen diabetic nurse educator and met with [facility] pharmacist.  He is now on Jardiance, metformin and glimepiride.  Patient is reporting more favorable blood sugars.  He has made significant changes to his diet to eliminate sugar.  He seems " to overall be doing much better with diabetes management.    He has significant neuropathy pain.  This bothers him primarily at night.  We discussed use of gabapentin.  Foot exam is performed, see below.    He had a fall at home in late September.  He was assessed with an MRI scan which showed an old cerebellar infarct.  Patient does not have any history of a known stroke.  Today we discussed starting him on aspirin and statin in addition to his other ongoing medications in an effort to help reduce stroke risk.    Review of systems patient denying any ongoing concerns related to his head injury.  Specifically no ongoing headache concerns, no memory or cognition difficulties are described.    ROS:  Complete review of systems is obtained.  Other than the specific considerations noted above complete review of systems is negative.          Objective:   Medications:  Current Outpatient Medications   Medication Sig Dispense Refill    amLODIPine (NORVASC) 5 MG tablet Take 1 tablet (5 mg) by mouth daily. 90 tablet 1    aspirin 81 MG EC tablet Take 1 tablet (81 mg) by mouth daily. 90 tablet 3    Continuous Glucose Sensor (FREESTYLE GAGE 3 PLUS SENSOR) MISC Use 1 sensor every 15 days. Use to read blood sugars per 's instructions. 6 each 3    gabapentin (NEURONTIN) 300 MG capsule Take 1 capsule (300 mg) by mouth at bedtime. 90 capsule 0    gabapentin (NEURONTIN) 300 MG capsule Take 300 mg by mouth 3 times daily as needed for neuropathic pain.      gemfibrozil (LOPID) 600 MG tablet Take 1 tablet (600 mg) by mouth daily. 90 tablet 1    glimepiride (AMARYL) 4 MG tablet Take 4 mg by mouth 2 times daily (before meals).      JARDIANCE 25 MG TABS tablet Take 25 mg by mouth daily      losartan (COZAAR) 50 MG tablet Take 1 tablet (50 mg) by mouth daily. 90 tablet 1    metFORMIN (GLUCOPHAGE XR) 500 MG 24 hr tablet Take 3 tablets (1,500 mg) by mouth daily (with dinner). 270 tablet 1    Multiple Vitamins-Minerals  "(MULTIVITAMIN ADULT, MINERALS,) TABS Take 1 tablet by mouth daily      Needle, Disp, 20G X 1\" MISC every 14 days. To be used to draw up testosterone 30 each 1    Needle, Disp, 23G X 1\" MISC every 14 days. To be used to inject medication testosterone 30 each 1    rosuvastatin (CRESTOR) 10 MG tablet Take 1 tablet (10 mg) by mouth daily. 90 tablet 3    Syringe, Disposable, 1 ML MISC every 14 days. To use with injections 30 each 1    tadalafil (CIALIS) 20 MG tablet TAKE 1 TABLET BY MOUTH EVERY DAY AS NEEDED 30 tablet 5     No current facility-administered medications for this visit.        Allergies:   No Known Allergies     Social History     Socioeconomic History    Marital status:      Spouse name: Not on file    Number of children: Not on file    Years of education: Not on file    Highest education level: Not on file   Occupational History    Not on file   Tobacco Use    Smoking status: Former     Types: Cigars     Start date: 2000     Quit date: 2003     Years since quittin.2    Smokeless tobacco: Never   Vaping Use    Vaping status: Never Used   Substance and Sexual Activity    Alcohol use: Not Currently    Drug use: Never    Sexual activity: Not on file   Other Topics Concern    Parent/sibling w/ CABG, MI or angioplasty before 65F 55M? Not Asked   Social History Narrative    Not on file     Social Drivers of Health     Financial Resource Strain: Low Risk  (2024)    Financial Resource Strain     Within the past 12 months, have you or your family members you live with been unable to get utilities (heat, electricity) when it was really needed?: No   Food Insecurity: Low Risk  (2024)    Food Insecurity     Within the past 12 months, did you worry that your food would run out before you got money to buy more?: No     Within the past 12 months, did the food you bought just not last and you didn t have money to get more?: No   Transportation Needs: Low Risk  (2024)    Transportation " Needs     Within the past 12 months, has lack of transportation kept you from medical appointments, getting your medicines, non-medical meetings or appointments, work, or from getting things that you need?: No   Physical Activity: Not on file   Stress: Not on file   Social Connections: Unknown (12/30/2021)    [facility]    Social Connections     Frequency of Communication with Friends and Family: Not on file   Interpersonal Safety: Low Risk  (3/11/2024)    Interpersonal Safety     Do you feel physically and emotionally safe where you currently live?: Yes     Within the past 12 months, have you been hit, slapped, kicked or otherwise physically hurt by someone?: No     Within the past 12 months, have you been humiliated or emotionally abused in other ways by your partner or ex-partner?: No   Housing Stability: Low Risk  (9/23/2024)    Housing Stability     Do you have housing? : Yes     Are you worried about losing your housing?: No       Family History   Problem Relation Age of Onset    Psychotic Disorder Sister         vulnerable adult    Cerebrovascular Disease Mother         Most Recent Immunizations   Administered Date(s) Administered    COVID-19 12+ (Pfizer) 10/14/2024    COVID-19 MONOVALENT 12+ (Pfizer) 11/16/2021    COVID-19 Monovalent 12+ (Pfizer 2022) 04/15/2022    Flu, Unspecified 10/22/2014    Influenza (High Dose) Trivalent,PF (Fluzone) 10/14/2024    Influenza (IIV3) PF 10/22/2014    Influenza Vaccine 65+ (FLUAD) 11/22/2021    Influenza Vaccine 65+ (Fluzone HD) 12/20/2023    Pneumo Conj 13-V (2010&after) 10/22/2014    Pneumococcal 23 valent 07/23/2012    RSV Vaccine (Abrysvo) 10/14/2024    TD,PF 7+ (Tenivac) 05/15/2002    TDAP (Adacel,Boostrix) 08/03/2018    Td (Adult), Adsorbed 07/26/2005    Zoster vaccine, live 07/03/2012        Wt Readings from Last 3 Encounters:   11/25/24 87.6 kg (193 lb 1.6 oz)   09/23/24  "84.8 kg (187 lb)   08/27/24 84.8 kg (187 lb)        BP Readings from Last 6 Encounters:   11/25/24 136/76   09/24/24 139/85   08/27/24 (!) 148/78   08/15/24 (!) 144/89   07/02/24 137/82   07/03/24 129/79        Hemoglobin A1C   Date Value Ref Range Status   09/23/2024 10.7 (H) <5.7 % Final     Comment:     Normal <5.7%   Prediabetes 5.7-6.4%    Diabetes 6.5% or higher     Note: Adopted from ADA consensus guidelines.   10/18/2022 7.6 (H) 0.0 - 5.6 % Final     Comment:     Normal <5.7%   Prediabetes 5.7-6.4%    Diabetes 6.5% or higher     Note: Adopted from ADA consensus guidelines.   04/16/2021 7.0 (H) <=5.6 % Final              PHYSICAL EXAM:    /76   Pulse 87   Temp 98.6  F (37  C) (Oral)   Resp 24   Ht 1.651 m (5' 5\")   Wt 87.6 kg (193 lb 1.6 oz)   SpO2 95%   BMI 32.13 kg/m           General Appearance:    Alert, cooperative, no distress   Eyes:   No scleral icterus or conjunctival irritation       Ears:    Normal TM's and external ear canals, both ears   Throat:   Lips, mucosa, and tongue normal; teeth and gums normal   Neck:   Supple, symmetrical, trachea midline, no adenopathy;        thyroid:  No enlargement/tenderness/nodules   Lungs:     Clear to auscultation bilaterally, respirations unlabored, no wheezesor crackles   Heart:    Regular rate and rhythm,  No murmur   Abdomen:    Soft, no distention, no tenderness on palpation, no masses, no organomegaly     Extremities:  No edema, no jointswelling or redness, no evidence of any injuries, palpable dorsalis pedis pulses.  No ulcerations.  No significant callus formation.  No other foot deformities.   Skin:  Noconcerning skin findings, no suspicious moles, no rashes   Neurologic:  On gross examination there is no motor or sensory deficit.  Specific examination of the feet using the monofilament line reveals that there is no absence of sensation.  Patient walks with a normal gait                                     "

## 2024-12-22 ENCOUNTER — HEALTH MAINTENANCE LETTER (OUTPATIENT)
Age: 79
End: 2024-12-22

## 2024-12-31 ENCOUNTER — TELEPHONE (OUTPATIENT)
Dept: ONCOLOGY | Facility: HOSPITAL | Age: 79
End: 2024-12-31
Payer: COMMERCIAL

## 2025-01-21 ENCOUNTER — PATIENT OUTREACH (OUTPATIENT)
Dept: ONCOLOGY | Facility: HOSPITAL | Age: 80
End: 2025-01-21

## 2025-01-21 ENCOUNTER — LAB (OUTPATIENT)
Dept: INFUSION THERAPY | Facility: HOSPITAL | Age: 80
End: 2025-01-21
Attending: INTERNAL MEDICINE
Payer: COMMERCIAL

## 2025-01-21 ENCOUNTER — ONCOLOGY VISIT (OUTPATIENT)
Dept: ONCOLOGY | Facility: HOSPITAL | Age: 80
End: 2025-01-21
Attending: INTERNAL MEDICINE
Payer: COMMERCIAL

## 2025-01-21 VITALS
HEART RATE: 92 BPM | WEIGHT: 201 LBS | OXYGEN SATURATION: 95 % | BODY MASS INDEX: 34.31 KG/M2 | DIASTOLIC BLOOD PRESSURE: 85 MMHG | SYSTOLIC BLOOD PRESSURE: 139 MMHG | RESPIRATION RATE: 22 BRPM | HEIGHT: 64 IN | TEMPERATURE: 98.2 F

## 2025-01-21 DIAGNOSIS — D75.1 ERYTHROCYTOSIS: Primary | ICD-10-CM

## 2025-01-21 DIAGNOSIS — D75.1 ERYTHROCYTOSIS: ICD-10-CM

## 2025-01-21 DIAGNOSIS — G47.33 OSA (OBSTRUCTIVE SLEEP APNEA): ICD-10-CM

## 2025-01-21 LAB
ERYTHROCYTE [DISTWIDTH] IN BLOOD BY AUTOMATED COUNT: 12.8 % (ref 10–15)
HCT VFR BLD AUTO: 49 % (ref 40–53)
HGB BLD-MCNC: 16.6 G/DL (ref 13.3–17.7)
MCH RBC QN AUTO: 32.1 PG (ref 26.5–33)
MCHC RBC AUTO-ENTMCNC: 33.9 G/DL (ref 31.5–36.5)
MCV RBC AUTO: 95 FL (ref 78–100)
PLATELET # BLD AUTO: 185 10E3/UL (ref 150–450)
RBC # BLD AUTO: 5.17 10E6/UL (ref 4.4–5.9)
WBC # BLD AUTO: 6.6 10E3/UL (ref 4–11)

## 2025-01-21 PROCEDURE — 99203 OFFICE O/P NEW LOW 30 MIN: CPT | Performed by: NURSE PRACTITIONER

## 2025-01-21 PROCEDURE — 36415 COLL VENOUS BLD VENIPUNCTURE: CPT

## 2025-01-21 PROCEDURE — G0463 HOSPITAL OUTPT CLINIC VISIT: HCPCS | Performed by: NURSE PRACTITIONER

## 2025-01-21 PROCEDURE — 85027 COMPLETE CBC AUTOMATED: CPT

## 2025-01-21 RX ORDER — KETOROLAC TROMETHAMINE 30 MG/ML
INJECTION, SOLUTION INTRAMUSCULAR; INTRAVENOUS
COMMUNITY
Start: 2024-10-04

## 2025-01-21 RX ORDER — FLUOXETINE 10 MG/1
CAPSULE ORAL
COMMUNITY
Start: 2025-01-13

## 2025-01-21 ASSESSMENT — PAIN SCALES - GENERAL: PAINLEVEL_OUTOF10: NO PAIN (0)

## 2025-01-21 NOTE — LETTER
"1/21/2025      Steven Joyner  [address] [place] [address]      Dear Colleague,    Thank you for referring your patient, Steven Joyner, to the [facility]. Please see a copy of my visit note below.    Oncology Rooming Note    January 21, 2025 1:01 PM   Steven Joyner is a 79 year old male who presents for:    Chief Complaint   Patient presents with     Oncology Clinic Visit     6 month follow up with labs     Initial Vitals: /85 (BP Location: Right arm, Patient Position: Sitting, Cuff Size: Adult Large)   Pulse 92   Temp 98.2  F (36.8  C) (Tympanic)   Resp 22   Ht 1.632 m (5' 4.25\")   Wt 91.2 kg (201 lb)   SpO2 95%   BMI 34.23 kg/m   Estimated body mass index is 34.23 kg/m  as calculated from the following:    Height as of this encounter: 1.632 m (5' 4.25\").    Weight as of this encounter: 91.2 kg (201 lb). Body surface area is 2.03 meters squared.  No Pain (0) Comment: Data Unavailable   No LMP for male patient.  Allergies reviewed: Yes  Medications reviewed: Yes    Medications: Medication refills not needed today.  Pharmacy name entered into [employer]:    [id number] - [place], [place] - [address] [facility] - [place], [place] - [address]    Frailty Screening:   Is the patient here for a new oncology consult visit in cancer care? 2. No      Clinical concerns:      JEANINE ESCALONA CMA              [facility] Hematology and Oncology Progress Note    Patient: Steven Joyner  MRN: [id number]  Date of Service: 01/21/2025        Reason for Visit    Chief Complaint   Patient presents with     Oncology Clinic Visit     6 month follow up with labs       Assessment and Plan     Cancer Staging   No matching staging information was found for the patient.    Erythrocytosis, secondary likely from obstructive sleep apnea: Has lost a lot of weight which has likely helped.  He currently is not using a CPAP machine because he is " try different types and they are not comfortable so is not really willing to do that anymore.  He feels like he is sleeping pretty well.  Hemoglobin and hematocrit levels have been running fairly normal over the last 6 months.  The last time it was elevated it was October 2022.  At this time patient does not need to continue to follow with us.  I did tell him he should probably see his primary care doctor once or twice a year and get a CBC just to monitor.  If he does continue to have signs of sleep apnea I would recommend getting a CPAP machine.  Patient certainly would be willing to do that.      Recent concussion: Patient fell and hit his head on his car recently.  He says he still noticing symptoms of a concussion.  He has some questionable how long that can happen.  I did tell him that usually it can take quite a while and to monitor and if they do not get better over the next couple weeks to follow-up with primary care doctor.    ECOG Performance    0 - Independent    Distress Screening (within last 30 days)    No data recorded     Pain  Pain Score: No Pain (0)  Pain Loc: Other - see comment (left shoulder)    Problem List    Patient Active Problem List   Diagnosis     Hypogonadism, male     MAUDE (obstructive sleep apnea)     Essential hypertension     Abnormal weight gain     Allergic rhinitis     Bloating     BPH (benign prostatic hyperplasia)     Cervicalgia     Contact dermatitis and other eczema, due to unspecified cause     Diabetes mellitus, type 2 (H)     Dietary counseling and surveillance     Disorder of bursae and tendons in shoulder region     Erythrocytosis     Acquired polycythemia     Hallux limitus of left foot     Hallux valgus     Hypercholesterolemia     Mild intermittent asthma     Morbid obesity (H)     Post-traumatic stress disorder     Psychosexual dysfunction with inhibited sexual excitement     Vertigo     Memory loss, short term     Injury of head, initial encounter     Fall at home,  initial encounter        ______________________________________________________________________________    History of Present Illness    Hematologist: Dr. Lagunas    Diagnosis: Erythrocytosis.  Likely from MAUDE.  Also could have been from medications he was taking related to competitive weightlifting    Treatment:  Observation.    Interim history:  Patient is here today for follow-up visit.  He was last seen about 6 months ago.  Overall he states that he has not noticed any changes.  The only thing he is complaining about today is a couple weeks ago he fell in his garage hit his head on his car and came to the ER because he did knocked unconscious.  He states he does not know how long he was out but he was cold when he came to.  He was seen in the ER and they did not really do anything and said it was likely a bad concussion.  Patient continues to have some headaches.  Does not always sleep well.  He has a little bit of personality change like getting a little bit angry easier.  Other than that denies any vision issues.  Denies any neurological changes.  Denies any bleeding bruising.  Denies any bone or back pain.  States that he did lose a lot of weight but a lot of it was muscle because he has not really been able to do much of his weight lifting that he used to do but he is trying to slowly get back some muscle.    Review of Systems    Pertinent items are noted in HPI.    Past History    Past Medical History:   Diagnosis Date     Benign positional vertigo      Cellulitis and abscess of leg Feb. 2012    hospitalized at [facility] for treatment     Conductive hearing loss      Hearing problem      Hypertension      Hypogonadism, male      Obstructive sleep apnea      MAUDE (obstructive sleep apnea)      Problem, psychiatric      Recurrent otitis media      Tinnitus      Uncomplicated asthma        PHYSICAL EXAM  /85 (BP Location: Right arm, Patient Position: Sitting, Cuff Size: Adult Large)   Pulse 92   " Temp 98.2  F (36.8  C) (Tympanic)   Resp 22   Ht 1.632 m (5' 4.25\")   Wt 91.2 kg (201 lb)   SpO2 95%   BMI 34.23 kg/m      GENERAL: no acute distress. Cooperative in conversation. Alone in clinic  RESP: Regular respiratory rate. No expiratory wheezes   NEURO: non focal. Alert and oriented x3.   PSYCH: within normal limits. No depression or anxiety.  SKIN: exposed skin is dry intact.     Lab Results    Recent Results (from the past week)   CBC with platelets   Result Value Ref Range    WBC Count 6.6 4.0 - 11.0 10e3/uL    RBC Count 5.17 4.40 - 5.90 10e6/uL    Hemoglobin 16.6 13.3 - 17.7 g/dL    Hematocrit 49.0 40.0 - 53.0 %    MCV 95 78 - 100 fL    MCH 32.1 26.5 - 33.0 pg    MCHC 33.9 31.5 - 36.5 g/dL    RDW 12.8 10.0 - 15.0 %    Platelet Count 185 150 - 450 10e3/uL       Imaging    No results found.      Signed by: EDSON Stantno CNP      Again, thank you for allowing me to participate in the care of your patient.        Sincerely,        EDSON Stanton CNP    Electronically signed"

## 2025-01-21 NOTE — PROGRESS NOTES
[facility] Hematology and Oncology Progress Note    Patient: Steven Joyner  MRN: [id number]  Date of Service: 01/21/2025        Reason for Visit    Chief Complaint   Patient presents with    Oncology Clinic Visit     6 month follow up with labs       Assessment and Plan     Cancer Staging   No matching staging information was found for the patient.    Erythrocytosis, secondary likely from obstructive sleep apnea: Has lost a lot of weight which has likely helped.  He currently is not using a CPAP machine because he is try different types and they are not comfortable so is not really willing to do that anymore.  He feels like he is sleeping pretty well.  Hemoglobin and hematocrit levels have been running fairly normal over the last 6 months.  The last time it was elevated it was October 2022.  At this time patient does not need to continue to follow with us.  I did tell him he should probably see his primary care doctor once or twice a year and get a CBC just to monitor.  If he does continue to have signs of sleep apnea I would recommend getting a CPAP machine.  Patient certainly would be willing to do that.      Recent concussion: Patient fell and hit his head on his car recently.  He says he still noticing symptoms of a concussion.  He has some questionable how long that can happen.  I did tell him that usually it can take quite a while and to monitor and if they do not get better over the next couple weeks to follow-up with primary care doctor.    ECOG Performance    0 - Independent    Distress Screening (within last 30 days)    No data recorded     Pain  Pain Score: No Pain (0)  Pain Loc: Other - see comment (left shoulder)    Problem List    Patient Active Problem List   Diagnosis    Hypogonadism, male    MAUDE (obstructive sleep apnea)    Essential hypertension    Abnormal weight gain    Allergic rhinitis    Bloating    BPH (benign prostatic hyperplasia)    Cervicalgia    Contact dermatitis and other  eczema, due to unspecified cause    Diabetes mellitus, type 2 (H)    Dietary counseling and surveillance    Disorder of bursae and tendons in shoulder region    Erythrocytosis    Acquired polycythemia    Hallux limitus of left foot    Hallux valgus    Hypercholesterolemia    Mild intermittent asthma    Morbid obesity (H)    Post-traumatic stress disorder    Psychosexual dysfunction with inhibited sexual excitement    Vertigo    Memory loss, short term    Injury of head, initial encounter    Fall at home, initial encounter        ______________________________________________________________________________    History of Present Illness    Hematologist: Dr. Lagunas    Diagnosis: Erythrocytosis.  Likely from MAUDE.  Also could have been from medications he was taking related to competitive weightlifting    Treatment:  Observation.    Interim history:  Patient is here today for follow-up visit.  He was last seen about 6 months ago.  Overall he states that he has not noticed any changes.  The only thing he is complaining about today is a couple weeks ago he fell in his garage hit his head on his car and came to the ER because he did knocked unconscious.  He states he does not know how long he was out but he was cold when he came to.  He was seen in the ER and they did not really do anything and said it was likely a bad concussion.  Patient continues to have some headaches.  Does not always sleep well.  He has a little bit of personality change like getting a little bit angry easier.  Other than that denies any vision issues.  Denies any neurological changes.  Denies any bleeding bruising.  Denies any bone or back pain.  States that he did lose a lot of weight but a lot of it was muscle because he has not really been able to do much of his weight lifting that he used to do but he is trying to slowly get back some muscle.    Review of Systems    Pertinent items are noted in HPI.    Past History    Past Medical History:  "  Diagnosis Date    Benign positional vertigo     Cellulitis and abscess of leg Feb. 2012    hospitalized at [facility] for treatment    Conductive hearing loss     Hearing problem     Hypertension     Hypogonadism, male     Obstructive sleep apnea     MAUDE (obstructive sleep apnea)     Problem, psychiatric     Recurrent otitis media     Tinnitus     Uncomplicated asthma        PHYSICAL EXAM  /85 (BP Location: Right arm, Patient Position: Sitting, Cuff Size: Adult Large)   Pulse 92   Temp 98.2  F (36.8  C) (Tympanic)   Resp 22   Ht 1.632 m (5' 4.25\")   Wt 91.2 kg (201 lb)   SpO2 95%   BMI 34.23 kg/m      GENERAL: no acute distress. Cooperative in conversation. Alone in clinic  RESP: Regular respiratory rate. No expiratory wheezes   NEURO: non focal. Alert and oriented x3.   PSYCH: within normal limits. No depression or anxiety.  SKIN: exposed skin is dry intact.     Lab Results    Recent Results (from the past week)   CBC with platelets   Result Value Ref Range    WBC Count 6.6 4.0 - 11.0 10e3/uL    RBC Count 5.17 4.40 - 5.90 10e6/uL    Hemoglobin 16.6 13.3 - 17.7 g/dL    Hematocrit 49.0 40.0 - 53.0 %    MCV 95 78 - 100 fL    MCH 32.1 26.5 - 33.0 pg    MCHC 33.9 31.5 - 36.5 g/dL    RDW 12.8 10.0 - 15.0 %    Platelet Count 185 150 - 450 10e3/uL       Imaging    No results found.      Signed by: EDSON Stanton CNP  "

## 2025-01-21 NOTE — PROGRESS NOTES
"Oncology Rooming Note    January 21, 2025 1:01 PM   Steven Joyner is a 79 year old male who presents for:    Chief Complaint   Patient presents with    Oncology Clinic Visit     6 month follow up with labs     Initial Vitals: /85 (BP Location: Right arm, Patient Position: Sitting, Cuff Size: Adult Large)   Pulse 92   Temp 98.2  F (36.8  C) (Tympanic)   Resp 22   Ht 1.632 m (5' 4.25\")   Wt 91.2 kg (201 lb)   SpO2 95%   BMI 34.23 kg/m   Estimated body mass index is 34.23 kg/m  as calculated from the following:    Height as of this encounter: 1.632 m (5' 4.25\").    Weight as of this encounter: 91.2 kg (201 lb). Body surface area is 2.03 meters squared.  No Pain (0) Comment: Data Unavailable   No LMP for male patient.  Allergies reviewed: Yes  Medications reviewed: Yes    Medications: Medication refills not needed today.  Pharmacy name entered into [employer]:    [id number] - [place], [place] - [address] [facility] - [place], [place] - [address]    Frailty Screening:   Is the patient here for a new oncology consult visit in cancer care? 2. No      Clinical concerns:      JEANINE ESCALONA CMA            "

## 2025-02-24 ENCOUNTER — OFFICE VISIT (OUTPATIENT)
Dept: AUDIOLOGY | Facility: CLINIC | Age: 80
End: 2025-02-24
Payer: COMMERCIAL

## 2025-02-24 DIAGNOSIS — H91.93 BILATERAL HEARING LOSS, UNSPECIFIED HEARING LOSS TYPE: ICD-10-CM

## 2025-02-24 NOTE — PROGRESS NOTES
AUDIOLOGY REPORT    SUBJECTIVE:  Steven Joyner is a 79 year old male who was seen in the Audiology Clinic at the [facility] for audiologic evaluation, referred by Kashmir Jackson MD. 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 Communication with patient was very difficult, even when a personal amplifier was used, and limited history information was obtained. He endorsed bilateral tinnitus which is not pulsatile, and bilateral aural fullness. He denied current vertigo, otalgia, otorrhea, recent illness.  .    OBJECTIVE:  Abuse Screening:  Did not understand questions; did not complete    Fall Risk Screen:  1. Have you fallen two or more times in the past year?  Fell once; documented in record /primary care aware  2. Have you fallen and had an injury in the past year? No    Otoscopic exam indicates ears are clear of cerumen, bilaterally.     Pure Tone Thresholds assessed using conventional audiometry with good  reliability from 250-8000 Hz bilaterally using insert earphones and circumaural headphones.     RIGHT:  borderline-normal sloping to severe sensorineural hearing loss    LEFT:    borderline-normal sloping to severe sensorineural hearing loss    Tympanogram:    RIGHT: restricted eardrum mobility/shallow tracing     LEFT:   restricted eardrum mobility/shallow tracing    Reflexes could not be assessed; seals could not be maintained in either ear    Speech Reception Threshold:    RIGHT: 60 dB HL    LEFT:   55 dB HL  Word Recognition Score:     RIGHT: 68% at 90 dB HL using NU-6 recorded word list.    LEFT:   76% at 90 dB HL using NU-6 recorded word list.      ASSESSMENT:     ICD-10-CM    1. Bilateral hearing loss, unspecified hearing loss type  H91.93 Adult Audiology  Referral          Today s results were discussed with the patient in detail. Appropriate communication strategies were discussed at length, as were reasonable expectations  regarding hearing at a distance, in noisy environments, or when attention is diverted elsewhere. Common tinnitus triggers and management strategies were briefly discussed.    Mr. Joyner is a potential binaural amplification candidate; however, he may have a more limited dynamic range (recruitment was evident during testing; a lower presentation level was used for word recognition testing). He was provided with two copies of today's audiogram and written information on the [facility] hearing aid program. He was encouraged to check with his health insurance carrier on amplification benefits, to determine what benefits exist and where to make appointments to utilize those benefits.     PLAN:  Mr. Joyner should return in 1-2 years for hearing evaluation to monitor his hearing loss and to keep any amplification programming current. Wear hearing protection consistently in noise to preserve residual hearing sensitivity and to minimize the effects of noise on tinnitus.  Mr. Joyner expressed verbal understanding of this information and plan. Please call this clinic with questions regarding these results or recommendations.        Arcelia Gannon., [facility]  [place] Licensed Audiologist [id number]

## 2025-04-12 ENCOUNTER — HEALTH MAINTENANCE LETTER (OUTPATIENT)
Age: 80
End: 2025-04-12

## 2025-04-21 ENCOUNTER — MYC MEDICAL ADVICE (OUTPATIENT)
Dept: FAMILY MEDICINE | Facility: CLINIC | Age: 80
End: 2025-04-21

## 2025-04-21 DIAGNOSIS — E11.42 TYPE 2 DIABETES MELLITUS WITH DIABETIC POLYNEUROPATHY, WITHOUT LONG-TERM CURRENT USE OF INSULIN (H): ICD-10-CM

## 2025-04-21 RX ORDER — HYDROCHLOROTHIAZIDE 12.5 MG/1
CAPSULE ORAL
Qty: 6 EACH | Refills: 3 | Status: SHIPPED | OUTPATIENT
Start: 2025-04-21

## 2025-05-05 ENCOUNTER — PATIENT OUTREACH (OUTPATIENT)
Dept: CARE COORDINATION | Facility: CLINIC | Age: 80
End: 2025-05-05
Payer: COMMERCIAL

## 2025-05-07 ENCOUNTER — PATIENT OUTREACH (OUTPATIENT)
Dept: CARE COORDINATION | Facility: CLINIC | Age: 80
End: 2025-05-07
Payer: COMMERCIAL

## 2025-05-08 ENCOUNTER — PATIENT OUTREACH (OUTPATIENT)
Dept: CARE COORDINATION | Facility: CLINIC | Age: 80
End: 2025-05-08
Payer: COMMERCIAL

## 2025-05-08 DIAGNOSIS — I10 ESSENTIAL HYPERTENSION: ICD-10-CM

## 2025-05-08 RX ORDER — LOSARTAN POTASSIUM 50 MG/1
50 TABLET ORAL DAILY
Qty: 90 TABLET | Refills: 2 | Status: SHIPPED | OUTPATIENT
Start: 2025-05-08

## 2025-05-24 ENCOUNTER — MYC REFILL (OUTPATIENT)
Dept: FAMILY MEDICINE | Facility: CLINIC | Age: 80
End: 2025-05-24
Payer: COMMERCIAL

## 2025-05-24 DIAGNOSIS — E11.69 TYPE 2 DIABETES MELLITUS WITH OTHER SPECIFIED COMPLICATION, WITHOUT LONG-TERM CURRENT USE OF INSULIN (H): ICD-10-CM

## 2025-06-10 DIAGNOSIS — I10 ESSENTIAL HYPERTENSION: ICD-10-CM

## 2025-06-10 RX ORDER — AMLODIPINE BESYLATE 5 MG/1
5 TABLET ORAL DAILY
Qty: 90 TABLET | Refills: 1 | Status: SHIPPED | OUTPATIENT
Start: 2025-06-10

## 2025-07-01 ENCOUNTER — OFFICE VISIT (OUTPATIENT)
Dept: FAMILY MEDICINE | Facility: CLINIC | Age: 80
End: 2025-07-01
Payer: COMMERCIAL

## 2025-07-01 VITALS
DIASTOLIC BLOOD PRESSURE: 76 MMHG | TEMPERATURE: 98 F | RESPIRATION RATE: 24 BRPM | WEIGHT: 190.4 LBS | SYSTOLIC BLOOD PRESSURE: 130 MMHG | OXYGEN SATURATION: 95 % | HEIGHT: 64 IN | HEART RATE: 95 BPM | BODY MASS INDEX: 32.5 KG/M2

## 2025-07-01 DIAGNOSIS — R41.89 COGNITIVE IMPAIRMENT: ICD-10-CM

## 2025-07-01 DIAGNOSIS — E11.42 TYPE 2 DIABETES MELLITUS WITH DIABETIC POLYNEUROPATHY, WITHOUT LONG-TERM CURRENT USE OF INSULIN (H): Primary | ICD-10-CM

## 2025-07-01 PROCEDURE — 3078F DIAST BP <80 MM HG: CPT | Performed by: FAMILY MEDICINE

## 2025-07-01 PROCEDURE — 1126F AMNT PAIN NOTED NONE PRSNT: CPT | Performed by: FAMILY MEDICINE

## 2025-07-01 PROCEDURE — 3075F SYST BP GE 130 - 139MM HG: CPT | Performed by: FAMILY MEDICINE

## 2025-07-01 PROCEDURE — 99213 OFFICE O/P EST LOW 20 MIN: CPT | Performed by: FAMILY MEDICINE

## 2025-07-01 RX ORDER — GLIMEPIRIDE 4 MG/1
4 TABLET ORAL
Qty: 180 TABLET | Refills: 3 | Status: SHIPPED | OUTPATIENT
Start: 2025-07-01

## 2025-07-01 RX ORDER — METFORMIN HYDROCHLORIDE 500 MG/1
1000 TABLET, EXTENDED RELEASE ORAL 2 TIMES DAILY WITH MEALS
Qty: 360 TABLET | Refills: 3 | Status: SHIPPED | OUTPATIENT
Start: 2025-07-01

## 2025-07-01 ASSESSMENT — PAIN SCALES - GENERAL: PAINLEVEL_OUTOF10: NO PAIN (0)

## 2025-07-01 NOTE — PROGRESS NOTES
"Steven Joyner  /76   Pulse 95   Temp 98  F (36.7  C)   Resp 24   Ht 1.632 m (5' 4.25\")   Wt 86.4 kg (190 lb 6.4 oz)   SpO2 95%   BMI 32.43 kg/m       Assessment/Plan:                Sarkis was seen today for recheck medication.    Diagnoses and all orders for this visit:    Type 2 diabetes mellitus with diabetic polyneuropathy, without long-term current use of insulin (H)  -     glimepiride (AMARYL) 4 MG tablet; Take 1 tablet (4 mg) by mouth 2 times daily (before meals).  -     metFORMIN (GLUCOPHAGE XR) 500 MG 24 hr tablet; Take 2 tablets (1,000 mg) by mouth 2 times daily (with meals).  -     insulin glargine (LANTUS PEN) 100 UNIT/ML pen; Inject 40 Units subcutaneously at bedtime.         DISCUSSION  Medication changes as noted above.  Patient is provided with the following written instructions.  Will see him again in 2 to 3 weeks for follow-up.    I recommend the following changes to your diabetes medications:    Stop taking Jardiance.  Do not discard the medication, we will likely return to taking the medication at some point in the future.  Taking this medication when your sugar is running very high can potentially cause severe side effects.    Increase Lantus insulin up to 40 units once daily.  (New prescription sent to pharmacy for updated amount)    Increase glimepiride up to 4 mg twice daily, once in the morning and once with the evening meal.  (New prescription sent to pharmacy for updated amount)    Increase metformin to 500 mg, 2 tablets twice daily, once with breakfast and once with evening meal.  (New prescription sent to pharmacy for updated amount)    We should arrange for a follow-up visit in 2 to 3 weeks to check blood sugars.  Notify me of problems or concerns.  Subjective:     HPI:    Steven Joyner is a 80 year old male is here today for follow-up at my request.  He brought his medications but did not bring his insulin.  He has a significant cognitive impairment likely has an evolving " dementia process that is unfortunately probably more on the severe side.  He does reside at home with his wife.  His wife manages his medications and has been present at other visits but is not here today.  She is sent medications to me outlining concerns regarding his cognitive dysfunction at home.  He also tends to drink highly sugared beverages frequently although as I talked to him today he is trying to cut back on doing this and his blood sugars seem to be showing some improvement over the past couple of days.  I have just last week and asked him to return today with his medications.  He was started on part of his medication regiment for managing diabetes when he was seeing an endocrinologist in a different health system who is actually managing hypogonadism.  Several medications have not been refilled consistently.  Today we reviewed all of the medications that he has with the exception of his insulin that he did not bring and does not recall having at home or ever using although I know definitively that he does use the with the help of his wife.  We went through and discussed changes I wrote them down and also sent a communication to his wife.  We will try to simplify the regimen by sticking with the long-acting insulin and oral medications metformin and glimepiride.  Due to concerns regarding hyperglycemia that does seem to be still common we will forego usage of the Jardiance for the time being to prevent any risk for diabetic ketoacidosis.  Discussed with patient that we will likely return to the medication at some point in time and documented this in the written instructions I gave to him and sent to his wife.  Will arrange for short-term follow-up to continue to track this and hopefully to have more time available and hopefully with the help of his wife decide on further course of action regarding his significant cognitive difficulties.    ROS:  Complete review of systems is obtained.  Other than the  specific considerations noted above complete review of systems is negative.          Objective:   Medications:  Current Outpatient Medications   Medication Sig Dispense Refill    glimepiride (AMARYL) 4 MG tablet Take 1 tablet (4 mg) by mouth 2 times daily (before meals). 180 tablet 3    insulin glargine (LANTUS PEN) 100 UNIT/ML pen Inject 40 Units subcutaneously at bedtime. 30 mL 3    metFORMIN (GLUCOPHAGE XR) 500 MG 24 hr tablet Take 2 tablets (1,000 mg) by mouth 2 times daily (with meals). 360 tablet 3    amLODIPine (NORVASC) 5 MG tablet TAKE 1 TABLET BY MOUTH EVERY DAY 90 tablet 1    aspirin 81 MG EC tablet Take 1 tablet (81 mg) by mouth daily. 90 tablet 3    blood glucose (NO BRAND SPECIFIED) test strip Use to test blood sugar 3 times daily or as directed. To accompany: Blood Glucose Monitor Brands: per insurance. 300 strip 6    blood glucose monitoring (NO BRAND SPECIFIED) meter device kit Use to test blood sugar 3 times daily or as directed. Preferred blood glucose meter OR supplies to accompany: Blood Glucose Monitor Brands: per insurance. 1 kit 0    Continuous Glucose Sensor (FREESTYLE GAGE 3 PLUS SENSOR) MISC Use 1 sensor every 15 days. Use to read blood sugars per 's instructions. 6 each 3    gemfibrozil (LOPID) 600 MG tablet Take 1 tablet (600 mg) by mouth daily. 90 tablet 1    glimepiride (AMARYL) 4 MG tablet Take 4 mg by mouth daily.      insulin pen needle (31G X 5 MM) 31G X 5 MM miscellaneous Use 1 pen needles daily or as directed. 100 each 3    losartan (COZAAR) 50 MG tablet TAKE 1 TABLET BY MOUTH EVERY DAY 90 tablet 2    rosuvastatin (CRESTOR) 10 MG tablet Take 1 tablet (10 mg) by mouth daily. 90 tablet 3    thin (NO BRAND SPECIFIED) lancets Use with lanceting device. To accompany: Blood Glucose Monitor Brands: per insurance. 300 each 6     No current facility-administered medications for this visit.        Allergies:   No Known Allergies     Social History     Socioeconomic History     Marital status:      Spouse name: Not on file    Number of children: Not on file    Years of education: Not on file    Highest education level: Not on file   Occupational History    Not on file   Tobacco Use    Smoking status: Former     Types: Cigars     Start date: 2000     Quit date: 2003     Years since quittin.8    Smokeless tobacco: Never   Vaping Use    Vaping status: Never Used   Substance and Sexual Activity    Alcohol use: Not Currently    Drug use: Never    Sexual activity: Not on file   Other Topics Concern    Parent/sibling w/ CABG, MI or angioplasty before 65F 55M? Not Asked   Social History Narrative    Not on file     Social Drivers of Health     Financial Resource Strain: Low Risk  (2024)    Financial Resource Strain     Within the past 12 months, have you or your family members you live with been unable to get utilities (heat, electricity) when it was really needed?: No   Food Insecurity: Low Risk  (2024)    Food Insecurity     Within the past 12 months, did you worry that your food would run out before you got money to buy more?: No     Within the past 12 months, did the food you bought just not last and you didn t have money to get more?: No   Transportation Needs: Low Risk  (2024)    Transportation Needs     Within the past 12 months, has lack of transportation kept you from medical appointments, getting your medicines, non-medical meetings or appointments, work, or from getting things that you need?: No   Physical Activity: Not on file   Stress: Not on file   Social Connections: Unknown (2021)    Received from [facility]    Social Connections     Frequency of Communication with Friends and Family: Not on file   Interpersonal Safety: Low Risk  (3/11/2024)    Interpersonal Safety     Do you feel physically and emotionally safe where you currently live?: Yes     Within the past 12 months, have you been hit, slapped, kicked  or otherwise physically hurt by someone?: No     Within the past 12 months, have you been humiliated or emotionally abused in other ways by your partner or ex-partner?: No   Housing Stability: Low Risk  (9/23/2024)    Housing Stability     Do you have housing? : Yes     Are you worried about losing your housing?: No       Family History   Problem Relation Age of Onset    Psychotic Disorder Sister         vulnerable adult    Cerebrovascular Disease Mother         Most Recent Immunizations   Administered Date(s) Administered    COVID-19 12+ (Pfizer) 10/14/2024    COVID-19 MONOVALENT 12+ (Pfizer) 11/16/2021    COVID-19 Monovalent 12+ (Pfizer 2022) 04/15/2022    Flu, Unspecified 10/22/2014    Influenza (High Dose) Trivalent,PF (Fluzone) 10/14/2024    Influenza (IIV3) PF 10/22/2014    Influenza Vaccine 65+ (FLUAD) 11/22/2021    Influenza Vaccine 65+ (Fluzone HD) 12/20/2023    Pneumo Conj 13-V (2010&after) 10/22/2014    Pneumococcal 23 valent 07/23/2012    RSV (Abrysvo) 10/14/2024    TD,PF 7+ (Tenivac) 05/15/2002    TDAP (Adacel,Boostrix) 08/03/2018    Td (Adult), Adsorbed 07/26/2005    Zoster vaccine, live 07/03/2012        Wt Readings from Last 3 Encounters:   07/01/25 86.4 kg (190 lb 6.4 oz)   06/27/25 87.5 kg (192 lb 14.4 oz)   05/23/25 87.3 kg (192 lb 6.4 oz)        BP Readings from Last 6 Encounters:   07/01/25 130/76   06/27/25 126/72   05/23/25 130/72   04/18/25 134/76   04/11/25 126/72   01/21/25 139/85        Hemoglobin A1C   Date Value Ref Range Status   06/27/2025 10.7 (H) 0.0 - 5.6 % Final     Comment:     Normal <5.7%   Prediabetes 5.7-6.4%    Diabetes 6.5% or higher     Note: Adopted from ADA consensus guidelines.   04/11/2025 13.6 (H) 0.0 - 5.6 % Final     Comment:     Normal <5.7%   Prediabetes 5.7-6.4%    Diabetes 6.5% or higher     Note: Adopted from ADA consensus guidelines.   09/23/2024 10.7 (H) <5.7 % Final     Comment:     Normal <5.7%   Prediabetes 5.7-6.4%    Diabetes 6.5% or higher     Note:  "Adopted from ADA consensus guidelines.              PHYSICAL EXAM:    /76   Pulse 95   Temp 98  F (36.7  C)   Resp 24   Ht 1.632 m (5' 4.25\")   Wt 86.4 kg (190 lb 6.4 oz)   SpO2 95%   BMI 32.43 kg/m           General Appearance:    Alert, cooperative, no distress   Extremities:  No edema, no joint swelling or redness, no evidence of any injuries   Skin:  No concerning skin findings, no suspicious moles, no rashes   Neurologic:  On gross examination there is no motor or sensory deficit.  Patient walks with a normal gait                                     Answers submitted by the patient for this visit:  Diabetes Visit (Submitted on 6/27/2025)  Chief Complaint: Chronic problems general questions HPI Form  Frequency of checking blood sugars:: continous glucose monitor  What time of day are you checking your blood sugars : before meals  Have you had any blood sugars above 200?: Yes  Have you had any blood sugars below 70?: No  Hypoglycemia symptoms:: none  Diabetic concerns:: blood sugar frequently over 200  Paraesthesia present:: excessive thirst  Have you had a diabetic eye exam within the last year?: No  General Questionnaire (Submitted on 6/27/2025)  Chief Complaint: Chronic problems general questions HPI Form  How many servings of fruits and vegetables do you eat daily?: 2-3  On average, how many sweetened beverages do you drink each day (Examples: soda, juice, sweet tea, etc.  Do NOT count diet or artificially sweetened beverages)?: 3  How many minutes a day do you exercise enough to make your heart beat faster?: 9 or less  How many days a week do you exercise enough to make your heart beat faster?: 3 or less  How many days per week do you miss taking your medication?: 0  Questionnaire about: Chronic problems general questions HPI Form (Submitted on 6/27/2025)  Chief Complaint: Chronic problems general questions HPI Form    "

## 2025-07-01 NOTE — PATIENT INSTRUCTIONS
I recommend the following changes to your diabetes medications:    Stop taking Jardiance.  Do not discard the medication, we will likely return to taking the medication at some point in the future.  Taking this medication when your sugar is running very high can potentially cause severe side effects.    Increase Lantus insulin up to 40 units once daily.  (New prescription sent to pharmacy for updated amount)    Increase glimepiride up to 4 mg twice daily, once in the morning and once with the evening meal.  (New prescription sent to pharmacy for updated amount)    Increase metformin to 500 mg, 2 tablets twice daily, once with breakfast and once with evening meal.  (New prescription sent to pharmacy for updated amount)    We should arrange for a follow-up visit in 2 to 3 weeks to check blood sugars.  Notify me of problems or concerns.

## 2025-07-22 ENCOUNTER — OFFICE VISIT (OUTPATIENT)
Dept: FAMILY MEDICINE | Facility: CLINIC | Age: 80
End: 2025-07-22
Payer: COMMERCIAL

## 2025-07-22 VITALS
HEART RATE: 85 BPM | DIASTOLIC BLOOD PRESSURE: 78 MMHG | RESPIRATION RATE: 24 BRPM | WEIGHT: 191.6 LBS | BODY MASS INDEX: 32.71 KG/M2 | SYSTOLIC BLOOD PRESSURE: 138 MMHG | TEMPERATURE: 98 F | HEIGHT: 64 IN | OXYGEN SATURATION: 96 %

## 2025-07-22 DIAGNOSIS — R41.89 COGNITIVE IMPAIRMENT: Primary | ICD-10-CM

## 2025-07-22 DIAGNOSIS — E11.42 TYPE 2 DIABETES MELLITUS WITH DIABETIC POLYNEUROPATHY, WITHOUT LONG-TERM CURRENT USE OF INSULIN (H): ICD-10-CM

## 2025-07-22 PROCEDURE — 3078F DIAST BP <80 MM HG: CPT | Performed by: FAMILY MEDICINE

## 2025-07-22 PROCEDURE — 99214 OFFICE O/P EST MOD 30 MIN: CPT | Performed by: FAMILY MEDICINE

## 2025-07-22 PROCEDURE — 3075F SYST BP GE 130 - 139MM HG: CPT | Performed by: FAMILY MEDICINE

## 2025-07-22 RX ORDER — EMPAGLIFLOZIN 25 MG/1
25 TABLET, FILM COATED ORAL DAILY
COMMUNITY
Start: 2025-07-14 | End: 2025-07-22

## 2025-07-22 NOTE — PATIENT INSTRUCTIONS
Change how you take the Lantus insulin as follows: Take 30 units in the morning and 30 units in the evening at bedtime.  (This is a change taking 40 units once daily)    Continue other medications as prior.    I recommend you have your wife help you manage all of your medications because of your memory difficulty.    I recommend that you call to schedule a visit with [facility] to evaluate the memory difficulty, I believe you have dementia that affects your memory and thinking and I need you to see them to help make the diagnosis and help us decide what more we should do.    The number for [facility] is [phone]

## 2025-07-22 NOTE — PROGRESS NOTES
"Steven Joyner  /80   Pulse 85   Temp 98  F (36.7  C)   Resp 24   Ht 1.632 m (5' 4.25\")   Wt 86.9 kg (191 lb 9.6 oz)   SpO2 96%   BMI 32.63 kg/m       Assessment/Plan:                Sarkis was seen today for recheck medication and diabetes.    Diagnoses and all orders for this visit:    Cognitive impairment  -     Adult Neurology  Referral; Future    Type 2 diabetes mellitus with diabetic polyneuropathy, without long-term current use of insulin (H)  -     insulin glargine (LANTUS PEN) 100 UNIT/ML pen; Inject 30 Units subcutaneously 2 times daily.         DISCUSSION  See discussion below.  Subjective:     HPI:    Steven Joyner is a 80 year old male is here today for follow-up on diabetes.  He brings his CGM with him.  Blood sugars have improved only slightly.  Still more confusion about what medications he is taking and at which doses.  He gives me some contradictory information as we discussed.  He is able to confirm for me today he does take insulin.  Today I provided with written instructions of increasing his insulin dose.  We talked again about cutting out sugared beverages from his diet he gives me some conflicting information about his consumption of sugared beverages currently.  Based on additional information received from his wife I think it is likely that this is still a concern.  Did not make any other medication changes other than increasing Lantus insulin and splitting it to twice daily.  Will continue to arrange for short-term follow-up.    I discussed with him frankly today I believe he has dementia.  I discussed with him what I believed to be the ramifications of that.  He seems to be agreeable to allow his wife to help manage his medications.  He has been resistant to this previously.  I also discussed with him I would like him to see a specialist to help solidify diagnosis.  We need to continue to monitor the situation closely as he is clearly having more more dysfunction from " his cognitive decline, what ever causes.    ROS:  Complete review of systems is obtained.  Other than the specific considerations noted above complete review of systems is negative.          Objective:   Medications:  Current Outpatient Medications   Medication Sig Dispense Refill    amLODIPine (NORVASC) 5 MG tablet TAKE 1 TABLET BY MOUTH EVERY DAY 90 tablet 1    aspirin 81 MG EC tablet Take 1 tablet (81 mg) by mouth daily. 90 tablet 3    blood glucose (NO BRAND SPECIFIED) test strip Use to test blood sugar 3 times daily or as directed. To accompany: Blood Glucose Monitor Brands: per insurance. 300 strip 6    blood glucose monitoring (NO BRAND SPECIFIED) meter device kit Use to test blood sugar 3 times daily or as directed. Preferred blood glucose meter OR supplies to accompany: Blood Glucose Monitor Brands: per insurance. 1 kit 0    Continuous Glucose Sensor (FREESTYLE GAGE 3 PLUS SENSOR) MISC Use 1 sensor every 15 days. Use to read blood sugars per 's instructions. 6 each 3    gemfibrozil (LOPID) 600 MG tablet Take 1 tablet (600 mg) by mouth daily. 90 tablet 1    glimepiride (AMARYL) 4 MG tablet Take 1 tablet (4 mg) by mouth 2 times daily (before meals). 180 tablet 3    insulin glargine (LANTUS PEN) 100 UNIT/ML pen Inject 30 Units subcutaneously 2 times daily. 30 mL 3    insulin pen needle (31G X 5 MM) 31G X 5 MM miscellaneous Use 1 pen needles daily or as directed. 100 each 3    losartan (COZAAR) 50 MG tablet TAKE 1 TABLET BY MOUTH EVERY DAY 90 tablet 2    metFORMIN (GLUCOPHAGE XR) 500 MG 24 hr tablet Take 2 tablets (1,000 mg) by mouth 2 times daily (with meals). 360 tablet 3    rosuvastatin (CRESTOR) 10 MG tablet Take 1 tablet (10 mg) by mouth daily. 90 tablet 3    thin (NO BRAND SPECIFIED) lancets Use with lanceting device. To accompany: Blood Glucose Monitor Brands: per insurance. 300 each 6     No current facility-administered medications for this visit.        Allergies:   No Known Allergies      Social History     Socioeconomic History    Marital status:      Spouse name: Not on file    Number of children: Not on file    Years of education: Not on file    Highest education level: Not on file   Occupational History    Not on file   Tobacco Use    Smoking status: Former     Types: Cigars     Start date: 2000     Quit date: 2003     Years since quittin.8    Smokeless tobacco: Never   Vaping Use    Vaping status: Never Used   Substance and Sexual Activity    Alcohol use: Not Currently    Drug use: Never    Sexual activity: Not on file   Other Topics Concern    Parent/sibling w/ CABG, MI or angioplasty before 65F 55M? Not Asked   Social History Narrative    Not on file     Social Drivers of Health     Financial Resource Strain: Low Risk  (2024)    Financial Resource Strain     Within the past 12 months, have you or your family members you live with been unable to get utilities (heat, electricity) when it was really needed?: No   Food Insecurity: Low Risk  (2024)    Food Insecurity     Within the past 12 months, did you worry that your food would run out before you got money to buy more?: No     Within the past 12 months, did the food you bought just not last and you didn t have money to get more?: No   Transportation Needs: Low Risk  (2024)    Transportation Needs     Within the past 12 months, has lack of transportation kept you from medical appointments, getting your medicines, non-medical meetings or appointments, work, or from getting things that you need?: No   Physical Activity: Not on file   Stress: Not on file   Social Connections: Unknown (2021)    [facility]    Social Connections     Frequency of Communication with Friends and Family: Not on file   Interpersonal Safety: Low Risk  (3/11/2024)    Interpersonal Safety     Do you feel physically and emotionally safe where you currently live?: Yes     Within the  past 12 months, have you been hit, slapped, kicked or otherwise physically hurt by someone?: No     Within the past 12 months, have you been humiliated or emotionally abused in other ways by your partner or ex-partner?: No   Housing Stability: Low Risk  (9/23/2024)    Housing Stability     Do you have housing? : Yes     Are you worried about losing your housing?: No       Family History   Problem Relation Age of Onset    Psychotic Disorder Sister         vulnerable adult    Cerebrovascular Disease Mother         Most Recent Immunizations   Administered Date(s) Administered    COVID-19 12+ (Pfizer) 10/14/2024    COVID-19 MONOVALENT 12+ (Pfizer) 11/16/2021    COVID-19 Monovalent 12+ (Pfizer 2022) 04/15/2022    Flu, Unspecified 10/22/2014    Influenza (High Dose) Trivalent,PF (Fluzone) 10/14/2024    Influenza (IIV3) PF 10/22/2014    Influenza Vaccine 65+ (FLUAD) 11/22/2021    Influenza Vaccine 65+ (Fluzone HD) 12/20/2023    Pneumo Conj 13-V (2010&after) 10/22/2014    Pneumococcal 23 valent 07/23/2012    RSV (Abrysvo) 10/14/2024    TD,PF 7+ (Tenivac) 05/15/2002    TDAP (Adacel,Boostrix) 08/03/2018    Td (Adult), Adsorbed 07/26/2005    Zoster vaccine, live 07/03/2012        Wt Readings from Last 3 Encounters:   07/22/25 86.9 kg (191 lb 9.6 oz)   07/01/25 86.4 kg (190 lb 6.4 oz)   06/27/25 87.5 kg (192 lb 14.4 oz)        BP Readings from Last 6 Encounters:   07/22/25 138/78   07/01/25 130/76   06/27/25 126/72   05/23/25 130/72   04/18/25 134/76   04/11/25 126/72        Hemoglobin A1C   Date Value Ref Range Status   06/27/2025 10.7 (H) 0.0 - 5.6 % Final     Comment:     Normal <5.7%   Prediabetes 5.7-6.4%    Diabetes 6.5% or higher     Note: Adopted from ADA consensus guidelines.   04/11/2025 13.6 (H) 0.0 - 5.6 % Final     Comment:     Normal <5.7%   Prediabetes 5.7-6.4%    Diabetes 6.5% or higher     Note: Adopted from ADA consensus guidelines.   09/23/2024 10.7 (H) <5.7 % Final     Comment:     Normal <5.7%   Prediabetes  "5.7-6.4%    Diabetes 6.5% or higher     Note: Adopted from ADA consensus guidelines.              PHYSICAL EXAM:    /78   Pulse 85   Temp 98  F (36.7  C)   Resp 24   Ht 1.632 m (5' 4.25\")   Wt 86.9 kg (191 lb 9.6 oz)   SpO2 96%   BMI 32.63 kg/m       General: Patient with no signs of distress.  Gives conflicting information today.  Clear that he is not very certain about his medications has obvious short-term memory deficits.                          Answers submitted by the patient for this visit:  Diabetes Visit (Submitted on 7/19/2025)  Chief Complaint: Chronic problems general questions HPI Form  Frequency of checking blood sugars:: three times daily  What time of day are you checking your blood sugars : before and after meals  Have you had any blood sugars above 200?: Yes  Have you had any blood sugars below 70?: No  Hypoglycemia symptoms:: none  Diabetic concerns:: blood sugar frequently over 200  Paraesthesia present:: excessive thirst  Have you had a diabetic eye exam within the last year?: No  General Questionnaire (Submitted on 7/19/2025)  Chief Complaint: Chronic problems general questions HPI Form  How many servings of fruits and vegetables do you eat daily?: 2-3  On average, how many sweetened beverages do you drink each day (Examples: soda, juice, sweet tea, etc.  Do NOT count diet or artificially sweetened beverages)?: 2  How many minutes a day do you exercise enough to make your heart beat faster?: 9 or less  How many days a week do you exercise enough to make your heart beat faster?: 3 or less  How many days per week do you miss taking your medication?: 0  Questionnaire about: Chronic problems general questions HPI Form (Submitted on 7/19/2025)  Chief Complaint: Chronic problems general questions HPI Form    "

## 2025-07-23 ENCOUNTER — PATIENT OUTREACH (OUTPATIENT)
Dept: CARE COORDINATION | Facility: CLINIC | Age: 80
End: 2025-07-23
Payer: COMMERCIAL

## 2025-08-23 DIAGNOSIS — E78.00 HYPERCHOLESTEROLEMIA: ICD-10-CM

## 2025-08-25 RX ORDER — GEMFIBROZIL 600 MG/1
600 TABLET, FILM COATED ORAL DAILY
Qty: 90 TABLET | Refills: 1 | Status: SHIPPED | OUTPATIENT
Start: 2025-08-25

## 2025-09-04 ENCOUNTER — TELEPHONE (OUTPATIENT)
Dept: PHARMACY | Facility: OTHER | Age: 80
End: 2025-09-04
Payer: COMMERCIAL